# Patient Record
Sex: FEMALE | Race: WHITE | Employment: FULL TIME | URBAN - METROPOLITAN AREA
[De-identification: names, ages, dates, MRNs, and addresses within clinical notes are randomized per-mention and may not be internally consistent; named-entity substitution may affect disease eponyms.]

---

## 2017-07-06 ENCOUNTER — OP HISTORICAL/CONVERTED ENCOUNTER (OUTPATIENT)
Dept: OTHER | Age: 22
End: 2017-07-06

## 2017-07-07 ENCOUNTER — OP HISTORICAL/CONVERTED ENCOUNTER (OUTPATIENT)
Dept: OTHER | Age: 22
End: 2017-07-07

## 2018-02-15 ENCOUNTER — OFFICE VISIT (OUTPATIENT)
Dept: OBGYN CLINIC | Age: 23
End: 2018-02-15

## 2018-02-15 ENCOUNTER — OFFICE VISIT (OUTPATIENT)
Dept: SURGERY | Age: 23
End: 2018-02-15

## 2018-02-15 VITALS
HEIGHT: 64 IN | DIASTOLIC BLOOD PRESSURE: 75 MMHG | WEIGHT: 127 LBS | SYSTOLIC BLOOD PRESSURE: 113 MMHG | BODY MASS INDEX: 21.68 KG/M2 | HEART RATE: 83 BPM

## 2018-02-15 VITALS — BODY MASS INDEX: 22.35 KG/M2 | DIASTOLIC BLOOD PRESSURE: 70 MMHG | WEIGHT: 128.2 LBS | SYSTOLIC BLOOD PRESSURE: 100 MMHG

## 2018-02-15 DIAGNOSIS — N63.10 BREAST MASS, RIGHT: ICD-10-CM

## 2018-02-15 DIAGNOSIS — N64.4 BREAST PAIN, RIGHT: Primary | ICD-10-CM

## 2018-02-15 DIAGNOSIS — N64.4 MASTODYNIA OF RIGHT BREAST: Primary | ICD-10-CM

## 2018-02-15 NOTE — MR AVS SNAPSHOT
[address] [facility] 
 
 
 [facility] 
[phone] Patient: Monica Renner MRN: [id number] :1995 Visit Information Date & Time Provider Department Dept. Phone Encounter #  
 2/15/2018  1:00 PM Catherine Sainz MD [facility] [phone] [id number] Upcoming Health Maintenance Date Due Hepatitis A Peds Age 1-18 (1 of 2 - Standard Series) 1998 DTaP/Tdap/Td series (1 - Tdap) 2004 HPV AGE 9Y-26Y (1 of 3 - Female 3 Dose Series) 2008 Influenza Age 5 to Adult 2017 Allergies as of 2/15/2018  Review Complete On: 2/15/2018 By: Catherine Sainz MD  
  
 Severity Noted Reaction Type Reactions Latex  02/15/2018    Hives Iodine  2016    Hives, Nausea and Vomiting Pcn [Penicillins]  2016    Hives Current Immunizations  Never Reviewed No immunizations on file. Not reviewed this visit Vitals BP Pulse Height(growth percentile) Weight(growth percentile) LMP BMI  
 113/75 83 5' 3.5\" (1.613 m) 127 lb (57.6 kg) 2018 (Exact Date) 22.14 kg/m2 OB Status Smoking Status Having regular periods Never Smoker BMI and BSA Data Body Mass Index Body Surface Area  
 22.14 kg/m 2 1.61 m 2 Preferred Pharmacy Pharmacy Name Phone [id number] [phone] Your Updated Medication List  
  
   
This list is accurate as of: 2/15/18  4:05 PM.  Always use your most recent med list.  
  
  
  
  
 multivitamins Chew Take  by mouth. Patient Instructions Breast Pain: Care Instructions Your Care Instructions Breast tenderness and pain may come and go with your monthly periods (cyclic), or it may not follow any pattern (noncyclic).  Breast pain is rarely caused by a serious health problem. You may need tests to find the cause. Follow-up care is a key part of your treatment and safety. Be sure to make and go to all appointments, and call your doctor if you are having problems. It's also a good idea to know your test results and keep a list of the medicines you take. How can you care for yourself at home? · If your doctor gave you medicine, take it exactly as prescribed. Call your doctor if you think you are having a problem with your medicine. · Take an over-the-counter pain medicine, such as acetaminophen (Tylenol), ibuprofen (Advil, Motrin), or naproxen (Aleve), to relieve pain and swelling. Read and follow all instructions on the label. · Do not take two or more pain medicines at the same time unless the doctor told you to. Many pain medicines have acetaminophen, which is Tylenol. Too much acetaminophen (Tylenol) can be harmful. · Wear a supportive bra, such as a sports bra or a jog bra. · Cut down on the amount of fat in your diet. If you need help planning healthy meals, see a dietitian. · Get at least 30 minutes of exercise on most days of the week. Walking is a good choice. You also may want to do other activities, such as running, swimming, cycling, or playing tennis or team sports. · Keep a healthy sleep pattern. Go to bed at the same time every night, and get up at the same time every day. When should you call for help? Call your doctor now or seek immediate medical care if: 
? · You have new changes in a breast, such as: ¨ A lump or thickening in your breast or armpit. ¨ A change in the breast's size or shape. ¨ Skin changes, such as dimples or puckers. ¨ Nipple discharge. ¨ A change in the color or feel of the skin of your breast or the darker area around the nipple (areola). ¨ A change in the shape of the nipple (it may look like it's being pulled into the breast). ? · You have symptoms of a breast infection, such as: ¨ Increased pain, swelling, redness, or warmth around a breast. 
¨ Red streaks extending from the breast. 
¨ Pus draining from a breast. 
¨ A fever. ? Watch closely for changes in your health, and be sure to contact your doctor if: 
? · Your breast pain does not get better after 1 week. ? · You have a lump or thickening in your breast or armpit. Where can you learn more? Go to http://debora-leslie.info/. Enter L772 in the search box to learn more about \"Breast Pain: Care Instructions. \" Current as of: March 20, 2017 Content Version: 11.4 © 5660-6215 [employer]. Care instructions adapted under license by Gather App (which disclaims liability or warranty for this information). If you have questions about a medical condition or this instruction, always ask your healthcare professional. [address] any warranty or liability for your use of this information. Introducing [address]! Dear Susu Palomino: 
Thank you for requesting a Data Marketplace account. Our records indicate that you already have an active Data Marketplace account. You can access your account anytime at [email or web address] Did you know that you can access your hospital and ER discharge instructions at any time in Data Marketplace? You can also review all of your test results from your hospital stay or ER visit. Additional Information If you have questions, please visit the Frequently Asked Questions section of the Data Marketplace website at [email or web address] Remember, Data Marketplace is NOT to be used for urgent needs. For medical emergencies, dial 911. Now available from your iPhone and Android! Please provide this summary of care documentation to your next provider. Your primary care clinician is listed as Daniella Castrejon. If you have any questions after today's visit, please call [phone].

## 2018-02-15 NOTE — PATIENT INSTRUCTIONS
Breast Pain: Care Instructions  Your Care Instructions    Breast tenderness and pain may come and go with your monthly periods (cyclic), or it may not follow any pattern (noncyclic). Breast pain is rarely caused by a serious health problem. You may need tests to find the cause. Follow-up care is a key part of your treatment and safety. Be sure to make and go to all appointments, and call your doctor if you are having problems. It's also a good idea to know your test results and keep a list of the medicines you take. How can you care for yourself at home? · If your doctor gave you medicine, take it exactly as prescribed. Call your doctor if you think you are having a problem with your medicine. · Take an over-the-counter pain medicine, such as acetaminophen (Tylenol), ibuprofen (Advil, Motrin), or naproxen (Aleve), to relieve pain and swelling. Read and follow all instructions on the label. · Do not take two or more pain medicines at the same time unless the doctor told you to. Many pain medicines have acetaminophen, which is Tylenol. Too much acetaminophen (Tylenol) can be harmful. · Wear a supportive bra, such as a sports bra or a jog bra. · Cut down on the amount of fat in your diet. If you need help planning healthy meals, see a dietitian. · Get at least 30 minutes of exercise on most days of the week. Walking is a good choice. You also may want to do other activities, such as running, swimming, cycling, or playing tennis or team sports. · Keep a healthy sleep pattern. Go to bed at the same time every night, and get up at the same time every day. When should you call for help? Call your doctor now or seek immediate medical care if:  ? · You have new changes in a breast, such as:  ¨ A lump or thickening in your breast or armpit. ¨ A change in the breast's size or shape. ¨ Skin changes, such as dimples or puckers. ¨ Nipple discharge.   ¨ A change in the color or feel of the skin of your breast or the darker area around the nipple (areola). ¨ A change in the shape of the nipple (it may look like it's being pulled into the breast). ? · You have symptoms of a breast infection, such as:  ¨ Increased pain, swelling, redness, or warmth around a breast.  ¨ Red streaks extending from the breast.  ¨ Pus draining from a breast.  ¨ A fever. ? Watch closely for changes in your health, and be sure to contact your doctor if:  ? · Your breast pain does not get better after 1 week. ? · You have a lump or thickening in your breast or armpit. Where can you learn more? Go to http://debora-leslie.info/. Enter J752 in the search box to learn more about \"Breast Pain: Care Instructions. \"  Current as of: March 20, 2017  Content Version: 11.4  © 7118-5722 [employer]. Care instructions adapted under license by monEchelle (which disclaims liability or warranty for this information). If you have questions about a medical condition or this instruction, always ask your healthcare professional. [address] any warranty or liability for your use of this information.

## 2018-02-15 NOTE — MR AVS SNAPSHOT
[address] [facility] [address] [facility] 
[phone] Patient: Dunia Bettencourt MRN: [id number] :1995 Visit Information Date & Time Provider Department Dept. Phone Encounter #  
 2/15/2018 11:00 AM Jazmin Allen MD [facility] [phone] [id number] Upcoming Health Maintenance Date Due  
 HPV AGE 9Y-34Y (1 of 3 - Female 3 Dose Series) 2008 Influenza Age 5 to Adult 2017 Allergies as of 2/15/2018  Review Complete On: 2016 By: Yohannes Booth MD  
  
 Severity Noted Reaction Type Reactions Latex  02/15/2018    Hives Iodine  2016    Hives, Nausea and Vomiting Pcn [Penicillins]  2016    Hives Current Immunizations  Never Reviewed No immunizations on file. Not reviewed this visit Vitals BP Weight(growth percentile) LMP BMI OB Status Smoking Status 100/70 128 lb 3.2 oz (58.2 kg) 2018 (Exact Date) 22.35 kg/m2 Having regular periods Never Smoker BMI and BSA Data Body Mass Index Body Surface Area  
 22.35 kg/m 2 1.61 m 2 Preferred Pharmacy Pharmacy Name Phone [id number], [address] [place] [address] [phone] Your Updated Medication List  
  
   
This list is accurate as of: 2/15/18 11:22 AM.  Always use your most recent med list.  
  
  
  
  
 multivitamins Chew Take  by mouth. Patient Instructions Breast Self-Exam: Care Instructions Your Care Instructions A breast self-exam is when you check your breasts for lumps or changes. This regular exam helps you learn how your breasts normally look and feel. Most breast problems or changes are not because of cancer. Breast self-exam is not a substitute for a mammogram. Having regular breast exams by your doctor and regular mammograms improve your chances of finding any problems with your breasts. Some women set a time each month to do a step-by-step breast self-exam. Other women like a less formal system. They might look at their breasts as they brush their teeth, or feel their breasts once in a while in the shower. If you notice a change in your breast, tell your doctor. Follow-up care is a key part of your treatment and safety. Be sure to make and go to all appointments, and call your doctor if you are having problems. It's also a good idea to know your test results and keep a list of the medicines you take. How do you do a breast self-exam? 
· The best time to examine your breasts is usually one week after your menstrual period begins. Your breasts should not be tender then. If you do not have periods, you might do your exam on a day of the month that is easy to remember. · To examine your breasts: ¨ Remove all your clothes above the waist and lie down. When you are lying down, your breast tissue spreads evenly over your chest wall, which makes it easier to feel all your breast tissue. ¨ Use the pads-not the fingertips-of the 3 middle fingers of your left hand to check your right breast. Move your fingers slowly in small coin-sized circles that overlap. ¨ Use three levels of pressure to feel of all your breast tissue. Use light pressure to feel the tissue close to the skin surface. Use medium pressure to feel a little deeper. Use firm pressure to feel your tissue close to your breastbone and ribs. Use each pressure level to feel your breast tissue before moving on to the next spot. ¨ Check your entire breast, moving up and down as if following a strip from the collarbone to the bra line, and from the armpit to the ribs. Repeat until you have covered the entire breast. 
¨ Repeat this procedure for your left breast, using the pads of the 3 middle fingers of your right hand. · To examine your breasts while in the shower: 
¨ Place one arm over your head and lightly soap your breast on that side. ¨ Using the pads of your fingers, gently move your hand over your breast (in the strip pattern described above), feeling carefully for any lumps or changes. ¨ Repeat for the other breast. 
· Have your doctor inspect anything you notice to see if you need further testing. Where can you learn more? Go to http://debora-leslie.info/. Enter P148 in the search box to learn more about \"Breast Self-Exam: Care Instructions. \" Current as of: May 12, 2017 Content Version: 11.4 © 2507-6728 [employer]. Care instructions adapted under license by Seemage (which disclaims liability or warranty for this information). If you have questions about a medical condition or this instruction, always ask your healthcare professional. [address] any warranty or liability for your use of this information. Introducing [facility]! Dear Diane Obrien: 
Thank you for requesting a LOC&ALL account. Our records indicate that you already have an active LOC&ALL account. You can access your account anytime at [email or web address] Did you know that you can access your hospital and ER discharge instructions at any time in LOC&ALL? You can also review all of your test results from your hospital stay or ER visit. Additional Information If you have questions, please visit the Frequently Asked Questions section of the LOC&ALL website at [email or web address] Remember, LOC&ALL is NOT to be used for urgent needs. For medical emergencies, dial 911. Now available from your iPhone and Android! Please provide this summary of care documentation to your next provider. Your primary care clinician is listed as NONE. If you have any questions after today's visit, please call [phone].

## 2018-02-15 NOTE — PROGRESS NOTES
[address] [facility]  [email or web address]    Ceasar Lawson MD, FACOG       OB/GYN Problem visit    Chief Complaint:   Chief Complaint   Patient presents with    Breast Problem       History of Present Illness: This is a new problem being evaluated by this provider. The patient is a 23 y.o. No obstetric history on file. female who reports having breast pain for 3 weeks. Pain started between menstrual cycles. Denies discomfort related to activity, denies leaking fluid, lumps, bumps. Pain extends from right axilla to bottom middle of breast.   She reports the symptoms are has worsened slightly. Aggravating factors include bending over. Alleviating factors include none. Right sided pain only. No trauma, no increase in chest exercises. Never had before. Little caffeine on a daily based. Works at [employer]. She reports no prior history of breast cancer, biopsy or abnormal mammograms. She is not breast feeding. She does not have other concerns. Last Mammogram Results:  No results found for this or any previous visit. LMP: Patient's last menstrual period was 02/12/2018 (exact date). PFSH:  Past Medical History:   Diagnosis Date    Premature birth     at 35 weeks    Scoliosis      History reviewed. No pertinent surgical history.   Family History   Problem Relation Age of Onset    No Known Problems Mother     No Known Problems Father     Thyroid Disease Sister      hypothyroidism    Lupus Sister     Other Sister      common variable immune deficiency    Neuropathy Sister     Other Maternal Grandfather      CFH    Breast Cancer Paternal Grandmother     Breast Cancer Other      Social History   Substance Use Topics    Smoking status: Never Smoker    Smokeless tobacco: Never Used    Alcohol use No     Allergies   Allergen Reactions    Latex Hives    Iodine Hives and Nausea and Vomiting    Pcn [Penicillins] Hives     Current Outpatient Prescriptions Medication Sig    multivitamins chew Take  by mouth. No current facility-administered medications for this visit. Review of Systems:  History obtained from the patient  Constitutional: negative for fevers, chills and weight loss  ENT ROS: negative for - hearing change, oral lesions or visual changes  Respiratory: negative for cough, wheezing or dyspnea on exertion  Cardiovascular: negative for chest pain, irregular heart beats, exertional chest pressure/discomfort  Gastrointestinal: negative for dysphagia, nausea and vomiting  Genito-Urinary ROS: no dysuria, trouble voiding, or hematuria  Inteument/breast: see HPI  Musculoskeletal:negative for stiff joints, neck pain and muscle weakness  Endocrine ROS: negative for - breast changes, galactorrhea or temperature intolerance  Hematological and Lymphatic ROS: negative for - blood clots, bruising or swollen lymph nodes    Physical Exam:  Visit Vitals    /70    Wt 128 lb 3.2 oz (58.2 kg)    BMI 22.35 kg/m2       GENERAL: alert, well appearing, and in no distress  HEAD: normocephalic, atraumatic. NECK:  supple, no significant adenopathy, no palpable masses  PULM: clear to auscultation, no wheezes, rales or rhonchi, symmetric air entry   COR: normal rate and regular rhythm, S1 and S2 normal   BACK: no cvat  ABDOMEN: soft, nontender, nondistended, no masses or organomegaly   BREAST:   Right breast appear normal, no suspicious masses: suspect cystic changes see image, no skin or nipple changes or axillary nodes. +tender right lateral breast.   Left breast appear normal, no suspicious masses, no skin or nipple changes or axillary nodes  NEURO: alert, oriented, normal speech  SKIN: no breast skin changes        Assessment:  Encounter Diagnoses   Name Primary?  Breast pain, right Yes    Breast mass, right        Plan:  The patient is advised that she should contact the office if she does not note improvement or if symptoms recur.   She should contact our office with any questions or concerns. She could keep her routine annual exam appointment. We reviewed self breast exams with the patient. We recommend follow up with PCP for non-gynecologic complaints and chronic medical problems.       Disc sbe  Disc options for breast pain/syptoms  Disc safer nsaid dosing, vitamin E, decrease caffeine  Disc likely benign but since new sx will proceed with additional evaluation/imaging  Breast referral.   rec pcp fu for pain meds for migraine HA      Orders Placed This Encounter    REFERRAL TO BREAST SURGERY

## 2018-02-15 NOTE — PATIENT INSTRUCTIONS
Breast Self-Exam: Care Instructions  Your Care Instructions    A breast self-exam is when you check your breasts for lumps or changes. This regular exam helps you learn how your breasts normally look and feel. Most breast problems or changes are not because of cancer. Breast self-exam is not a substitute for a mammogram. Having regular breast exams by your doctor and regular mammograms improve your chances of finding any problems with your breasts. Some women set a time each month to do a step-by-step breast self-exam. Other women like a less formal system. They might look at their breasts as they brush their teeth, or feel their breasts once in a while in the shower. If you notice a change in your breast, tell your doctor. Follow-up care is a key part of your treatment and safety. Be sure to make and go to all appointments, and call your doctor if you are having problems. It's also a good idea to know your test results and keep a list of the medicines you take. How do you do a breast self-exam?  · The best time to examine your breasts is usually one week after your menstrual period begins. Your breasts should not be tender then. If you do not have periods, you might do your exam on a day of the month that is easy to remember. · To examine your breasts:  ¨ Remove all your clothes above the waist and lie down. When you are lying down, your breast tissue spreads evenly over your chest wall, which makes it easier to feel all your breast tissue. ¨ Use the pads-not the fingertips-of the 3 middle fingers of your left hand to check your right breast. Move your fingers slowly in small coin-sized circles that overlap. ¨ Use three levels of pressure to feel of all your breast tissue. Use light pressure to feel the tissue close to the skin surface. Use medium pressure to feel a little deeper. Use firm pressure to feel your tissue close to your breastbone and ribs.  Use each pressure level to feel your breast tissue before moving on to the next spot. ¨ Check your entire breast, moving up and down as if following a strip from the collarbone to the bra line, and from the armpit to the ribs. Repeat until you have covered the entire breast.  ¨ Repeat this procedure for your left breast, using the pads of the 3 middle fingers of your right hand. · To examine your breasts while in the shower:  ¨ Place one arm over your head and lightly soap your breast on that side. ¨ Using the pads of your fingers, gently move your hand over your breast (in the strip pattern described above), feeling carefully for any lumps or changes. ¨ Repeat for the other breast.  · Have your doctor inspect anything you notice to see if you need further testing. Where can you learn more? Go to http://debora-leslie.info/. Enter P148 in the search box to learn more about \"Breast Self-Exam: Care Instructions. \"  Current as of: May 12, 2017  Content Version: 11.4  © 9810-6637 [employer]. Care instructions adapted under license by Unsocial (which disclaims liability or warranty for this information). If you have questions about a medical condition or this instruction, always ask your healthcare professional. [place] [address] any warranty or liability for your use of this information.

## 2018-02-15 NOTE — LETTER
NOTIFICATION RETURN TO WORK  
 
2/15/2018 1:48 PM 
 
Ms. Charli Lovell [address] [facility] [address] To Whom It May Concern: 
 
Charli Lovell is currently under the care of [address]. She will return to work after today's appointment with Dr. Lucian Tolbert, Thursday, 2/15/18. If there are questions or concerns please have the patient contact our office.  
 
 
 
Sincerely, 
 
 
Awa Barnett MD

## 2018-02-15 NOTE — PROGRESS NOTES
HISTORY OF PRESENT ILLNESS  Grupo Carter is a 23 y.o. female. HPI  NEW patient consult referred by Dr. Jorge A Alcazar for RIGHT breast pain and lump. RIGHT breast pain has been present for one month. Over the weekend, the pain was severe and she treated it with ibuprofen and tylenol. She thought she could feel a lump but wasn't  sure. Dr. Jorge A Alcazar today felt a couple of lumps. Denies skin changes or nipple discharge/retraction. Obstetric History       T0      L0     SAB0   TAB0   Ectopic0   Molar0   Multiple0   Live Births0    Obstetric Comments   Menarche:  17. LMP: 20. # of Children:  0. Age at Delivery of First Child:  n/a. Hysterectomy/oophorectomy:  NO/NO. Breast Bx:  no.  Hx of Breast Feeding:  n/a. BCP:  no. Hormone therapy:  no.      FH:  Maternal great aunt is a survivor of breast cancer, diagnosed in her late 52's. Paternal grandmother  of breast cancer at 48, diagnosed at 37. No imaging    Review of Systems   Constitutional: Negative. HENT: Negative. Eyes: Negative. Respiratory: Negative. Cardiovascular: Negative. Gastrointestinal: Negative. Genitourinary: Negative. Musculoskeletal: Positive for joint pain and myalgias. Skin: Negative. Neurological: Negative. Endo/Heme/Allergies: Negative. Psychiatric/Behavioral: Negative. Physical Exam   Pulmonary/Chest: Right breast exhibits mass (dense ridge 12:00 2/3.) and tenderness (pt reports pain with palpation lateral and lower breast). Right breast exhibits no nipple discharge and no skin change. Left breast exhibits no mass, no nipple discharge, no skin change and no tenderness. Breasts are symmetrical.       Lymphadenopathy:     She has no cervical adenopathy. She has no axillary adenopathy. Right: No supraclavicular adenopathy present. Left: No supraclavicular adenopathy present. BREAST ULTRASOUND  Indication: Right  breast pain and mass  Technique:   The area was scanned using a high-frequency linear-array near-field transducer  Findings: No abnormal mass, lesion, or shadowing noted. No cysts  Ridge of dense breast tissue corresponds with palpable mass 12:00   Impression: Normal breast tissue   Disposition: No worrisome finding on ultrasound  ASSESSMENT and PLAN    ICD-10-CM ICD-9-CM    1. Mastodynia of right breast N64.4 611.71      RIGHT breast pain. Normal physical exam and ultrasound  Symptoms c/w costochondritis. Will resolve with time. May use heat and ibuprofen if needed.

## 2018-02-16 NOTE — COMMUNICATION BODY
HISTORY OF PRESENT ILLNESS  Demetris Alanis is a 23 y.o. female. HPI  NEW patient consult referred by Dr. Cesilia Armas for RIGHT breast pain and lump. RIGHT breast pain has been present for one month. Over the weekend, the pain was severe and she treated it with ibuprofen and tylenol. She thought she could feel a lump but wasn't  sure. Dr. Cesilia Armas today felt a couple of lumps. Denies skin changes or nipple discharge/retraction. Obstetric History       T0      L0     SAB0   TAB0   Ectopic0   Molar0   Multiple0   Live Births0    Obstetric Comments   Menarche:  17. LMP: 20. # of Children:  0. Age at Delivery of First Child:  n/a. Hysterectomy/oophorectomy:  NO/NO. Breast Bx:  no.  Hx of Breast Feeding:  n/a. BCP:  no. Hormone therapy:  no.      FH:  Maternal great aunt is a survivor of breast cancer, diagnosed in her late 52's. Paternal grandmother  of breast cancer at 48, diagnosed at 37. No imaging    Review of Systems   Constitutional: Negative. HENT: Negative. Eyes: Negative. Respiratory: Negative. Cardiovascular: Negative. Gastrointestinal: Negative. Genitourinary: Negative. Musculoskeletal: Positive for joint pain and myalgias. Skin: Negative. Neurological: Negative. Endo/Heme/Allergies: Negative. Psychiatric/Behavioral: Negative. Physical Exam   Pulmonary/Chest: Right breast exhibits mass (dense ridge 12:00 2/3.) and tenderness (pt reports pain with palpation lateral and lower breast). Right breast exhibits no nipple discharge and no skin change. Left breast exhibits no mass, no nipple discharge, no skin change and no tenderness. Breasts are symmetrical.       Lymphadenopathy:     She has no cervical adenopathy. She has no axillary adenopathy. Right: No supraclavicular adenopathy present. Left: No supraclavicular adenopathy present. BREAST ULTRASOUND  Indication: Right  breast pain and mass  Technique:   The area was scanned using a high-frequency linear-array near-field transducer  Findings: No abnormal mass, lesion, or shadowing noted. No cysts  Ridge of dense breast tissue corresponds with palpable mass 12:00   Impression: Normal breast tissue   Disposition: No worrisome finding on ultrasound  ASSESSMENT and PLAN    ICD-10-CM ICD-9-CM    1. Mastodynia of right breast N64.4 611.71      RIGHT breast pain. Normal physical exam and ultrasound  Symptoms c/w costochondritis. Will resolve with time. May use heat and ibuprofen if needed.

## 2018-10-29 ENCOUNTER — OFFICE VISIT (OUTPATIENT)
Dept: NEUROLOGY | Age: 23
End: 2018-10-29

## 2018-10-29 VITALS
WEIGHT: 140 LBS | OXYGEN SATURATION: 98 % | HEIGHT: 64 IN | BODY MASS INDEX: 23.9 KG/M2 | HEART RATE: 91 BPM | DIASTOLIC BLOOD PRESSURE: 68 MMHG | SYSTOLIC BLOOD PRESSURE: 108 MMHG

## 2018-10-29 DIAGNOSIS — G43.009 MIGRAINE WITHOUT AURA AND WITHOUT STATUS MIGRAINOSUS, NOT INTRACTABLE: Primary | ICD-10-CM

## 2018-10-29 NOTE — PROGRESS NOTES
Neurology Consult      Subjective:      Ricardo Bobby is a 26 y.o. female said 2 years ago she had incessant low back issues with pain and such and the investigation suggested that she had some type of vulnerable tailbone issue? End up saying the back would always hurt and in the midst of this experienced what she described as a left knee dislocation in 2017 and then it would occur again. Finally in June of this summer he had arthroscopic surgery with orthopedics and was told she had a nerve in the knee that was surrounded by tissue and had to be freed? Went to physical therapy and they noticed she could move the toes in her left foot. Went back to orthopedics and they did an MRI of the low back that was unrevealing and an EMG and nerve conduction of both legs that was unrevealing. 3 weeks later would have a similar set of problems with the right leg/foot? Currently says her right leg is better and sometimes the left leg \"falls asleep from the knee down and it gets back to normal if she stands up\". However with her job which entails a lot of standing if she reaches up to 4 hours worth of this activity the symptoms seem to return and then improves if she can get off her feet? Has occasions of shooting pains down the right and left lateral legs over the weekend that went away by mid day yesterday? Says she does not cross her legs are otherwise encompass or crowd the fibular heads. No background diabetes. Has a 42-year-old sister with blood dyscrasias that is followed by [facility] and outside out of [facility] medical clinic's and I am not sure what her official diagnosis is? As I know the patient's history she has no such medical accessories. Current Outpatient Medications   Medication Sig Dispense Refill    multivitamins chew Take  by mouth.         Allergies   Allergen Reactions    Latex Hives    Iodine Hives and Nausea and Vomiting    Pcn [Penicillins] Hives    Sulfa (Sulfonamide Antibiotics) Not Reported This Time     Past Medical History:   Diagnosis Date    Premature birth     at 35 weeks    Scoliosis       History reviewed. No pertinent surgical history. Social History     Socioeconomic History    Marital status: SINGLE     Spouse name: Not on file    Number of children: Not on file    Years of education: Not on file    Highest education level: Not on file   Social Needs    Financial resource strain: Not on file    Food insecurity - worry: Not on file    Food insecurity - inability: Not on file    Transportation needs - medical: Not on file   mAPPn needs - non-medical: Not on file   Occupational History     Comment: works at a [employer]   Tobacco Use    Smoking status: Never Smoker    Smokeless tobacco: Never Used   Substance and Sexual Activity    Alcohol use: No    Drug use: No    Sexual activity: No   Other Topics Concern    Not on file   Social History Narrative    Not on file      Family History   Problem Relation Age of Onset    No Known Problems Mother     No Known Problems Father     Thyroid Disease Sister         hypothyroidism    Lupus Sister     Other Sister         common variable immune deficiency    Neuropathy Sister     Other Maternal Grandfather         CFH    Breast Cancer Paternal Grandmother     Breast Cancer Other       Visit Vitals  /68   Pulse 91   Ht 5' 3.5\" (1.613 m)   Wt 63.5 kg (140 lb)   SpO2 98%   BMI 24.41 kg/m²        Review of Systems:   A comprehensive review of systems was negative except for that written in the HPI. Neuro Exam:     Appearance: The patient is well developed, well nourished, provides a coherent history and is in no acute distress. Mental Status: Oriented to time, place and person. Mood and affect appropriate. Cranial Nerves:   Intact visual fields. Fundi are benign. SAURAV, EOM's full, no nystagmus, no ptosis. Facial sensation is normal. Corneal reflexes are intact. Facial movement is symmetric.  Hearing is normal bilaterally. Palate is midline with normal sternocleidomastoid and trapezius muscles are normal. Tongue is midline. Motor:  5/5 strength in upper and lower proximal and distal muscles but has variable performance on toe dorsi flexion and extension both feet. Normal bulk and tone. No fasciculations. Reflexes:   Deep tendon reflexes 2+/4 and symmetrical.   Sensory:   Normal to touch, pinprick and vibration with an inconsistent report to pinprick over the left dorsal lateral foot and distal leg. Gait:  Normal gait. Including on toes on heels independently extending and Romberg. Tremor:   No tremor noted. Cerebellar:  No cerebellar signs present. Neurovascular:  Normal heart sounds and regular rhythm, peripheral pulses intact, and no carotid bruits. Toes downgoing no clonus no Vazquez's. Straight leg raising -90 degrees. Percussion of the spinous and paraspinous processes negative. Says she is tender over the left fibular head and negative on the right. Tinel's negative over the tarsal tunnels. Assessment:   Transient neurologic symptoms lower extremities. Cannot reconcile the chronological history complaint list and the exam findings at this time. Would like to get the operative report on the left knee surgery done earlier this summer and share patient with my colleague with neuromuscular expertise Dr. Shivani Aguila. Plan:   Revisit pended at this time.   Signed by :  Priyank Funes MD

## 2018-10-29 NOTE — PATIENT INSTRUCTIONS
Patient history reviewed and patient examined. Had a hard time connecting the dots on patient history and chief complaint. Cannot find any solid exam evidence to support a diagnosis on the first visit. Would like to share the case with Dr. Blanca Bustos our neuromuscular expert. Would like to get the operative report from the orthopedic surgeon as to what was done on that occasion.

## 2018-11-30 ENCOUNTER — OFFICE VISIT (OUTPATIENT)
Dept: NEUROLOGY | Age: 23
End: 2018-11-30

## 2018-11-30 VITALS
RESPIRATION RATE: 20 BRPM | SYSTOLIC BLOOD PRESSURE: 116 MMHG | HEART RATE: 97 BPM | HEIGHT: 64 IN | WEIGHT: 140 LBS | BODY MASS INDEX: 23.9 KG/M2 | DIASTOLIC BLOOD PRESSURE: 74 MMHG | OXYGEN SATURATION: 98 %

## 2018-11-30 DIAGNOSIS — R29.898 LEFT LEG WEAKNESS: Primary | ICD-10-CM

## 2018-11-30 RX ORDER — IBUPROFEN 200 MG
400 TABLET ORAL
COMMUNITY
End: 2021-11-18

## 2018-11-30 NOTE — PROGRESS NOTES
Pain in her left leg- started when she was finishing up physical therapy after her surgery in June     She has a hard time moving her toes, she followed back up with her surgeon and they did do an EMG it was normal     It just comes whenever very random, excruciating pain in the left leg, no specific area that she can pin point     The ibuprofen does help some of the time  She has noticed that right above her knee she will get swelling

## 2018-11-30 NOTE — LETTER
11/30/2018 2:02 PM 
 
Patient:  Rommel Perez YOB: 1995 Date of Visit: 11/30/2018 Dear Dinesh Medellin MD 
[address] [address] [address] [facility] [address] VIA Facsimile: [phone] 
 : 
 
 
Thank you for referring Ms. Rommel Perez to me for evaluation/treatment. Below are the relevant portions of my assessment and plan of care. If you have questions, please do not hesitate to call me. I look forward to following Ms. Jennifer Carty along with you. Sincerely, Taryn Moreno MD

## 2018-11-30 NOTE — PATIENT INSTRUCTIONS
A Healthy Lifestyle: Care Instructions  Your Care Instructions    A healthy lifestyle can help you feel good, stay at a healthy weight, and have plenty of energy for both work and play. A healthy lifestyle is something you can share with your whole family. A healthy lifestyle also can lower your risk for serious health problems, such as high blood pressure, heart disease, and diabetes. You can follow a few steps listed below to improve your health and the health of your family. Follow-up care is a key part of your treatment and safety. Be sure to make and go to all appointments, and call your doctor if you are having problems. It's also a good idea to know your test results and keep a list of the medicines you take. How can you care for yourself at home? · Do not eat too much sugar, fat, or fast foods. You can still have dessert and treats now and then. The goal is moderation. · Start small to improve your eating habits. Pay attention to portion sizes, drink less juice and soda pop, and eat more fruits and vegetables. ? Eat a healthy amount of food. A 3-ounce serving of meat, for example, is about the size of a deck of cards. Fill the rest of your plate with vegetables and whole grains. ? Limit the amount of soda and sports drinks you have every day. Drink more water when you are thirsty. ? Eat at least 5 servings of fruits and vegetables every day. It may seem like a lot, but it is not hard to reach this goal. A serving or helping is 1 piece of fruit, 1 cup of vegetables, or 2 cups of leafy, raw vegetables. Have an apple or some carrot sticks as an afternoon snack instead of a candy bar. Try to have fruits and/or vegetables at every meal.  · Make exercise part of your daily routine. You may want to start with simple activities, such as walking, bicycling, or slow swimming. Try to be active 30 to 60 minutes every day. You do not need to do all 30 to 60 minutes all at once.  For example, you can exercise 3 times a day for 10 or 20 minutes. Moderate exercise is safe for most people, but it is always a good idea to talk to your doctor before starting an exercise program.  · Keep moving. Princess Ackerman the lawn, work in the garden, or [employer]. Take the stairs instead of the elevator at work. · If you smoke, quit. People who smoke have an increased risk for heart attack, stroke, cancer, and other lung illnesses. Quitting is hard, but there are ways to boost your chance of quitting tobacco for good. ? Use nicotine gum, patches, or lozenges. ? Ask your doctor about stop-smoking programs and medicines. ? Keep trying. In addition to reducing your risk of diseases in the future, you will notice some benefits soon after you stop using tobacco. If you have shortness of breath or asthma symptoms, they will likely get better within a few weeks after you quit. · Limit how much alcohol you drink. Moderate amounts of alcohol (up to 2 drinks a day for men, 1 drink a day for women) are okay. But drinking too much can lead to liver problems, high blood pressure, and other health problems. Family health  If you have a family, there are many things you can do together to improve your health. · Eat meals together as a family as often as possible. · Eat healthy foods. This includes fruits, vegetables, lean meats and dairy, and whole grains. · Include your family in your fitness plan. Most people think of activities such as jogging or tennis as the way to fitness, but there are many ways you and your family can be more active. Anything that makes you breathe hard and gets your heart pumping is exercise. Here are some tips:  ? Walk to do errands or to take your child to school or the bus.  ? Go for a family bike ride after dinner instead of watching TV. Where can you learn more? Go to http://debora-leslie.info/. Enter D408 in the search box to learn more about \"A Healthy Lifestyle: Care Instructions. \"  Current as of: December 7, 2017  Content Version: 11.8  © 3312-5062 [employer]wise, Inc[employer]. Care instructions adapted under license by [employer] (which disclaims liability or warranty for this information). If you have questions about a medical condition or this instruction, always ask your healthcare professional. [address] any warranty or liability for your use of this information.

## 2018-11-30 NOTE — PROGRESS NOTES
Neurology Progress Note    Patient ID:  Shelbie Dobson  [id number]  44 y.o.  1995      Subjective:   History:  Ms. 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 On 6/15/18, patient had Left knee arthroscopy with resection of medial plica and fat pad c/o Dr Steph Juárez which took care of the L knee pain. Patient started physical therapy who noted weakness of the toes of the L foot with numbness and pain of the L lateral leg  (+) L knee swelling after surgery  (-) knee brace  (+) lower back pain    Sister as autoimmune condition. Recent test done: (In Media)  MRI of the lumbar spine (9/19/18) normal (films not available for review)  EMG/NCS of both LE from outside (Dr Josh Latham) showed normal superficial peroneal and common fibular responses at the EDB. (but did not record at the TA). Also on EMG it showed reduced recruitment of both TA, peron longus, ADM and L Ext hallucis longus and dig longus muscles. No conclusion given    ROS:  Per HPI-  Otherwise the remainder of ROS was negative    Social Hx  Social History     Socioeconomic History    Marital status: SINGLE     Spouse name: Not on file    Number of children: Not on file    Years of education: Not on file    Highest education level: Not on file   Occupational History     Comment: works at a pharmacy   Tobacco Use    Smoking status: Never Smoker    Smokeless tobacco: Never Used   Substance and Sexual Activity    Alcohol use: No    Drug use: No    Sexual activity: No       Meds:  Current Outpatient Medications on File Prior to Visit   Medication Sig Dispense Refill    ibuprofen (MOTRIN) 200 mg tablet Take  by mouth every six (6) hours as needed for Pain.  multivitamins chew Take  by mouth. No current facility-administered medications on file prior to visit.         Imaging:    CT Results (recent):  No results found for this or any previous visit. MRI Results (recent):  No results found for this or any previous visit. IR Results (recent):  No results found for this or any previous visit. VAS/US Results (recent):  No results found for this or any previous visit. Reviewed records in [facility] [employer] and media tab today    Lab Review     No visits with results within 3 Month(s) from this visit. Latest known visit with results is:   No results found for any previous visit. Objective:       Exam:  Visit Vitals  /74   Pulse 97   Resp 20   Ht 5' 3.5\" (1.613 m)   Wt 63.5 kg (140 lb)   SpO2 98%   BMI 24.41 kg/m²     Gen: Awake, alert, follows commands  Appropriate appearance, normal speech. Oriented to all spheres. No visual field defect on confrontation exam.  Full eyes movement, with no nystagmus, no diplopia, no ptosis. Normal gag and swallow. All remaining cranial nerves were normal  Motor function: 5/5 in all extremities  (+) tenderness L fibular area  Sensory: dec PP L lateral leg and L big toe  DTRs ++ UE, 3+ knees, 2+ ankles in all extremities, (-) Babinski  Good FTN and HTS   Gait: L lower extremity with slightly in-toe compared to the R lower extremity    Assessment:       ICD-10-CM ICD-9-CM    1. Left leg weakness R29.898 729.89 KYUNG, DIRECT, W/REFLEX      SED RATE (ESR)      RHEUMATOID FACTOR, QL     Considerations include L common peroneal mononeuropathy at the knee segment (due to L knee swelling s/p knee arthroscopy), rheumatologic condition (younger sister has autoimmune condition) and mechanical in nature s/p L knee arthroscopy    MRI lumbar spine was normal per report    Plan:   1. Discussed doing another EMG/NCS of the L LE to look for interval changes. Patient declined for now  2. Blood test for KYUNG, ESR and RF. Patient to call for results  3. Already getting physical therapy  4.  Consider following up with ortho if above tests are negative    Follow-up Disposition:  Return for review of results.           Ramses Isaac MD  Diplomate, American Board of Psychiatry and Neurology  Diplomate, Neuromuscular Medicine  Diplomate, American Board of Electrodiagnostic Medicine

## 2018-12-01 LAB
ANA SER QL: NEGATIVE
ERYTHROCYTE [SEDIMENTATION RATE] IN BLOOD BY WESTERGREN METHOD: 7 MM/HR (ref 0–32)
RHEUMATOID FACT SERPL-ACNC: <10 IU/ML (ref 0–13.9)

## 2018-12-05 ENCOUNTER — TELEPHONE (OUTPATIENT)
Dept: NEUROLOGY | Age: 23
End: 2018-12-05

## 2018-12-05 NOTE — TELEPHONE ENCOUNTER
Patient was scheduled to see you on 12/28/18, but the office had to cancel that appt. She was wondering if you could give the results of her lab work and if she needs to be seen again.     Please advise

## 2018-12-05 NOTE — TELEPHONE ENCOUNTER
Pt called because she was not sure if a f/u appt was needed to go over her recent labs taken on 11/30/18. She had an appt scheduled but provider will not be in office. She was told that the Dr. Pantera Johnson update her as to whether or not she would need to continue to f/u with him or see a different provider. She would like a call back to discuss.  Best contact [phone]

## 2018-12-06 NOTE — TELEPHONE ENCOUNTER
Instructed patient per Dr. Madison Ramirez note: She stated that she will call back if she plans to schedule an appt. Pls inform her blood test for rheumatologic condition is normal.     P> She can observe symptoms for now unless she wants to repeat the EMG/NCS.

## 2019-09-27 ENCOUNTER — OFFICE VISIT (OUTPATIENT)
Dept: OBGYN CLINIC | Age: 24
End: 2019-09-27

## 2019-09-27 VITALS
BODY MASS INDEX: 22.81 KG/M2 | WEIGHT: 133.6 LBS | DIASTOLIC BLOOD PRESSURE: 62 MMHG | HEIGHT: 64 IN | SYSTOLIC BLOOD PRESSURE: 100 MMHG

## 2019-09-27 DIAGNOSIS — Z01.419 ENCOUNTER FOR GYNECOLOGICAL EXAMINATION (GENERAL) (ROUTINE) WITHOUT ABNORMAL FINDINGS: Primary | ICD-10-CM

## 2019-09-27 DIAGNOSIS — N92.4 EXCESSIVE BLEEDING IN PREMENOPAUSAL PERIOD: ICD-10-CM

## 2019-09-27 DIAGNOSIS — N94.6 DYSMENORRHEA: ICD-10-CM

## 2019-09-27 RX ORDER — ETONOGESTREL AND ETHINYL ESTRADIOL 11.7; 2.7 MG/1; MG/1
1 INSERT, EXTENDED RELEASE VAGINAL
Qty: 3 EACH | Refills: 4 | Status: SHIPPED | OUTPATIENT
Start: 2019-09-27 | End: 2020-07-16 | Stop reason: SDUPTHER

## 2019-09-27 NOTE — PROGRESS NOTES
[facility]  [email or web address]  [phone]    Nicole Barrios MD, FACOG       Annual Gynecologic Exam:  WWE <40  Chief Complaint   Patient presents with    Well Woman   Abdoulaye Porras is a 27 y.o.  WHITE OR  female who presents for an annual well woman exam.  Patient's last menstrual period was 09/10/2019. .    With regard to the Gardisil vaccine, she declines to receive it. She does report additional concerns today. Would like to discuss severe cramping with cycles for the last 3 months  Would also like contraceptive methods, planning wedding  in [facility]. Menstrual status:  Her periods are moderate, regular cycles. She does report dysmenorrhea/painful menses. She does not report irregular bleeding. Sexual history and Contraception:  Social History     Substance and Sexual Activity   Sexual Activity Yes    Partners: Male    Birth control/protection: Condom     She sometimes use condoms with sexual activity  She does not reports new sexual partner(s) in the last year. The patient does not request STD testing. We recommended testing per CDC guidelines and at patient request.     Preventive Medicine History:  Her most recent Pap smear result: has not had a pap smear. She does not have a history of PRESLEY 2, 3 or cervical cancer. Past Medical History:   Diagnosis Date    Broken foot 2019    right foot, no surgery-wore boot/cast for 6 weeks    Premature birth     at 29 weeks    Scoliosis      OB History    Para Term  AB Living   0 0 0 0 0 0   SAB TAB Ectopic Molar Multiple Live Births   0 0 0 0 0 0   Obstetric Comments   Menarche:  17. LMP: 20. # of Children:  0. Age at Delivery of First Child:  n/a. Hysterectomy/oophorectomy:  NO/NO. Breast Bx:  no.  Hx of Breast Feeding:  n/a.   BCP:  no. Hormone therapy:  no.      Past Surgical History:   Procedure Laterality Date    HX KNEE ARTHROSCOPY Right 06/2018     Family History   Problem Relation Age of Onset    No Known Problems Mother     No Known Problems Father     Thyroid Disease Sister         hypothyroidism    Lupus Sister     Other Sister         common variable immune deficiency    Neuropathy Sister     Other Maternal Grandfather         CFH    Breast Cancer Paternal Grandmother     Breast Cancer Other      Social History     Socioeconomic History    Marital status: SINGLE     Spouse name: Not on file    Number of children: Not on file    Years of education: Not on file    Highest education level: Not on file   Occupational History     Comment: works at a [employer] resource strain: Not on file    Food insecurity:     Worry: Not on file     Inability: Not on file    Transportation needs:     Medical: Not on file     Non-medical: Not on file   Tobacco Use    Smoking status: Never Smoker    Smokeless tobacco: Never Used   Substance and Sexual Activity    Alcohol use: No    Drug use: No    Sexual activity: Yes     Partners: Male     Birth control/protection: Condom   Lifestyle    Physical activity:     Days per week: Not on file     Minutes per session: Not on file    Stress: Not on file   Relationships    Social connections:     Talks on phone: Not on file     Gets together: Not on file     Attends Baptism service: Not on file     Active member of club or organization: Not on file     Attends meetings of clubs or organizations: Not on file     Relationship status: Not on file    Intimate partner violence:     Fear of current or ex partner: Not on file     Emotionally abused: Not on file     Physically abused: Not on file     Forced sexual activity: Not on file   Other Topics Concern    Not on file   Social History Narrative    Not on file       Allergies   Allergen Reactions    Latex Hives    Iodine Hives and Nausea and Vomiting    Pcn [Penicillins] Hives    Sulfa (Sulfonamide Antibiotics) Not Reported This Time       Current Outpatient Medications   Medication Sig    fexofenadine HCl (FEXOFENADINE PO) Take  by mouth.  acetaminophen (TYLENOL PO) Take  by mouth.  ethinyl estradiol-etonogestrel (NUVARING) 0.12-0.015 mg/24 hr vaginal ring 1 Each by Intravaginal route every thirty (30) days. Insert 1 vaginal ring by vaginal route once a month. Leave in place for 3 weeks, remove for 1 week for 90 days.  ibuprofen (MOTRIN) 200 mg tablet Take  by mouth every six (6) hours as needed for Pain.  multivitamins chew Take  by mouth. No current facility-administered medications for this visit. There is no problem list on file for this patient.       Review of Systems - History obtained from the patient  Constitutional: negative for weight loss, fever, night sweats  HEENT: negative for hearing loss, earache, congestion, snoring, sorethroat  CV: negative for chest pain, palpitations, edema  Resp: negative for cough, shortness of breath, wheezing  GI: negative for change in bowel habits, abdominal pain, black or bloody stools  : negative for frequency, dysuria, hematuria  GYN: see HPI  MSK: negative for back pain, joint pain, muscle pain  Breast: negative for breast lumps, nipple discharge, galactorrhea  Skin :negative for itching, rash, hives  Neuro: negative for dizziness, headache, confusion, weakness  Psych: negative for anxiety, depression, change in mood  Heme/lymph: negative for bleeding, bruising, pallor      (WWE continued)     Physical Exam  Visit Vitals  /62 (BP 1 Location: Left arm, BP Patient Position: Sitting)   Ht 5' 3.5\" (1.613 m)   Wt 133 lb 9.6 oz (60.6 kg)   LMP 09/10/2019   BMI 23.29 kg/m²       Constitutional  · Appearance: well-nourished, well developed, alert, in no acute distress    HENT  · Head and Face: appears normal    Neck  · Inspection/Palpation: normal appearance, no masses or tenderness  · Lymph Nodes: no lymphadenopathy present  · Thyroid: gland size normal, nontender, no nodules or masses present on palpation    Chest  · Respiratory Effort: breathing unlabored  · Auscultation: normal breath sounds    Cardiovascular  · Heart:  · Auscultation: regular rate and rhythm without murmur    Breasts  · Inspection of Breasts: breasts symmetrical, no skin changes, no discharge present, nipple appearance normal, no skin retraction present  · Palpation of Breasts and Axillae: no masses present on palpation, no breast tenderness  · Axillary Lymph Nodes: no lymphadenopathy present    Gastrointestinal  · Abdominal Examination: abdomen non-tender to palpation, normal bowel sounds, no masses present  · Liver and spleen: no hepatomegaly present, spleen not palpable  · Hernias: no hernias identified    Genitourinary  · External Genitalia: normal appearance for age, no discharge present, no tenderness present, no inflammatory lesions present, no masses present  · Vagina: normal vaginal vault without central or paravaginal defects, no discharge present, no inflammatory lesions present, no masses present  · Bladder: non-tender to palpation  · Urethra: appears normal  · Cervix: normal   · Uterus: normal size, shape and consistency  · Adnexa: no adnexal tenderness present, no adnexal masses present  · Perineum: perineum within normal limits, no evidence of trauma, no rashes or skin lesions present  · Anus: anus within normal limits, no hemorrhoids present  · Inguinal Lymph Nodes: no lymphadenopathy present    Skin  · General Inspection: no rash, no lesions identified    Neurologic/Psychiatric  · Mental Status:  · Orientation: grossly oriented to person, place and time  · Mood and Affect: mood normal, affect appropriate    Assessment:  27 y.o.  for well woman exam  Encounter Diagnoses   Name Primary?     Encounter for gynecological examination (general) (routine) without abnormal findings Yes    Excessive bleeding in premenopausal period     Dysmenorrhea        Plan:  The patient was counseled about diet, exercise, healthy lifestyle  We discussed self breast exam  We discussed safer sex practices, condom use and risk factors for sexually transmitted diseases. We discussed current pap smear and HR HPV testing guidelines. We recommend follow up one year for routine annual gynecologic exam or sooner prn  We recommend routine follow up with her primary care doctor for management of chronic medical problems and non-gynecologic concerns  Handouts were given to the patient  We discussed calcium/vitamin D/weight bearing exercise and osteoporosis prevention  We discussed safer NSAID dosing for heavy cycles. Pt was advised to take this dose with food and not to take for more than 5 days. Disc RBA including bleeding/gastric irritation. CHRISTINA POWELL if NI to discuss other options. Discussed risks, benefits and alternatives of OCP/nuvaring/patch: including but not limited to dvt/pe/mi/cva/ca/gi risks and that smoking, increasing age and other health conditions can increase these risks. We discussed progesterone only and non hormonal options for contraception including but not limited to condoms, IUDs, Nexplanon, and depo provera. Disc continuous use ring for wedding or to skip cycle prn  Notify MD if sx not better x 3 mos on ring. Rec back up with new start, disc how to insert and remove with model. Folllow up:  [x] return for annual well woman exam in one year or sooner if she is having problems  [] follow up and ultrasound  [] 6 months  [] 3 months  [] 6 weeks   [] 1 month    Orders Placed This Encounter    ethinyl estradiol-etonogestrel (NUVARING) 0.12-0.015 mg/24 hr vaginal ring    PAP IG, CT-NG, RFX APTIMA HPV ASCUS ([id number], [id number]))       No results found for any visits on 09/27/19.

## 2019-09-27 NOTE — PATIENT INSTRUCTIONS
Well Visit, Ages 25 to 48: Care Instructions  Your Care Instructions    Physical exams can help you stay healthy. Your doctor has checked your overall health and may have suggested ways to take good care of yourself. He or she also may have recommended tests. At home, you can help prevent illness with healthy eating, regular exercise, and other steps. Follow-up care is a key part of your treatment and safety. Be sure to make and go to all appointments, and call your doctor if you are having problems. It's also a good idea to know your test results and keep a list of the medicines you take. How can you care for yourself at home? · Reach and stay at a healthy weight. This will lower your risk for many problems, such as obesity, diabetes, heart disease, and high blood pressure. · Get at least 30 minutes of physical activity on most days of the week. Walking is a good choice. You also may want to do other activities, such as running, swimming, cycling, or playing tennis or team sports. Discuss any changes in your exercise program with your doctor. · Do not smoke or allow others to smoke around you. If you need help quitting, talk to your doctor about stop-smoking programs and medicines. These can increase your chances of quitting for good. · Talk to your doctor about whether you have any risk factors for sexually transmitted infections (STIs). Having one sex partner (who does not have STIs and does not have sex with anyone else) is a good way to avoid these infections. · Use birth control if you do not want to have children at this time. Talk with your doctor about the choices available and what might be best for you. · Protect your skin from too much sun. When you're outdoors from 10 a.m. to 4 p.m., stay in the shade or cover up with clothing and a hat with a wide brim. Wear sunglasses that block UV rays. Even when it's cloudy, put broad-spectrum sunscreen (SPF 30 or higher) on any exposed skin.   · See a dentist one or two times a year for checkups and to have your teeth cleaned. · Wear a seat belt in the car. Follow your doctor's advice about when to have certain tests. These tests can spot problems early. For everyone  · Cholesterol. Have the fat (cholesterol) in your blood tested after age 21. Your doctor will tell you how often to have this done based on your age, family history, or other things that can increase your risk for heart disease. · Blood pressure. Have your blood pressure checked during a routine doctor visit. Your doctor will tell you how often to check your blood pressure based on your age, your blood pressure results, and other factors. · Vision. Talk with your doctor about how often to have a glaucoma test.  · Diabetes. Ask your doctor whether you should have tests for diabetes. · Colon cancer. Your risk for colorectal cancer gets higher as you get older. Some experts say that adults should start regular screening at age 48 and stop at age 76. Others say to start before age 48 or continue after age 76. Talk with your doctor about your risk and when to start and stop screening. For women  · Breast exam and mammogram. Talk to your doctor about when you should have a clinical breast exam and a mammogram. Medical experts differ on whether and how often women under 50 should have these tests. Your doctor can help you decide what is right for you. · Cervical cancer screening test and pelvic exam. Begin with a Pap test at age 24. The test often is part of a pelvic exam. Starting at age 27, you may choose to have a Pap test, an HPV test, or both tests at the same time (called co-testing). Talk with your doctor about how often to have testing. · Tests for sexually transmitted infections (STIs). Ask whether you should have tests for STIs. You may be at risk if you have sex with more than one person, especially if your partners do not wear condoms.   For men  · Tests for sexually transmitted infections (STIs). Ask whether you should have tests for STIs. You may be at risk if you have sex with more than one person, especially if you do not wear a condom. · Testicular cancer exam. Ask your doctor whether you should check your testicles regularly. · Prostate exam. Talk to your doctor about whether you should have a blood test (called a PSA test) for prostate cancer. Experts differ on whether and when men should have this test. Some experts suggest it if you are older than 39 and are -American or have a father or brother who got prostate cancer when he was younger than 72. When should you call for help? Watch closely for changes in your health, and be sure to contact your doctor if you have any problems or symptoms that concern you. Where can you learn more? Go to http://debora-leslie.info/. Enter P072 in the search box to learn more about \"Well Visit, Ages 25 to 48: Care Instructions. \"  Current as of: December 13, 2018  Content Version: 12.2  © 1549-7582 [employer]. Care instructions adapted under license by [employer] (which disclaims liability or warranty for this information). If you have questions about a medical condition or this instruction, always ask your healthcare professional. [place] [address] any warranty or liability for your use of this information.

## 2019-10-03 LAB
C TRACH RRNA CVX QL NAA+PROBE: NEGATIVE
CYTOLOGIST CVX/VAG CYTO: NORMAL
CYTOLOGY CVX/VAG DOC CYTO: NORMAL
CYTOLOGY CVX/VAG DOC THIN PREP: NORMAL
DX ICD CODE: NORMAL
LABCORP, 190119: NORMAL
Lab: NORMAL
Lab: NORMAL
N GONORRHOEA RRNA CVX QL NAA+PROBE: NEGATIVE
OTHER STN SPEC: NORMAL
STAT OF ADQ CVX/VAG CYTO-IMP: NORMAL

## 2019-10-23 ENCOUNTER — HOSPITAL ENCOUNTER (EMERGENCY)
Age: 24
Discharge: HOME | End: 2019-10-23
Attending: EMERGENCY MEDICINE
Payer: COMMERCIAL

## 2019-10-23 VITALS
SYSTOLIC BLOOD PRESSURE: 114 MMHG | WEIGHT: 130 LBS | BODY MASS INDEX: 23.04 KG/M2 | DIASTOLIC BLOOD PRESSURE: 83 MMHG | OXYGEN SATURATION: 99 % | RESPIRATION RATE: 16 BRPM | TEMPERATURE: 98.6 F | HEART RATE: 109 BPM | HEIGHT: 63 IN

## 2019-10-23 DIAGNOSIS — N39.0 URINARY TRACT INFECTION WITHOUT HEMATURIA, SITE UNSPECIFIED: Primary | ICD-10-CM

## 2019-10-23 LAB
ALBUMIN SERPL-MCNC: 3.8 G/DL (ref 3.5–5)
ALBUMIN/GLOB SERPL: 1 {RATIO} (ref 1.1–2.2)
ALP SERPL-CCNC: 58 U/L (ref 45–117)
ALT SERPL-CCNC: 20 U/L (ref 12–78)
ANION GAP SERPL CALC-SCNC: 8 MMOL/L (ref 5–15)
APPEARANCE UR: ABNORMAL
AST SERPL-CCNC: 17 U/L (ref 15–37)
BACTERIA URNS QL MICRO: ABNORMAL /HPF
BASOPHILS # BLD: 0 K/UL (ref 0–0.1)
BASOPHILS NFR BLD: 1 % (ref 0–1)
BILIRUB SERPL-MCNC: 0.4 MG/DL (ref 0.2–1)
BILIRUB UR QL: NEGATIVE
BUN SERPL-MCNC: 8 MG/DL (ref 6–20)
BUN/CREAT SERPL: 9 (ref 12–20)
CALCIUM SERPL-MCNC: 8.6 MG/DL (ref 8.5–10.1)
CHLORIDE SERPL-SCNC: 109 MMOL/L (ref 97–108)
CO2 SERPL-SCNC: 21 MMOL/L (ref 21–32)
COLOR UR: ABNORMAL
CREAT SERPL-MCNC: 0.91 MG/DL (ref 0.55–1.02)
DIFFERENTIAL METHOD BLD: NORMAL
EOSINOPHIL # BLD: 0.1 K/UL (ref 0–0.4)
EOSINOPHIL NFR BLD: 2 % (ref 0–7)
EPITH CASTS URNS QL MICRO: ABNORMAL /LPF
ERYTHROCYTE [DISTWIDTH] IN BLOOD BY AUTOMATED COUNT: 11.9 % (ref 11.5–14.5)
GLOBULIN SER CALC-MCNC: 4 G/DL (ref 2–4)
GLUCOSE SERPL-MCNC: 96 MG/DL (ref 65–100)
GLUCOSE UR STRIP.AUTO-MCNC: NEGATIVE MG/DL
HCG UR QL: NEGATIVE
HCG UR QL: NEGATIVE
HCT VFR BLD AUTO: 40.1 % (ref 35–47)
HGB BLD-MCNC: 14.1 G/DL (ref 11.5–16)
HGB UR QL STRIP: NEGATIVE
IMM GRANULOCYTES # BLD AUTO: 0 K/UL (ref 0–0.04)
IMM GRANULOCYTES NFR BLD AUTO: 0 % (ref 0–0.5)
KETONES UR QL STRIP.AUTO: 40 MG/DL
LEUKOCYTE ESTERASE UR QL STRIP.AUTO: ABNORMAL
LIPASE SERPL-CCNC: 106 U/L (ref 73–393)
LYMPHOCYTES # BLD: 1.7 K/UL (ref 0.8–3.5)
LYMPHOCYTES NFR BLD: 27 % (ref 12–49)
MCH RBC QN AUTO: 32.3 PG (ref 26–34)
MCHC RBC AUTO-ENTMCNC: 35.2 G/DL (ref 30–36.5)
MCV RBC AUTO: 91.8 FL (ref 80–99)
MONOCYTES # BLD: 0.6 K/UL (ref 0–1)
MONOCYTES NFR BLD: 10 % (ref 5–13)
NEUTS SEG # BLD: 3.7 K/UL (ref 1.8–8)
NEUTS SEG NFR BLD: 60 % (ref 32–75)
NITRITE UR QL STRIP.AUTO: NEGATIVE
NRBC # BLD: 0 K/UL (ref 0–0.01)
NRBC BLD-RTO: 0 PER 100 WBC
PH UR STRIP: 6.5 [PH] (ref 5–8)
PLATELET # BLD AUTO: 273 K/UL (ref 150–400)
PMV BLD AUTO: 9.7 FL (ref 8.9–12.9)
POTASSIUM SERPL-SCNC: 3.6 MMOL/L (ref 3.5–5.1)
PROT SERPL-MCNC: 7.8 G/DL (ref 6.4–8.2)
PROT UR STRIP-MCNC: NEGATIVE MG/DL
RBC # BLD AUTO: 4.37 M/UL (ref 3.8–5.2)
RBC #/AREA URNS HPF: ABNORMAL /HPF (ref 0–5)
SODIUM SERPL-SCNC: 138 MMOL/L (ref 136–145)
SP GR UR REFRACTOMETRY: 1.01 (ref 1–1.03)
UROBILINOGEN UR QL STRIP.AUTO: 0.2 EU/DL (ref 0.2–1)
WBC # BLD AUTO: 6.1 K/UL (ref 3.6–11)
WBC URNS QL MICRO: ABNORMAL /HPF (ref 0–4)

## 2019-10-23 PROCEDURE — 74011250636 HC RX REV CODE- 250/636: Performed by: NURSE PRACTITIONER

## 2019-10-23 PROCEDURE — 81025 URINE PREGNANCY TEST: CPT

## 2019-10-23 PROCEDURE — 83690 ASSAY OF LIPASE: CPT

## 2019-10-23 PROCEDURE — 87086 URINE CULTURE/COLONY COUNT: CPT

## 2019-10-23 PROCEDURE — 36415 COLL VENOUS BLD VENIPUNCTURE: CPT

## 2019-10-23 PROCEDURE — 96374 THER/PROPH/DIAG INJ IV PUSH: CPT

## 2019-10-23 PROCEDURE — 80053 COMPREHEN METABOLIC PANEL: CPT

## 2019-10-23 PROCEDURE — 96375 TX/PRO/DX INJ NEW DRUG ADDON: CPT

## 2019-10-23 PROCEDURE — 81001 URINALYSIS AUTO W/SCOPE: CPT

## 2019-10-23 PROCEDURE — 99284 EMERGENCY DEPT VISIT MOD MDM: CPT

## 2019-10-23 PROCEDURE — 85025 COMPLETE CBC W/AUTO DIFF WBC: CPT

## 2019-10-23 PROCEDURE — 74011250637 HC RX REV CODE- 250/637: Performed by: NURSE PRACTITIONER

## 2019-10-23 RX ORDER — NITROFURANTOIN 25; 75 MG/1; MG/1
100 CAPSULE ORAL
Status: COMPLETED | OUTPATIENT
Start: 2019-10-23 | End: 2019-10-23

## 2019-10-23 RX ORDER — NITROFURANTOIN 25; 75 MG/1; MG/1
100 CAPSULE ORAL 2 TIMES DAILY
Qty: 14 CAP | Refills: 0 | Status: SHIPPED | OUTPATIENT
Start: 2019-10-23 | End: 2019-10-30

## 2019-10-23 RX ORDER — KETOROLAC TROMETHAMINE 30 MG/ML
30 INJECTION, SOLUTION INTRAMUSCULAR; INTRAVENOUS ONCE
Status: COMPLETED | OUTPATIENT
Start: 2019-10-23 | End: 2019-10-23

## 2019-10-23 RX ORDER — ONDANSETRON 2 MG/ML
4 INJECTION INTRAMUSCULAR; INTRAVENOUS
Status: COMPLETED | OUTPATIENT
Start: 2019-10-23 | End: 2019-10-23

## 2019-10-23 RX ADMIN — ONDANSETRON 4 MG: 2 INJECTION INTRAMUSCULAR; INTRAVENOUS at 19:46

## 2019-10-23 RX ADMIN — KETOROLAC TROMETHAMINE 30 MG: 30 INJECTION, SOLUTION INTRAMUSCULAR at 19:48

## 2019-10-23 RX ADMIN — NITROFURANTOIN MONOHYDRATE/MACROCRYSTALLINE 100 MG: 25; 75 CAPSULE ORAL at 21:25

## 2019-10-23 NOTE — ED TRIAGE NOTES
Pt reports abdominal pain, nausea, and vomiting. Diagnosed with a UTI at [facility] and prescribed cipro yesterday.

## 2019-10-23 NOTE — ED NOTES
Bedside and Verbal shift change report given to [address] [place] [address] (oncoming nurse) by Floyd Hughes (offgoing nurse). Report included the following information SBAR.

## 2019-10-23 NOTE — ED PROVIDER NOTES
27 y.o. female with past medical history significant for scoliosis, presents ambulatory to the ED with chief complaint of lower abdominal pain. Patient states that she felt the onset of lower abdominal pain \"three days ago\", accompanied by lower back pain. She went to [facility] yesterday for initial evaluation of her symptoms and was diagnosed with a \"bladder infection\". Notes that her urine was sent for culture, and she was prescribed Cipro and Bentyl. Patient reports that she has taken three doses of the abx, but last night she felt the onset of nausea and vomiting. Continued to have vomiting all throughout the night last night, and her last episode was at ~0700 this morning. Patient reports that her abdominal pain has become more severe, especially directly before voiding. She rates her current level of discomfort as a 6/10 in severity. Patient developed a \"low grade\" fever this afternoon. She reports a history of similar symptoms approximately two months ago where Thom Hyman thought I might have kidney stones\". Denies current chance of pregnancy secondary to Nuvaring/ negative pregnancy test at [facility]. She denies abdominal surgical history. There are no other acute medical concerns at this time. Social hx: Denies Tobacco use; Denies EtOH use; Denies Illicit Drug Abuse    PCP: Jasper Tejeda MD    Past Medical History:  07/2019: Broken foot      Comment:  right foot, no surgery-wore boot/cast for 6 weeks  No date: Premature birth      Comment:  at 29 weeks  9/27/19 neg: Routine Papanicolaou smear  No date: Scoliosis  Past Surgical History:  06/2018: HX KNEE ARTHROSCOPY; Right      Note written by Franklyn Jackson, as dictated by Jennifer Villarreal NP 6:36 PM            Past Medical History:   Diagnosis Date    Broken foot 07/2019    right foot, no surgery-wore boot/cast for 6 weeks    Premature birth     at 29 weeks    Routine Papanicolaou smear 9/27/19 neg    Scoliosis Past Surgical History:   Procedure Laterality Date    HX KNEE ARTHROSCOPY Right 06/2018         Family History:   Problem Relation Age of Onset    No Known Problems Mother     No Known Problems Father     Thyroid Disease Sister         hypothyroidism    Lupus Sister     Other Sister         common variable immune deficiency    Neuropathy Sister     Other Maternal Grandfather         CFH    Breast Cancer Paternal Grandmother     Breast Cancer Other        Social History     Socioeconomic History    Marital status: SINGLE     Spouse name: Not on file    Number of children: Not on file    Years of education: Not on file    Highest education level: Not on file   Occupational History     Comment: works at [employer] resource strain: Not on file    Food insecurity:     Worry: Not on file     Inability: Not on file    Transportation needs:     Medical: Not on file     Non-medical: Not on file   Tobacco Use    Smoking status: Never Smoker    Smokeless tobacco: Never Used   Substance and Sexual Activity    Alcohol use: No    Drug use: No    Sexual activity: Yes     Partners: Male     Birth control/protection: Condom   Lifestyle    Physical activity:     Days per week: Not on file     Minutes per session: Not on file    Stress: Not on file   Relationships    Social connections:     Talks on phone: Not on file     Gets together: Not on file     Attends Judaism service: Not on file     Active member of club or organization: Not on file     Attends meetings of clubs or organizations: Not on file     Relationship status: Not on file    Intimate partner violence:     Fear of current or ex partner: Not on file     Emotionally abused: Not on file     Physically abused: Not on file     Forced sexual activity: Not on file   Other Topics Concern    Not on file   Social History Narrative    Not on file         ALLERGIES: Latex;  Iodine; Pcn [penicillins]; and Sulfa (sulfonamide antibiotics)    Review of Systems   Constitutional: Positive for fever. Negative for appetite change, chills, diaphoresis and fatigue. HENT: Negative for congestion, ear discharge, ear pain, facial swelling, sinus pressure, sinus pain, sore throat and trouble swallowing. Eyes: Negative for photophobia, pain, redness and visual disturbance. Respiratory: Negative for chest tightness, shortness of breath and wheezing. Cardiovascular: Negative for chest pain, palpitations and leg swelling. Gastrointestinal: Positive for abdominal pain, nausea and vomiting. Negative for abdominal distention and diarrhea. Endocrine: Negative. Negative for polyuria. Genitourinary: Negative for difficulty urinating, flank pain, frequency and urgency. Musculoskeletal: Positive for back pain. Negative for arthralgias, neck pain and neck stiffness. Skin: Negative for color change, pallor, rash and wound. Allergic/Immunologic: Negative. Negative for immunocompromised state. Neurological: Negative for dizziness, speech difficulty, weakness and headaches. Hematological: Does not bruise/bleed easily. Psychiatric/Behavioral: Negative for behavioral problems and confusion. The patient is not nervous/anxious. All other systems reviewed and are negative. Vitals:    10/23/19 1837   BP: 123/79   Pulse: (!) 109   Resp: 16   Temp: 98.6 °F (37 °C)   SpO2: 98%   Weight: 59 kg (130 lb)   Height: 5' 3\" (1.6 m)            Physical Exam   Constitutional: She is oriented to person, place, and time. She appears well-developed and well-nourished. No distress. HENT:   Head: Normocephalic and atraumatic. Right Ear: External ear normal.   Left Ear: External ear normal.   Nose: Nose normal.   Mouth/Throat: Oropharynx is clear and moist.   Eyes: Pupils are equal, round, and reactive to light. Conjunctivae and EOM are normal. Right eye exhibits no discharge. Left eye exhibits no discharge.    Neck: Normal range of motion. Neck supple. No JVD present. No tracheal deviation present. Cardiovascular: Normal rate, regular rhythm, normal heart sounds and intact distal pulses. Exam reveals no gallop. No murmur heard. Pulmonary/Chest: Effort normal and breath sounds normal. No respiratory distress. She has no wheezes. She has no rales. She exhibits no tenderness. Abdominal: Soft. Bowel sounds are normal. She exhibits no distension. There is tenderness (low abdominal pain). There is no rebound and no guarding. Genitourinary:   Genitourinary Comments: Stated urinary urgency and frequency,   Positive UTI   Musculoskeletal: Normal range of motion. She exhibits no edema or tenderness. Neurological: She is alert and oriented to person, place, and time. Skin: Skin is warm and dry. No rash noted. No erythema. No pallor. Psychiatric: She has a normal mood and affect. Her behavior is normal. Judgment and thought content normal.   Nursing note and vitals reviewed. MDM  Number of Diagnoses or Management Options  Urinary tract infection without hematuria, site unspecified: established and worsening  Diagnosis management comments: Plan:  Discharge to home and follow up with PCP. Changes antibiotic to Macrobid and urine culture sent. Return to the emergency room with worsening symptoms. Patient in agreement with plan of care. Amount and/or Complexity of Data Reviewed  Clinical lab tests: ordered and reviewed           9:06 PM  Pt has been reexamined. Pt has no new complaints, changes or physical findings. Care plan outlined and precautions discussed. All available results were reviewed with pt. All medications were reviewed with pt. All of pt's questions and concerns were addressed. Pt agrees to F/U as instructed and agrees to return to ED upon further deterioration. Pt is ready to go home.   Dougie Alejandra NP        Procedures

## 2019-10-24 NOTE — ED NOTES
Patient discharged from ED by provider. Discharge instructions reviewed with patient and all questions answered. Patient Ambulatory from ED in [facility].

## 2019-10-25 LAB
BACTERIA SPEC CULT: NORMAL
CC UR VC: NORMAL
SERVICE CMNT-IMP: NORMAL

## 2020-06-25 ENCOUNTER — OFFICE VISIT (OUTPATIENT)
Dept: RHEUMATOLOGY | Age: 25
End: 2020-06-25

## 2020-06-25 VITALS
HEIGHT: 63 IN | DIASTOLIC BLOOD PRESSURE: 82 MMHG | HEART RATE: 87 BPM | SYSTOLIC BLOOD PRESSURE: 109 MMHG | RESPIRATION RATE: 18 BRPM | WEIGHT: 150 LBS | TEMPERATURE: 97.8 F | BODY MASS INDEX: 26.58 KG/M2

## 2020-06-25 DIAGNOSIS — Z79.60 LONG-TERM USE OF IMMUNOSUPPRESSANT MEDICATION: ICD-10-CM

## 2020-06-25 DIAGNOSIS — M54.2 MUSCULOSKELETAL NECK PAIN: ICD-10-CM

## 2020-06-25 DIAGNOSIS — M79.18 RHOMBOID MUSCLE PAIN: ICD-10-CM

## 2020-06-25 DIAGNOSIS — M45.6 ANKYLOSING SPONDYLITIS OF LUMBAR REGION (HCC): Primary | ICD-10-CM

## 2020-06-25 RX ORDER — BACLOFEN 10 MG/1
5 TABLET ORAL AS NEEDED
COMMUNITY
End: 2022-02-23 | Stop reason: ALTCHOICE

## 2020-06-25 RX ORDER — TIZANIDINE HYDROCHLORIDE 2 MG/1
2 CAPSULE, GELATIN COATED ORAL 3 TIMES DAILY
COMMUNITY
End: 2021-08-23 | Stop reason: DRUGHIGH

## 2020-06-25 RX ORDER — MELOXICAM 15 MG/1
15 TABLET ORAL DAILY
COMMUNITY
End: 2021-11-18

## 2020-06-25 RX ORDER — ETANERCEPT 50 MG/ML
50 SOLUTION SUBCUTANEOUS
Qty: 4 EACH | Refills: 11 | Status: SHIPPED | OUTPATIENT
Start: 2020-06-25 | End: 2020-07-07

## 2020-06-25 NOTE — PROGRESS NOTES
REASON FOR VISIT    This is the initial evaluation for Ms. Suzan Pineda a 27 y.o.  female for question of a seronegative spondyloarthritis. [facility] request of Dr. Lissette Alanis. HISTORY OF PRESENT ILLNESS     I have reviewed and summarized old records from [id number] [place] [address],[address], [facility], [facility], and [facility]. She has a history of scoliosis with chronic neck and back pain. In 2015, she had severe low back pain and tail bone pain at rest such as sitting. Her pain is constant aching and all day. It is present in the morning and at night that is severe and improves with activity. She noted worse pain at rest. She has tried taking NSAIDs without relief. She had seen rheumatology and was told she had hypermobility. She had done physical therapy without relief. In 3/16/2015, Scoliosis radiograph showed Convex right scoliosis T11-L3 measures 16 degrees. Only minimal rotational component. No vertebral body anomaly is seen. The left femoral head is 15 mm higher than the right compatible with a potentially significant leg length discrepancy. Risser stage V. In 2/29/2016, Pelvis radiograph showed no fracture or dislocation, lytic or blastic lesion of the pelvis. In 4/28/2016, Triple Phase Bone Scan showed No prior imaging is available for comparison. There is slight scoliosis dextroconvex upper lumbar spine. Planar imaging demonstrates no focal areas of abnormal bony activity. SPECT imaging demonstrates no focal areas of abnormal bony activity. In 6/28/2017, Left Knee radiograph showed The knee demonstrates anatomical alignment. The joint spaces are grossly preserved on this non-weightbearing study. No significant osteophyte formation is present. No acute fracture or suprapatellar joint effusion is identified. In 4/25/2018, MRI Left Knee without contrast showed The menisci and cruciate ligaments remain intact.  The MCL and the lateral collateral ligaments complex are unremarkable. The extensor mechanism is within normal limits. The TT-TG offset measures 17 mm. The distal iliotibial band images normally. There is no knee joint effusion or Baker's cyst. There is no medial plica. No focal cartilage defect is identified. The bone marrow signal is within normal limits. The regional soft tissues image normally. In 9/19/2018, MRI Lumbar Spine without contrast showed Bone marrow: Normal MRI signal characteristics. Conus/cauda equina: The conus ends at L1 and is normal in contour and signal characteristics. No abnormalities identified along the cauda equina. Intervertebral discs: T12-L1: Normal for age. L1-L2: Normal for age. L2-L3: Normal for age. L3-L4: Normal for age. L4-L5: Normal for age. L5-S1: Normal for age. Sacrum is normal in appearance without evidence of fracture. Visualized portions of the sacroiliac joints are unremarkable. Paraspinal soft tissues: Unremarkable. In 10/30/2018, labs showed negative KYUNG direct, rheumatoid factor. In 2/22/2019, she was evaluated by [address] [place] [address] neurology for left leg pain and paresthesia. EMG/NCS showed showed normal and symmetrical response in sural, superficial peroneal SNAP, normal left peroneal and tibial motor conduction studies. Needle examination showed no evidence of active denervation, foot muscles were not activated fully. Labs showed negative c-ANCA, p-ANCA, atypical pANCA. In 4/16/2019, CT Abdomen and Pelvis without contrast showedThe lung bases are normal. The solid abdominal organs are normal within the limitations of a non-IV contrast exam. Neither kidney reveals evidence of definite calculi. The ureters are normal in caliber with no evidence of ureterolithiasis. The bowel is unopacified but normal in caliber. There is a normal appendix in the right lower quadrant. The uterus and ovaries are grossly normal. There is some trace fluid in the cul-de-sac.  Bone windows are unremarkable. In 6/03/2019, labs showed negative KYUNG direct, anti-dsDNA Ab, anti-Sm, anti-RNP. In 8/19/2019, CT Right Ankle without contrast showed Os navicularis measures 8 x 4 x 8 mm. A smooth, uniform soft tissue cleft is noted between this bone the remainder the navicular. Cortical margins are smooth. Bone island incidentally noted in the navicular. No acute fracture, coalition, concerning lytic or blastic lesion, degenerative change or alignment abnormality is evident. In 9/2019, she developed a new acute pain in mid upper right back pain. NSAID and APAP did not help. She did not do physical therapy. Pain persisted to 12/2020. She also developed pain in her right neck and pointing to her trapezius muscle, so ordered an MRI. In 1/24/2020, MRI Cervical Spine without contrast showed Alignment of the cervical spine is normal. Vertebral body height is well maintained. The craniocervical junction is normal. No abnormal signal is identified in the cervical or upper thoracic cord to indicate cord edema, myelomalacia or syrinx. C2/C3: Normal. C3/C4: Normal. C4/C5: Normal. C5/C6: Mild disc space narrowing is present with mild disc desiccation evident. There is no spinal stenosis or neural foraminal narrowing. C6/C7: Normal. C7/T1: Normal.    She was referred to pain management for injections but was not referred to physical therapy. In 2/10/2020, she received right sided C5, C6, C7 injections without benefit. In 3/13/2020, MRI Thoracic Spine without contrast showed Bone marrow: Bony vertebral elements have normal MR signal characteristics. Intervertebral disc spaces: The intervertebral disc spaces are normal for age. No herniated disc material is identified and the neuroforamina are patent. No canal stenosis. Cord: The cord is normal in contour and signal characteristics. Paraspinal soft tissues: Unremarkable.     In 3/25/2020, she received right sided C5, C6 injections medial branck block without benefit. In 5/14/2020, MRI Lumbar Spine without contrast showed There is mild bone marrow edema within the L1 spinous process and to a lesser extent within the T12 spinous process. No interspinous soft tissue edema is identified. There is mild rightward curvature at the thoracolumbar junction. The vertebral body alignment is otherwise appropriate. The vertebral body heights are well-preserved. There is no pars defect. The overall bone marrow signal is within normal limits. There is no significant disc desiccation. The conus is identified at the L1 level. There is no abnormal signal within the distal spinal cord. At T12-L1, there is no significant spinal canal stenosis or neural foraminal narrowing. At L1-L2, there is no significant spinal canal stenosis or neural foraminal narrowing. At L2-L3, there is no significant spinal canal stenosis or neural foraminal narrowing. At L3-L4, there is no significant spinal canal stenosis. There is mild bilateral neural foraminal narrowing secondary to small foraminal disc bulges. At L4-L5, there is no significant spinal canal stenosis. There is mild bilateral neural foraminal narrowing secondary to small foraminal disc bulges. At L5-S1, there is no significant spinal canal stenosis or neural foraminal narrowing. In 6/24/2020, Chest radiograph showed Mild scoliosis of the thoracic spine noted. These reveal no acute fractures, dislocations or cardiopulmonary process noted. Today, she continues to complain of aching pain constant and is worse at night in bed or when sitting for prolonged sitting. She has morning stiffness lasting up to hours that recurs after sitting. She has tried meloxicam, diclofenac without relief. She is doing physical for her neck and right trapezius muscles.      Therapy History includes:  Current NSAIDs include: meloxicam 15 mg daily  Current DMARD include: none  Prior DMARD therapy includes: none  The following DMARDs have been ineffective: none  The following DMARDs were stopped because of side effects: none    REVIEW OF SYSTEMS    A 15 point review of systems was performed and summarized below. The questionnaire was reviewed with the patient and scanned into the patient's medical record.     General: denies recent weight gain, recent weight loss, fatigue, weakness, fever, drenching night sweats  Musculoskeletal: endorses joint pain, muscle pain, morning stiffness (lasting 2 hours), denies joint swelling  Ears: denies ringing in ears, hearing loss, deafness  Eyes: denies pain, light sensitive, redness, blindness, double vision, blurred vision, excess tearing, dryness, foreign body sensation  Mouth: denies sore tongue, oral ulcers, loss of taste, dryness, increased dental caries  Nose: endorses nosebleeds, denies nasal ulcers  Throat: denies food stuck when swallowing, difficulty with swallowing, hoarseness, pain in jaw while chewing  Neck: denies swollen glands, tender glands  Cardiopulmonary: denies pain in chest with deep breaths, pain in chest when lying down, murmurs, sudden changes in heart beat, wheezing, dry cough, productive cough, shortness of breath at rest, shortness of breath on exertion, coughing of blood  Gastrointestinal: denies nausea, heartburn, stomach pain relieved by food, chronic constipation, chronic diarrhea, blood in stools, black stools  Genitourinary: denies vaginal dryness, pain or burning on urination, blood in urine, cloudy urine, vaginal ulcers   Hematologic: denies anemia, bleeding tendency, blood clots, bleeding gums  Skin: endorses easy bruising, denies hair loss, rash, rash worsened after sun exposure, hives/urticaria, skin thickening, skin tightness, nodules/bumps, color changes of hands or feet in the cold (Raynaud's)  Neurologic: endorses intermittent numbness or tingling in hands, numbness or tingling in feet, denies muscle weakness  Psychiatric: denies depression, excessive worries, PTSD, Bipolar  Sleep: endorses poor sleep (6-8 hours), difficulty staying asleep, denies snoring, apnea, daytime somnolence, difficulty falling asleep    PAST MEDICAL HISTORY    She has a past medical history of Broken foot (2019), Premature birth, Routine Papanicolaou smear (19 neg), and Scoliosis. FAMILY HISTORY    Her family history includes Breast Cancer in her paternal grandmother and another family member; Lupus in her sister; Neuropathy in her sister; No Known Problems in her father and mother; Other in her maternal grandfather and sister; Thyroid Disease in her sister. SOCIAL HISTORY    She reports that she has never smoked. She has never used smokeless tobacco. She reports that she does not drink alcohol or use drugs. GYNECOLOGIC HISTORY     0, Para 0, Living 0, Miscarriage 0    She denies severe pre-eclampsia, eclampsia or placental insufficiency    HEALTH MAINTENANCE    Immunizations    There is no immunization history on file for this patient. MEDICATIONS    Current Outpatient Medications   Medication Sig Dispense Refill    tiZANidine (ZANAFLEX) 2 mg capsule Take 2 mg by mouth three (3) times daily.  meloxicam (MOBIC) 15 mg tablet Take 15 mg by mouth daily.  baclofen (LIORESAL) 10 mg tablet Take 5 mg by mouth three (3) times daily.  EnbreL SureClick 50 mg/mL (1 mL) injection 0.98 mL by SubCUTAneous route every seven (7) days. 4 Each 11    fexofenadine HCl (FEXOFENADINE PO) Take  by mouth.  acetaminophen (TYLENOL PO) Take  by mouth.  ethinyl estradiol-etonogestrel (NUVARING) 0.12-0.015 mg/24 hr vaginal ring 1 Each by Intravaginal route every thirty (30) days. Insert 1 vaginal ring by vaginal route once a month. Leave in place for 3 weeks, remove for 1 week for 90 days. 3 Each 4    ibuprofen (MOTRIN) 200 mg tablet Take  by mouth every six (6) hours as needed for Pain.          ALLERGIES    Allergies   Allergen Reactions    Latex Hives    Iodine Hives and Nausea and Vomiting    Pcn [Penicillins] Hives    Sulfa (Sulfonamide Antibiotics) Not Reported This Time       PHYSICAL EXAMINATION    Visit Vitals  /82   Pulse 87   Temp 97.8 °F (36.6 °C)   Resp 18   Ht 5' 3\" (1.6 m)   Wt 150 lb (68 kg)   BMI 26.57 kg/m²     Body mass index is 26.57 kg/m². General: Patient is alert, oriented x 3, not in acute distress    HEENT:   Conjunctiva are not injected and appear moist, there is no alopecia, neck is supple    Cardiovascular:  Heart is regular rate and rhythm, no murmurs. Chest:  Lungs are clear to auscultation bilaterally. Extremities:  Free of clubbing, cyanosis, edema, extremities well perfused. Neurological:  Muscle strength is full in upper and lower extremities. Skin:    Psoriasis:     no  Nail Pitting:     no  Onycholysis:     no  Palmoplantar pustulosis:   no  Splinter Hemorrhage:    no  Leukonychia:     no  Acne fulminans:    no  Acne conglobata:    no  Hidradenitis Suppurativa:   no  Dissecting cellulitis of the scalp:  no  Pilonidal sinus:    no  Erythema nodosum:    no    Musculoskeletal:  A comprehensive musculoskeletal exam was performed for all joints of each upper and lower extremity and assessed for swelling, tenderness and range of motion. Tenderness of right trapezius muscles to rhomboid  Tenderness along lumbosacral spine    STEPHANIE: positive on left  Straight Leg Raise: positive on left  Modfied Scheober >5cm    Right patellar laxity  Left knee patellar stability s/p surgery    Costochondritis:  no   Synovitis:   no  Dactylitis:   no  Enthesitis:   no    No flowsheet data found. DATA REVIEW    Prior medical records were reviewed and are summarized as below:    Laboratory data: summarized in the HPI    Imaging: summarized in the HPI. ASSESSMENT AND PLAN    1) Ankylosing Spondylitis.  (inflammatory back pain, spinous process edema T12-L1)    She has a chronic history of inflammatory back pain described as aching pain and stiffness worse at rest, such as in the morning or at night when laying in bed and improves with activity. She has stiffness lasting at least 2 hours in the morning and has recurrence after prolonged rest. She has not had any relief with NSAIDs (ibuprofne, diclofenac, or meloxicam). Biologic therapy with anti-TNF have been shown to significantly improve disease activity and function in ankylosing spondylitis patients (Daryn JAMES et al. Dennie Hand Rheum Dis. Efficacy of TNF? blockers in patients with ankylosing spondylitis and non-radiographic axial spondyloarthritis: a meta-analysis. 2015;74(6):1241). I discussed with her about advancing therapy to a biologic (anti-TNF). We discussed the potential adverse effects, which include infections, such as upper respiratory infections, latent TB reactivation, viral hepatitis activation, and routes of administration (oral versus infusion versus subcutaneous). The patient was informed about the low risk for lymphoma based on at least 5 years of post-market data and the likely inherent relation between chronic autoimmune diseases, such as Rheumatoid Arthritis, and lymphoma. The patient was informed these medications co-pay are subject to the patient's insurance coverage and we will not know until it has been submitted to the insurance company. An order will be submitted today Enbrel. I gave her lab slips for labs (Quantiferon TB, chronic hepatitis panel)    2) Long Term Use of Immunosuppressants. The patient will initiate on immunomodulatory medications (Enbrel) and requires frequent toxicity monitoring by blood work. Respective labs were ordered (Quantiferon TB, chronic viral hepatitis). 3) Musculoskeletal Neck Pain. This is likely form her poor posture and forward stooping neck posture. The etiology is usually from chronic slouching due to reading, smart phone use, video eyad, and/or computer work. This will cause muscular tension and spasms, which may result with referred headaches.  This is not an inflammatory process nor is it an immune mediated condition. It is mechanical and should be treated with targeted physical therapy with the goal of realigning the neck and shoulder girdle into the normal anatomic alignment. Muscle relaxants and analgesics should be used as supportive treatment. I recommend physical therapy. She has been referred physical therapy. 4) Right Rhomboid Muscle Pain. This is likely muscle spasm due to posture and scoliosis. The patient voiced understanding of the aforementioned assessment and plan. Summary of plan was provided in the After Visit Summary patient instructions. I also provided education about MyChart setup and utility.     TODAY'S ORDERS    Orders Placed This Encounter    QUANTIFERON-TB GOLD PLUS    CYCLIC CITRUL PEPTIDE AB, IGG    CHRONIC HEPATITIS PANEL    HLA-B27    RHEUMATOID FACTOR, QL    PROTEIN ELECTROPHORESIS W/ REFLX POOL    EnbreL SureClick 50 mg/mL (1 mL) injection     Future Appointments   Date Time Provider Carlos Alberto Neyda   9/16/2020  4:00 PM MD Jevon Nicolas MD, [address] [facility]    Adult Rheumatology   Rheumatology Ultrasound Certified  [facility]  [facility], [address] [place] [address]   Phone [phone]  Fax [phone]

## 2020-06-25 NOTE — LETTER
6/25/20 Patient: Trevin Virk YOB: 1995 Date of Visit: 6/25/2020 Michele Reno MD 
[address] [address] [address] [facility] [address] VIA Facsimile: [phone] Dear Michele Reno MD, Thank you for referring Ms. Trevin Virk to [facility] for evaluation. My notes for this consultation are attached. If you have questions, please do not hesitate to call me. I look forward to following your patient along with you.  
 
 
Sincerely, 
 
Loni Parsons MD

## 2020-06-25 NOTE — PATIENT INSTRUCTIONS
Etanercept (By injection) Etanercept (ee-TAN-er-sept) Treats rheumatoid arthritis, psoriatic arthritis, juvenile idiopathic arthritis, ankylosing spondylitis, and plaque psoriasis. Brand Name(s): Enbrel There may be other brand names for this medicine. When This Medicine Should Not Be Used: This medicine is not right for everyone. Do not use it if you had an allergic reaction to etanercept. How to Use This Medicine:  
Injectable · Your doctor will prescribe your exact dose and tell you how often it should be given. This medicine is given as a shot under your skin. · A nurse or other health provider will give you this medicine. · You may be taught how to give your medicine at home. Make sure you understand all instructions before giving yourself an injection. Do not use more medicine or use it more often than your doctor tells you to. · Allow the medicine to come to room temperature for 15 to 30 minutes before you use it. · Vial: ¨ Mix the medicine with the liquid provided in your dose kit. Gently swirl the medicine to mix it. Do not shake. ¨ Write the date you mixed the medicine on the sticker from the dose kit. Attach the sticker to the vial. 
¨ After your dose, put the unused mixture in the refrigerator right away. Do not mix vials together. Throw away any unused medicine after 14 days. · Prefilled syringe or autoinjector: ¨ Do not remove the needle cover from the syringe or autoinjector until you are ready to use it. ¨ If the amount of liquid in the prefilled syringe does not fall between the purple indicator lines, do not use that syringe. · Throw away used needles in a hard, closed container that the needles cannot poke through. Keep this container away from children and pets. · This medicine should come with a Medication Guide. Ask your pharmacist for a copy if you do not have one. · Missed dose: Call your doctor or pharmacist for instructions. · If you store this medicine at home, keep it in the refrigerator. Do not freeze. Drugs and Foods to Avoid: Ask your doctor or pharmacist before using any other medicine, including over-the-counter medicines, vitamins, and herbal products. · Some medicines can affect how etanercept work. Tell your doctor if you are using any of the following: ¨ Abatacept, anakinra, cyclophosphamide ¨ Steroid medicine (including dexamethasone, hydrocortisone, methylprednisolone, prednisone, prednisolone) · This medicine may interfere with vaccines. Ask your doctor before you get a flu shot or any other vaccines. Warnings While Using This Medicine: · Tell your doctor if you are pregnant or breastfeeding, or if you have diabetes, heart failure, liver problems including hepatitis, multiple sclerosis or any nervous system problem, Wegener granulomatosis, or a history of cancer, seizures, or Guillain-Barré syndrome. Tell your doctor if you are allergic to latex. · This medicine may cause you to get infections more easily. Tell your doctor if you have any type of infection before you start treatment. Also tell your doctor if you or a family member has a history of tuberculosis (TB). Take precautions to avoid illness. Wash your hands often. · This medicine may cause the following problems:  
¨ Increased risk of cancer, including lymphoma, leukemia, and skin cancer ¨ Nervous system problems ¨ Autoimmune problems, including lupus-like syndrome and autoimmune hepatitis ¨ Heart failure · Your doctor will check your progress and the effects of this medicine at regular visits. Keep all appointments. · Keep all medicine out of the reach of children. Never share your medicine with anyone. Possible Side Effects While Using This Medicine:  
Call your doctor right away if you notice any of these side effects: · Allergic reaction: Itching or hives, swelling in your face or hands, swelling or tingling in your mouth or throat, chest tightness, trouble breathing · Blistering, peeling, red skin rash · Change in how much or how often you urinate, painful or difficult urination · Change in vision, eye pain · Chest pain, coughing up blood, muscle pain, night sweats, weight loss · Dark urine or pale stools, nausea, vomiting, loss of appetite, stomach pain, yellow skin or eyes · Fever, chills, cough, runny or stuffy nose, sore throat, body aches · Numbness, tingling, or burning pain in your hands, arms, legs, or feet · Seizures · Skin changes or growths, red, scaly patches on the skin · Sores or white patches on your lips, mouth, or throat · Swollen glands in your neck, armpits, or groin · Trouble breathing, cold sweat, blue skin, swelling in your hands, ankles, or feet · Unusual bleeding, bruising, tiredness, or weakness If you notice these less serious side effects, talk with your doctor: · Pain, redness, swelling, itching, bleeding, or bruising where the shot was given If you notice other side effects that you think are caused by this medicine, tell your doctor. Call your doctor for medical advice about side effects. You may report side effects to FDA at 3-734-FDA-7589 © 2017 [address] Information is for End User's use only and may not be sold, redistributed or otherwise used for commercial purposes. The above information is an  only. It is not intended as medical advice for individual conditions or treatments. Talk to your doctor, nurse or pharmacist before following any medical regimen to see if it is safe and effective for you.

## 2020-07-01 ENCOUNTER — TELEPHONE (OUTPATIENT)
Dept: RHEUMATOLOGY | Age: 25
End: 2020-07-01

## 2020-07-01 LAB
ALBUMIN SERPL ELPH-MCNC: 4 G/DL (ref 2.9–4.4)
ALBUMIN/GLOB SERPL: 1.4 {RATIO} (ref 0.7–1.7)
ALPHA1 GLOB SERPL ELPH-MCNC: 0.3 G/DL (ref 0–0.4)
ALPHA2 GLOB SERPL ELPH-MCNC: 0.6 G/DL (ref 0.4–1)
B-GLOBULIN SERPL ELPH-MCNC: 1.1 G/DL (ref 0.7–1.3)
CCP IGA+IGG SERPL IA-ACNC: 4 UNITS (ref 0–19)
COMMENT, 144067: NORMAL
GAMMA GLOB SERPL ELPH-MCNC: 0.9 G/DL (ref 0.4–1.8)
GAMMA INTERFERON BACKGROUND BLD IA-ACNC: 0.04 IU/ML
GLOBULIN SER CALC-MCNC: 2.9 G/DL (ref 2.2–3.9)
HBV CORE AB SERPL QL IA: NEGATIVE
HBV CORE IGM SERPL QL IA: NEGATIVE
HBV E AB SERPL QL IA: NEGATIVE
HBV E AG SERPL QL IA: NEGATIVE
HBV SURFACE AB SER QL: NON REACTIVE
HBV SURFACE AG SERPL QL IA: NEGATIVE
HCV AB S/CO SERPL IA: 0.4 S/CO RATIO (ref 0–0.9)
HLA-B27 QL NAA+PROBE: NEGATIVE
M PROTEIN SERPL ELPH-MCNC: NORMAL G/DL
M TB IFN-G BLD-IMP: NEGATIVE
M TB IFN-G CD4+ BCKGRND COR BLD-ACNC: 0.1 IU/ML
MITOGEN IGNF BLD-ACNC: >10 IU/ML
PLEASE NOTE, 011150: NORMAL
PROT PATTERN SERPL ELPH-IMP: NORMAL
PROT SERPL-MCNC: 6.9 G/DL (ref 6–8.5)
QUANTIFERON INCUBATION, QF1T: NORMAL
QUANTIFERON TB2 AG: 0.04 IU/ML
RHEUMATOID FACT SERPL-ACNC: <10 IU/ML (ref 0–13.9)
SERVICE CMNT-IMP: NORMAL

## 2020-07-02 ENCOUNTER — TELEPHONE (OUTPATIENT)
Dept: RHEUMATOLOGY | Age: 25
End: 2020-07-02

## 2020-07-02 NOTE — TELEPHONE ENCOUNTER
----- Message from Des Cantu sent at 2020  3:24 PM EDT -----  Regarding: ORIANA/MD/TELEPHONE  Contact: [phone]  General Message/Vendor Call      Patient's first and last name:Sharon Pete  : 1995  ID numbers: #[id number] F#[id number]    Caller's first and last name: [address]  Reason for call: Discuss Enbrel  Callback required yes/no and why: Yes  Best contact number(s): [phone] Ref# [id number]  Details to clarify the request: [address] from [facility] would like to discuss Enbrel.

## 2020-07-06 ENCOUNTER — TELEPHONE (OUTPATIENT)
Dept: RHEUMATOLOGY | Age: 25
End: 2020-07-06

## 2020-07-06 NOTE — TELEPHONE ENCOUNTER
Returned call to [facility] to provide a verbal prescription for them to fill the Enbrel since a PA was completed and it was approved.

## 2020-07-07 ENCOUNTER — DOCUMENTATION ONLY (OUTPATIENT)
Dept: PHARMACY | Age: 25
End: 2020-07-07

## 2020-07-07 ENCOUNTER — TELEPHONE (OUTPATIENT)
Dept: RHEUMATOLOGY | Age: 25
End: 2020-07-07

## 2020-07-07 RX ORDER — ADALIMUMAB 40MG/0.4ML
40 KIT SUBCUTANEOUS
Qty: 1 KIT | Refills: 11 | Status: SHIPPED | OUTPATIENT
Start: 2020-07-07 | End: 2020-07-28 | Stop reason: SINTOL

## 2020-07-07 NOTE — PROGRESS NOTES
[facility] [address] [facility] Update    Date: 7/2/2020    Lexis Leak 1995    Medication: Enbrel 52JA/GN Sureclick    Prescription transferred to specialty pharmacy: [facility]    Phone: [phone]    Pharmacist counseled the patient and informed patient their prescription was transferred to the mandated specialty pharmacy and gave patient the specialty pharmacy's phone number. Pharmacist answered any questions that the patient had.     Kristina Koenig, [address] at [place] [address] Kaelyn Matias, [address]  phone: [phone]   fax: [phone]'

## 2020-07-07 NOTE — TELEPHONE ENCOUNTER
Josef At [address] [place] [address] contacted us to let us know that there is latex in the enbrel pen cap and wanted to know if we wanted to dispense since the pt has a latex allergy? I asked them if the enbrel mini cartridge contained latex and they said that it did. I inquired about Humira and they stated that it does not contain latex. A new prescription will be sent for Humira since that is what the pt will be able to take.

## 2020-07-08 ENCOUNTER — DOCUMENTATION ONLY (OUTPATIENT)
Dept: PHARMACY | Age: 25
End: 2020-07-08

## 2020-07-08 NOTE — PROGRESS NOTES
[facility] at [address] [facility] Update    Date: 07/08/20    Michael Pichardo 1995    Medication: Humira CF 40mg/0.4mL pen    Prescription transferred to specialty pharmacy: [facility]    Phone: [phone]    Pharmacist counseled the patient and informed patient their prescription was transferred to the mandated specialty pharmacy and gave patient the specialty pharmacy's phone number. Pharmacist answered any questions that the patient had.     [facility], [address] at [facility],  Kaelyn Matias, [address]  phone: [phone]   fax: [phone]

## 2020-07-28 ENCOUNTER — VIRTUAL VISIT (OUTPATIENT)
Dept: RHEUMATOLOGY | Age: 25
End: 2020-07-28

## 2020-07-28 DIAGNOSIS — Z79.60 LONG-TERM USE OF IMMUNOSUPPRESSANT MEDICATION: ICD-10-CM

## 2020-07-28 DIAGNOSIS — M54.2 MUSCULOSKELETAL NECK PAIN: ICD-10-CM

## 2020-07-28 DIAGNOSIS — M45.6 ANKYLOSING SPONDYLITIS OF LUMBAR REGION (HCC): Primary | ICD-10-CM

## 2020-07-28 DIAGNOSIS — M79.18 RHOMBOID MUSCLE PAIN: ICD-10-CM

## 2020-07-28 RX ORDER — ETANERCEPT 50 MG/ML
50 SOLUTION SUBCUTANEOUS
Qty: 4 EACH | Refills: 11 | Status: SHIPPED | OUTPATIENT
Start: 2020-07-28 | End: 2020-08-26 | Stop reason: ALTCHOICE

## 2020-07-28 NOTE — PROGRESS NOTES
REASON FOR VISIT    This is a follow-up visit for Ms. Marcel Hathaway for     ICD-10-CM   1. Ankylosing spondylitis of lumbar region [facility])  M45.6     The patient has consented for synchronous (real-time) Telemedicine (audio technology) on 7/28/2020 for their care to be delivered over telemedicine in place of their regularly scheduled office visit pursuant to the emergency declaration under the [address] [place] [address] [facility] authority and the [employer] Act, this Virtual  Visit was conducted, with patient's consent, to reduce the patient's risk of exposure to COVID-19 and provide continuity of care for an established patient. Services were provided through an audio synchronous discussion virtually to substitute for in-person clinic visit.     Spondyloarthritis phenotype includes:  Anti-CCP positive: no  Rheumatoid factor positive: no  HLA-B27 positive: no  Inflammatory Back Pain: yes  Erosive disease: no  Sacroiliitis: no  Ankylosis: no  Psoriasis: no  Enthesitis: no  Dactylitis: no  Nail Pitting: no  Onycholysis: no  Splinter Hemorrhage: no  Extra-articular manifestations include: none  SAPHO: no    Immunosuppression Screening (6/26/2020):  Quantiferon TB: negative  PPD:  Not performed  Hepatitis B: negative  Hepatitis C: negative    Therapy History includes:  Prior NSAIDs include:   Current NSAIDs include: meloxicam 15 mg daily, ibuprofen 200 mg  Current DMARD therapy include: Humira 40 mg every 14 days (7/11/2020 to 7/28/2020)  Prior DMARD therapy include: None  Discontinued DMARDs because of inefficacy: None  Discontinued DMARDs because of side effects: Humira (nausea, vomiting, headache, injection site reaction)    Active problems include:    Patient Active Problem List   Diagnosis Code    Ankylosing spondylitis of lumbar region ([facility] [address] M45.6    Long-term use of immunosuppressant medication Z79.899     [address] [facility]    Ms. Rigoberto Najjar returns for a follow-up visit. On her last visit, I started her on Enbrel but due to latex, she was changed to Humira, which she started on 7/11/2020. She noticed nausea after her first dose and she was not sure if that was related to heat exposure, complicated by vomiting. She took Zofran which helped. She then messaged me after her second dose with recurrence of her nausea without vomiting but she also had a headache and an injection site reaction. She wants to try Enbrel despite latex allergy. She endorses nausea, ankle swelling after work in th evening. She denies fever, weight loss, blurred vision, vision loss, oral ulcers, dry cough, dyspnea, vomiting, dysphagia, abdominal pain, black or bloody stool, fall since last visit, rash, easy bruising and increased thirst.    The patient has not had any interval hospital admissions, infections, or surgeries. REVIEW OF SYSTEMS    A comprehensive review of systems was performed and pertinent results are documented in the HPI, review of systems is otherwise non-contributory. PAST MEDICAL HISTORY    She has a past medical history of Broken foot (07/2019), Premature birth, Routine Papanicolaou smear (9/27/19 neg), and Scoliosis. FAMILY HISTORY    Her family history includes Breast Cancer in her paternal grandmother and another family member; Lupus in her sister; Neuropathy in her sister; No Known Problems in her father and mother; Other in her maternal grandfather and sister; Thyroid Disease in her sister. SOCIAL HISTORY    She reports that she has never smoked. She has never used smokeless tobacco. She reports that she does not drink alcohol or use drugs. IMMUNIZATIONS    There is no immunization history on file for this patient. MEDICATIONS    Current Outpatient Medications   Medication Sig Dispense Refill    EnbreL SureClick 50 mg/mL (1 mL) injection 0.98 mL by SubCUTAneous route every seven (7) days.  4 Each 11    ethinyl estradiol-etonogestrel (NuvaRing) 0.12-0.015 mg/24 hr vaginal ring 1 Each by Intravaginal route every thirty (30) days. Insert 1 vaginal ring by vaginal route once a month. Leave in place for 3 weeks, remove for 1 week for 90 days. 3 Each 0    tiZANidine (ZANAFLEX) 2 mg capsule Take 2 mg by mouth three (3) times daily.  meloxicam (MOBIC) 15 mg tablet Take 15 mg by mouth as needed.  baclofen (LIORESAL) 10 mg tablet Take 5 mg by mouth as needed.  fexofenadine HCl (FEXOFENADINE PO) Take  by mouth.  acetaminophen (TYLENOL PO) Take  by mouth.  ibuprofen (MOTRIN) 200 mg tablet Take  by mouth every six (6) hours as needed for Pain. ALLERGIES    Allergies   Allergen Reactions    Latex Hives    Iodine Hives and Nausea and Vomiting    Pcn [Penicillins] Hives    Sulfa (Sulfonamide Antibiotics) Not Reported This Time       PHYSICAL EXAMINATION    There were no vitals taken for this visit. There is no height or weight on file to calculate BMI. General: Patient is alert, oriented x 3, not in acute distress    Chest:  Breathing comfortably at room air    Skin:    Exam deferred  Previous exam    Psoriasis:     no  Nail Pitting:     no  Onycholysis:     no  Palmoplantar pustulosis:   no  Splinter Hemorrhage:    no  Leukonychia:     no  Acne fulminans:    no  Acne conglobata:    no  Hidradenitis Suppurativa:   no  Dissecting cellulitis of the scalp:  no  Pilonidal sinus:    no  Erythema nodosum:    no    Musculoskeletal:  A comprehensive musculoskeletal exam was NOT performed for all joints of each upper and lower extremity and assessed for swelling, tenderness and range of motion.       Previous Exam    Tenderness of right trapezius muscles to rhomboid  Tenderness along lumbosacral spine     STEPHANIE: positive on left  Straight Leg Raise: positive on left  Modfied Scheober >5cm    Costochondritis:  no   Synovitis:   no  Dactylitis:   no  Enthesitis:   no    DATA REVIEW    Laboratory     Recent laboratory results were reviewed, summarized, and discussed with the patient. Imaging    Musculoskeletal Ultrasound    None    Radiographs    Chest 6/24/2020: Mild scoliosis of the thoracic spine noted. These reveal no acute fractures, dislocations or cardiopulmonary process noted. Left Knee 6/28/2017: The knee demonstrates anatomical alignment. The joint spaces are grossly preserved on this non-weightbearing study. No significant osteophyte formation is present. No acute fracture or suprapatellar joint effusion is identified. Pelvis 2/29/2016: no fracture or dislocation, lytic or blastic lesion of the pelvis. Scoliosis 3/16/2015: Convex right scoliosis T11-L3 measures 16 degrees. Only minimal rotational component. No vertebral body anomaly is seen. The left femoral head is 15 mm higher than the right compatible with a potentially significant leg length discrepancy. Risser stage V.    CT Imaging    CT Right Ankle without contrast 8/19/2019: Os navicularis measures 8 x 4 x 8 mm. A smooth, uniform soft tissue cleft is noted between this bone the remainder the navicular. Cortical margins are smooth. Bone island incidentally noted in the navicular. No acute fracture, coalition, concerning lytic or blastic lesion, degenerative change or alignment abnormality is evident. CT Abdomen and Pelvis without contrast 4/16/2019: The lung bases are normal. The solid abdominal organs are normal within the limitations of a non-IV contrast exam. Neither kidney reveals evidence of definite calculi. The ureters are normal in caliber with no evidence of ureterolithiasis. The bowel is unopacified but normal in caliber. There is a normal appendix in the right lower quadrant. The uterus and ovaries are grossly normal. There is some trace fluid in the cul-de-sac. Bone windows are unremarkable. MR Imaging    MRI Lumbar Spine without contrast 5/14/2020:  There is mild bone marrow edema within the L1 spinous process and to a lesser extent within the T12 spinous process. No interspinous soft tissue edema is identified. There is mild rightward curvature at the thoracolumbar junction. The vertebral body alignment is otherwise appropriate. The vertebral body heights are well-preserved. There is no pars defect. The overall bone marrow signal is within normal limits. There is no significant disc desiccation. The conus is identified at the L1 level. There is no abnormal signal within the distal spinal cord. At T12-L1, there is no significant spinal canal stenosis or neural foraminal narrowing. At L1-L2, there is no significant spinal canal stenosis or neural foraminal narrowing. At L2-L3, there is no significant spinal canal stenosis or neural foraminal narrowing. At L3-L4, there is no significant spinal canal stenosis. There is mild bilateral neural foraminal narrowing secondary to small foraminal disc bulges. At L4-L5, there is no significant spinal canal stenosis. There is mild bilateral neural foraminal narrowing secondary to small foraminal disc bulges. At L5-S1, there is no significant spinal canal stenosis or neural foraminal narrowing.     MRI Thoracic Spine without contrast 3/13/2020: Bone marrow: Bony vertebral elements have normal MR signal characteristics. Intervertebral disc spaces: The intervertebral disc spaces are normal for age. No herniated disc material is identified and the neuroforamina are patent. No canal stenosis. Cord: The cord is normal in contour and signal characteristics. Paraspinal soft tissues: Unremarkable. MRI Cervical Spine without contrast 1/24/2020: Alignment of the cervical spine is normal. Vertebral body height is well maintained. The craniocervical junction is normal. No abnormal signal is identified in the cervical or upper thoracic cord to indicate cord edema, myelomalacia or syrinx.  C2/C3: Normal. C3/C4: Normal. C4/C5: Normal. C5/C6: Mild disc space narrowing is present with mild disc desiccation evident. There is no spinal stenosis or neural foraminal narrowing. C6/C7: Normal. C7/T1: Normal.    MRI Lumbar Spine without contrast 9/19/2018: Bone marrow: Normal MRI signal characteristics. Conus/cauda equina: The conus ends at L1 and is normal in contour and signal characteristics. No abnormalities identified along the cauda equina. Intervertebral discs: T12-L1: Normal for age. L1-L2: Normal for age. L2-L3: Normal for age. L3-L4: Normal for age. L4-L5: Normal for age. L5-S1: Normal for age. Sacrum is normal in appearance without evidence of fracture. Visualized portions of the sacroiliac joints are unremarkable. Paraspinal soft tissues: Unremarkable. MRI Left Knee without contrast 4/25/2018: The menisci and cruciate ligaments remain intact. The MCL and the lateral collateral ligaments complex are unremarkable. The extensor mechanism is within normal limits. The TT-TG offset measures 17 mm. The distal iliotibial band images normally. There is no knee joint effusion or Baker's cyst. There is no medial plica. No focal cartilage defect is identified. The bone marrow signal is within normal limits. The regional soft tissues image normally. DXA     None    Nuclear Medicine    Triple Phase Bone Scan 4/28/2016: No prior imaging is available for comparison. There is slight scoliosis dextroconvex upper lumbar spine. Planar imaging demonstrates no focal areas of abnormal bony activity. SPECT imaging demonstrates no focal areas of abnormal bony activity    EMG/NCS    EMG/NCS 2/22/2019: normal and symmetrical response in sural, superficial peroneal SNAP, normal left peroneal and tibial motor conduction studies. Needle examination showed no evidence of active denervation, foot muscles were not activated fully. ASSESSMENT AND PLAN    This is a follow-up visit for Ms. Delmy Stanley. 1) Ankylosing Spondylitis.  (inflammatory back pain, spinous process edema T12-L1)     She has a chronic history of inflammatory back pain described as aching pain and stiffness worse at rest, such as in the morning or at night when laying in bed and improves with activity. She has stiffness lasting at least 2 hours in the morning and has recurrence after prolonged rest. She has not had any relief with NSAIDs (ibuprofen, diclofenac, or meloxicam). She received 2 doses of Humira and had reaction (nausea, vomiting, headache, injection site reaction), so I will discontinue. I had originally prescribed Enbrel which was approved but due to concern for Latex allergy, she was started on Enbrel. She informs me that she wants to try Enbrel over Taltz, so I submitted that to [facility]. I discontinued Humira due to intolerance.    2) Long Term Use of Immunosuppressants. The patient remains on immunomodulatory medications (Humira --> Enbrel) and requires frequent toxicity monitoring by blood work. 3) Musculoskeletal Neck Pain. This is likely form her poor posture and forward stooping neck posture. The etiology is usually from chronic slouching due to reading, smart phone use, video eyad, and/or computer work. This will cause muscular tension and spasms, which may result with referred headaches. This is not an inflammatory process nor is it an immune mediated condition. It is mechanical and should be treated with targeted physical therapy with the goal of realigning the neck and shoulder girdle into the normal anatomic alignment. Muscle relaxants and analgesics should be used as supportive treatment. I recommend physical therapy. She has been referred physical therapy.    4) Right Rhomboid Muscle Pain. This is likely muscle spasm due to posture and scoliosis. 5) Ankle Swelling. This is likely venous stasis. The patient voiced understanding of the aforementioned assessment and plan.      The patient has consented for synchronous (real-time) Telemedicine (audio technology) on 7/28/2020 for their care to be delivered over telemedicine in place of their regularly scheduled office visit pursuant to the emergency declaration under the [facility], [facility] waiver authority and the [employer] Act, this Virtual  Visit was conducted, with patient's consent, to reduce the patient's risk of exposure to COVID-19 and provide continuity of care for an established patient. Today, I personally spent 11 minutes in direct patient care with the patient, of which greater than 50% of the time was spent in patient education, counseling, and coordination of care as described above. All questions asked and answered. Services were provided through an audio synchronous discussion virtually to substitute for in-person clinic visit. TODAY'S ORDERS    Orders Placed This Encounter    EnbreL SureClick 50 mg/mL (1 mL) injection     No future appointments.   Megan Caballero MD, [address] [place] [address]    Adult Rheumatology   Rheumatology Ultrasound Certified  [address]  [facility], [address] [place] [address]   Phone [phone]  Fax [phone]

## 2020-08-26 ENCOUNTER — VIRTUAL VISIT (OUTPATIENT)
Dept: RHEUMATOLOGY | Age: 25
End: 2020-08-26
Payer: COMMERCIAL

## 2020-08-26 DIAGNOSIS — M45.6 ANKYLOSING SPONDYLITIS OF LUMBAR REGION (HCC): Primary | ICD-10-CM

## 2020-08-26 DIAGNOSIS — Z79.60 LONG-TERM USE OF IMMUNOSUPPRESSANT MEDICATION: ICD-10-CM

## 2020-08-26 PROCEDURE — 99442 PR PHYS/QHP TELEPHONE EVALUATION 11-20 MIN: CPT | Performed by: INTERNAL MEDICINE

## 2020-08-26 RX ORDER — ETANERCEPT 50 MG/ML
50 SOLUTION SUBCUTANEOUS
Qty: 4 CARTRIDGE | Refills: 11 | Status: SHIPPED | OUTPATIENT
Start: 2020-08-26 | End: 2020-09-04 | Stop reason: SDUPTHER

## 2020-08-26 NOTE — PROGRESS NOTES
REASON FOR VISIT    This is a follow-up visit for Ms. Leandra Lopez for     ICD-10-CM   1. Ankylosing spondylitis of lumbar region [facility])  M45.6     The patient has consented for synchronous (real-time) Telemedicine (audio technology) on 8/26/2020 for their care to be delivered over telemedicine in place of their regularly scheduled office visit pursuant to the emergency declaration under the [address] [place] [address] and the [employer] Act, this Virtual  Visit was conducted, with patient's consent, to reduce the patient's risk of exposure to COVID-19 and provide continuity of care for an established patient. Services were provided through an audio synchronous discussion virtually to substitute for in-person clinic visit.     Spondyloarthritis phenotype includes:  Anti-CCP positive: no  Rheumatoid factor positive: no  HLA-B27 positive: no  Inflammatory Back Pain: yes  Erosive disease: no  Sacroiliitis: no  Ankylosis: no  Psoriasis: no  Enthesitis: no  Dactylitis: no  Nail Pitting: no  Onycholysis: no  Splinter Hemorrhage: no  Extra-articular manifestations include: none  SAPHO: no    Immunosuppression Screening (6/26/2020):  Quantiferon TB: negative  PPD:  Not performed  Hepatitis B: negative  Hepatitis C: negative    Therapy History includes:  Current NSAIDs include: meloxicam 15 mg daily, ibuprofen 200 mg  Current DMARD therapy include: Enbrel 50 mg every Saturday (8/01/2020 to present)  Prior DMARD therapy include: Humira 40 mg every 14 days (7/11/2020 to 7/28/2020)  Discontinued DMARDs because of inefficacy: None  Discontinued DMARDs because of side effects: Humira (nausea, vomiting, headache, injection site reaction)    Active problems include:    Patient Active Problem List   Diagnosis Code    [facility] lumbar [facility] M45.6    Long-term use of immunosuppressant medication Z79.899     HISTORY OF PRESENT ILLNESS    Ms. Chanell Boykin returns for a follow-up visit. On her last visit, due to adverse effects on Humira, I changed her to Enbrel despite her latex allergy per her request. She messaged me over "[employer] on 8/17/2020 stating \"I have been using enbrel for several weeks now and I really feel like I can tell a difference in my pain. Unfortunately after my injection this last weekend, it was a little red afterwards but now its the size of a fist. Red, inflamed, raised & warm to touch. I really dont want to switch bc I feel like its helping but wanted to reach out since it hasnt happened before and I havent had any other side effects\". She did not have improvement with topical ice or hydrocortisone, so I discussed changing treatment. Today, she feels much better in her back. This is the best she has felt the she has in a long time. She has morning stiffness in her back lasting 20 to 30 minutes (prevoiously 3 to 4 hours). She no longer has lower pain. She endorses injection site rash. Pictures were sent to me. There is large erythema without pruritis or pain, but her most lesion is bruising. She denies fever, weight loss, blurred vision, vision loss, oral ulcers, ankle swelling, dry cough, dyspnea, nausea, vomiting, dysphagia, abdominal pain, black or bloody stool, fall since last visit, easy bruising and increased thirst.    The patient has not had any interval hospital admissions, infections, or surgeries. REVIEW OF SYSTEMS    A comprehensive review of systems was performed and pertinent results are documented in the HPI, review of systems is otherwise non-contributory. PAST MEDICAL HISTORY    She has a past medical history of Broken foot (07/2019), Premature birth, Routine Papanicolaou smear (9/27/19 neg), and Scoliosis. FAMILY HISTORY    Her family history includes Breast Cancer in her paternal grandmother and another family member; Lupus in her sister; Neuropathy in her sister;  No Known Problems in her father and mother; Other in her maternal grandfather and sister; Thyroid Disease in her sister. SOCIAL HISTORY    She reports that she has never smoked. She has never used smokeless tobacco. She reports that she does not drink alcohol or use drugs. IMMUNIZATIONS    There is no immunization history on file for this patient. MEDICATIONS    Current Outpatient Medications   Medication Sig Dispense Refill    etanercept (EnbreL Mini) 50 mg/mL (1 mL) crtg 50 mg by SubCUTAneous route every seven (7) days. Indications: rheumatoid arthritis 4 Cartridge 11    ethinyl estradiol-etonogestrel (NuvaRing) 0.12-0.015 mg/24 hr vaginal ring 1 Each by Intravaginal route every thirty (30) days. Insert 1 vaginal ring by vaginal route once a month. Leave in place for 3 weeks, remove for 1 week for 90 days. 3 Each 0    tiZANidine (ZANAFLEX) 2 mg capsule Take 2 mg by mouth three (3) times daily.  meloxicam (MOBIC) 15 mg tablet Take 15 mg by mouth as needed.  baclofen (LIORESAL) 10 mg tablet Take 5 mg by mouth as needed.  fexofenadine HCl (FEXOFENADINE PO) Take  by mouth daily.  acetaminophen (TYLENOL PO) Take  by mouth.  ibuprofen (MOTRIN) 200 mg tablet Take  by mouth every six (6) hours as needed for Pain. ALLERGIES    Allergies   Allergen Reactions    Latex Hives    Iodine Hives and Nausea and Vomiting    Pcn [Penicillins] Hives    Sulfa (Sulfonamide Antibiotics) Not Reported This Time       PHYSICAL EXAMINATION    There were no vitals taken for this visit. There is no height or weight on file to calculate BMI.     General: Patient is alert, oriented x 3, not in acute distress    Chest:  Breathing comfortably at room air    Skin:    Exam deferred  Previous exam    Psoriasis:     no  Nail Pitting:     no  Onycholysis:     no  Palmoplantar pustulosis:   no  Splinter Hemorrhage:    no  Leukonychia:     no  Acne fulminans:    no  Acne conglobata:    no  Hidradenitis Suppurativa:   no  Dissecting cellulitis of the scalp:  no  Pilonidal sinus:    no  Erythema nodosum:    no    Musculoskeletal:  A comprehensive musculoskeletal exam was NOT performed for all joints of each upper and lower extremity and assessed for swelling, tenderness and range of motion. Previous Exam    Tenderness of right trapezius muscles to rhomboid  Tenderness along lumbosacral spine     STEPHANIE: positive on left  Straight Leg Raise: positive on left  Modfied Scheober >5cm    Costochondritis:  no   Synovitis:   no  Dactylitis:   no  Enthesitis:   no    DATA REVIEW    Laboratory     Recent laboratory results were reviewed, summarized, and discussed with the patient. Imaging    Musculoskeletal Ultrasound    None    Radiographs    Chest 6/24/2020: Mild scoliosis of the thoracic spine noted. These reveal no acute fractures, dislocations or cardiopulmonary process noted. Left Knee 6/28/2017: The knee demonstrates anatomical alignment. The joint spaces are grossly preserved on this non-weightbearing study. No significant osteophyte formation is present. No acute fracture or suprapatellar joint effusion is identified. Pelvis 2/29/2016: no fracture or dislocation, lytic or blastic lesion of the pelvis. Scoliosis 3/16/2015: Convex right scoliosis T11-L3 measures 16 degrees. Only minimal rotational component. No vertebral body anomaly is seen. The left femoral head is 15 mm higher than the right compatible with a potentially significant leg length discrepancy. Risser stage V.    CT Imaging    CT Right Ankle without contrast 8/19/2019: Os navicularis measures 8 x 4 x 8 mm. A smooth, uniform soft tissue cleft is noted between this bone the remainder the navicular. Cortical margins are smooth. Bone island incidentally noted in the navicular. No acute fracture, coalition, concerning lytic or blastic lesion, degenerative change or alignment abnormality is evident.     CT Abdomen and Pelvis without contrast 4/16/2019: The lung bases are normal. The solid abdominal organs are normal within the limitations of a non-IV contrast exam. Neither kidney reveals evidence of definite calculi. The ureters are normal in caliber with no evidence of ureterolithiasis. The bowel is unopacified but normal in caliber. There is a normal appendix in the right lower quadrant. The uterus and ovaries are grossly normal. There is some trace fluid in the cul-de-sac. Bone windows are unremarkable. MR Imaging    MRI Lumbar Spine without contrast 5/14/2020: There is mild bone marrow edema within the L1 spinous process and to a lesser extent within the T12 spinous process. No interspinous soft tissue edema is identified. There is mild rightward curvature at the thoracolumbar junction. The vertebral body alignment is otherwise appropriate. The vertebral body heights are well-preserved. There is no pars defect. The overall bone marrow signal is within normal limits. There is no significant disc desiccation. The conus is identified at the L1 level. There is no abnormal signal within the distal spinal cord. At T12-L1, there is no significant spinal canal stenosis or neural foraminal narrowing. At L1-L2, there is no significant spinal canal stenosis or neural foraminal narrowing. At L2-L3, there is no significant spinal canal stenosis or neural foraminal narrowing. At L3-L4, there is no significant spinal canal stenosis. There is mild bilateral neural foraminal narrowing secondary to small foraminal disc bulges. At L4-L5, there is no significant spinal canal stenosis. There is mild bilateral neural foraminal narrowing secondary to small foraminal disc bulges. At L5-S1, there is no significant spinal canal stenosis or neural foraminal narrowing.     MRI Thoracic Spine without contrast 3/13/2020: Bone marrow: Bony vertebral elements have normal MR signal characteristics. Intervertebral disc spaces: The intervertebral disc spaces are normal for age. No herniated disc material is identified and the neuroforamina are patent. No canal stenosis. Cord: The cord is normal in contour and signal characteristics. Paraspinal soft tissues: Unremarkable. MRI Cervical Spine without contrast 1/24/2020: Alignment of the cervical spine is normal. Vertebral body height is well maintained. The craniocervical junction is normal. No abnormal signal is identified in the cervical or upper thoracic cord to indicate cord edema, myelomalacia or syrinx. C2/C3: Normal. C3/C4: Normal. C4/C5: Normal. C5/C6: Mild disc space narrowing is present with mild disc desiccation evident. There is no spinal stenosis or neural foraminal narrowing. C6/C7: Normal. C7/T1: Normal.    MRI Lumbar Spine without contrast 9/19/2018: Bone marrow: Normal MRI signal characteristics. Conus/cauda equina: The conus ends at L1 and is normal in contour and signal characteristics. No abnormalities identified along the cauda equina. Intervertebral discs: T12-L1: Normal for age. L1-L2: Normal for age. L2-L3: Normal for age. L3-L4: Normal for age. L4-L5: Normal for age. L5-S1: Normal for age. Sacrum is normal in appearance without evidence of fracture. Visualized portions of the sacroiliac joints are unremarkable. Paraspinal soft tissues: Unremarkable. MRI Left Knee without contrast 4/25/2018: The menisci and cruciate ligaments remain intact. The MCL and the lateral collateral ligaments complex are unremarkable. The extensor mechanism is within normal limits. The TT-TG offset measures 17 mm. The distal iliotibial band images normally. There is no knee joint effusion or Baker's cyst. There is no medial plica. No focal cartilage defect is identified. The bone marrow signal is within normal limits. The regional soft tissues image normally. DXA     None    Nuclear Medicine    Triple Phase Bone Scan 4/28/2016: No prior imaging is available for comparison. There is slight scoliosis dextroconvex upper lumbar spine. Planar imaging demonstrates no focal areas of abnormal bony activity. SPECT imaging demonstrates no focal areas of abnormal bony activity    EMG/NCS    EMG/NCS 2/22/2019: normal and symmetrical response in sural, superficial peroneal SNAP, normal left peroneal and tibial motor conduction studies. Needle examination showed no evidence of active denervation, foot muscles were not activated fully. ASSESSMENT AND PLAN    This is a follow-up visit for Ms. Leandra Lopez. 1) Ankylosing Spondylitis. (inflammatory back pain, spinous process edema T12-L1)     She has a chronic history of inflammatory back pain described as aching pain and stiffness worse at rest, such as in the morning or at night when laying in bed and improves with activity. She has stiffness lasting at least 2 hours in the morning and has recurrence after prolonged rest. She has not had any relief with NSAIDs (ibuprofen, diclofenac, or meloxicam). She has felt much better on Enbrel but is having injection site reactions, which could be due to medication or latex allergy. She developed the ISR after several of her doses, and the lesions are large but not painful, although her most recent ISR has bruised. She wants to continue Enbrel since she has felt much better with improvement of her inflammatory back pain and stiffness. I discussed changing her to Enbrel Mini or injection syringes but cautioned her that she may have similar reactions. She preferred the Mini, so I will change the SureClick to Mini. New order sent. 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 The patient remains on immunomodulatory medications (Enbrel) and requires frequent toxicity monitoring by blood work. 3) Musculoskeletal Neck Pain. This is likely form her poor posture and forward stooping neck posture. The etiology is usually from chronic slouching due to reading, smart phone use, video eyad, and/or computer work.  This will cause muscular tension and spasms, which may result with referred headaches. This is not an inflammatory process nor is it an immune mediated condition. It is mechanical and should be treated with targeted physical therapy with the goal of realigning the neck and shoulder girdle into the normal anatomic alignment. Muscle relaxants and analgesics should be used as supportive treatment. I recommend physical therapy. She has been referred physical therapy.    4) Right Rhomboid Muscle Pain. This is likely muscle spasm due to posture and scoliosis. 5) Ankle Swelling. This is likely venous stasis. The patient voiced understanding of the aforementioned assessment and plan. The patient has consented for synchronous (real-time) Telemedicine (audio technology) on 8/26/2020 for their care to be delivered over telemedicine in place of their regularly scheduled office visit pursuant to the emergency declaration under the [address] [place] [address], [facility] waiver authority and the [employer] Act, this Virtual  Visit was conducted, with patient's consent, to reduce the patient's risk of exposure to COVID-19 and provide continuity of care for an established patient. Today, I personally spent 11 minutes in direct patient care with the patient, of which greater than 50% of the time was spent in patient education, counseling, and coordination of care as described above. All questions asked and answered. Services were provided through an audio synchronous discussion virtually to substitute for in-person clinic visit.     TODAY'S ORDERS    Orders Placed This Encounter    etanercept (EnbreL Mini) 50 mg/mL (1 mL) crtg     Future Appointments   Date Time Provider Carlos Alberto Neyda   10/29/2020  1:00 PM Quintin Allen MD [facility] BS [facility]B     Kristy Kelsey MD, [address] [facility]    Adult Rheumatology   Rheumatology Ultrasound Certified  [facility]  [facility]  [address] [place] [address] Way, [place], [address]   Phone [phone]  Fax [phone]

## 2020-09-04 ENCOUNTER — DOCUMENTATION ONLY (OUTPATIENT)
Dept: RHEUMATOLOGY | Age: 25
End: 2020-09-04

## 2020-09-04 DIAGNOSIS — M45.6 ANKYLOSING SPONDYLITIS OF LUMBAR REGION (HCC): ICD-10-CM

## 2020-09-04 RX ORDER — ETANERCEPT 50 MG/ML
50 SOLUTION SUBCUTANEOUS
Qty: 4 CARTRIDGE | Refills: 11 | Status: SHIPPED | OUTPATIENT
Start: 2020-09-04 | End: 2021-06-28 | Stop reason: SDUPTHER

## 2020-10-05 ENCOUNTER — TELEPHONE (OUTPATIENT)
Dept: RHEUMATOLOGY | Age: 25
End: 2020-10-05

## 2020-10-29 ENCOUNTER — VIRTUAL VISIT (OUTPATIENT)
Dept: RHEUMATOLOGY | Age: 25
End: 2020-10-29
Payer: COMMERCIAL

## 2020-10-29 DIAGNOSIS — M45.6 ANKYLOSING SPONDYLITIS OF LUMBAR REGION (HCC): Primary | ICD-10-CM

## 2020-10-29 DIAGNOSIS — Z79.60 LONG-TERM USE OF IMMUNOSUPPRESSANT MEDICATION: ICD-10-CM

## 2020-10-29 DIAGNOSIS — T88.7XXA HYPERSENSITIVITY REACTION AT INJECTION SITE: ICD-10-CM

## 2020-10-29 DIAGNOSIS — T80.89XA HYPERSENSITIVITY REACTION AT INJECTION SITE: ICD-10-CM

## 2020-10-29 DIAGNOSIS — M41.20 SCOLIOSIS (AND KYPHOSCOLIOSIS), IDIOPATHIC: ICD-10-CM

## 2020-10-29 PROCEDURE — 99442 PR PHYS/QHP TELEPHONE EVALUATION 11-20 MIN: CPT | Performed by: INTERNAL MEDICINE

## 2020-10-29 RX ORDER — DIPHENHYDRAMINE HCL 25 MG
25 CAPSULE ORAL
Qty: 90 CAP | Refills: 1 | Status: SHIPPED | OUTPATIENT
Start: 2020-10-29 | End: 2020-11-08

## 2020-10-29 NOTE — PROGRESS NOTES
REASON FOR VISIT    This is a follow-up visit for Ms. James Miles for     ICD-10-CM   1. Ankylosing spondylitis of lumbar region [facility])  M45.6     The patient has consented for synchronous (real-time) Telemedicine (audio technology) on 10/29/2020 for their care to be delivered over telemedicine in place of their regularly scheduled office visit pursuant to the emergency declaration under the [address] [place] [address] [facility] authority and the [employer] Act, this Virtual  Visit was conducted, with patient's consent, to reduce the patient's risk of exposure to COVID-19 and provide continuity of care for an established patient. Services were provided through an audio synchronous discussion virtually to substitute for in-person clinic visit. Spondyloarthritis phenotype includes:  Anti-CCP positive: no  Rheumatoid factor positive: no  HLA-B27 positive: no  Inflammatory Back Pain: yes  Erosive disease: no  Sacroiliitis: no  Ankylosis: no  Psoriasis: no  Enthesitis: no  Dactylitis: no  Nail Pitting: no  Onycholysis: no  Splinter Hemorrhage: no  Extra-articular manifestations include: none  SAPHO: no    Immunosuppression Screening (6/26/2020):  Quantiferon TB: negative  PPD:  Not performed  Hepatitis B: negative  Hepatitis C: negative    Therapy History includes:  Current NSAIDs include: meloxicam 15 mg daily, ibuprofen 200 mg  Current DMARD therapy include: Enbrel Mini 50 mg every Saturday (9/05/2020 to present)  Prior DMARD therapy include: Humira 40 mg every 14 days (7/11/2020 to 7/28/2020), Enbrel 50 mg every Saturday (8/01/2020 to 8/26/2020)  Discontinued DMARDs because of inefficacy: None  Discontinued DMARDs because of side effects: Humira (nausea, vomiting, headache, injection site reaction), Enbrel SureClick (injection site reaction)    HISTORY OF PRESENT ILLNESS    Ms. James Miles returns for a follow-up visit.     On her last visit, due to injection site reactions on Enbrel SureClick, I changed her to [employer] per her request, despite her latex allergy per her request. She has been injecting with much less ISR and has been using oral Benadyrl and topical which relieves. Today, she feels well. She no longer has lower back pain or stiffness. She continues to complain of muscular upper back pain between her scapula. She is following with physical therapy who feels that her ROM is less and is concerned for curvature. She exercises daily. She denies fever, weight loss, blurred vision, vision loss, oral ulcers, ankle swelling, dry cough, dyspnea, nausea, vomiting, dysphagia, abdominal pain, black or bloody stool, fall since last visit, rash, easy bruising and increased thirst.     The patient has not had any interval hospital admissions, infections, or surgeries. REVIEW OF SYSTEMS    A comprehensive review of systems was performed and pertinent results are documented in the HPI, review of systems is otherwise non-contributory. PAST MEDICAL HISTORY    She has a past medical history of Broken foot (07/2019), Premature birth, Routine Papanicolaou smear (9/27/19 neg), and Scoliosis. FAMILY HISTORY    Her family history includes Breast Cancer in her paternal grandmother and another family member; Lupus in her sister; Neuropathy in her sister; No Known Problems in her father and mother; Other in her maternal grandfather and sister; Thyroid Disease in her sister. SOCIAL HISTORY    She reports that she has never smoked. She has never used smokeless tobacco. She reports that she does not drink alcohol or use drugs. IMMUNIZATIONS    There is no immunization history on file for this patient. MEDICATIONS    Current Outpatient Medications   Medication Sig Dispense Refill    diphenhydrAMINE (BenadryL) 25 mg capsule Take 1 Cap by mouth daily as needed for Skin Irritation for up to 10 days.  90 Cap 1    diphenhydrAMINE (BENADRYL) 2 % topical cream Apply  to affected area as needed for Skin Irritation. 30 g 3    etanercept (EnbreL Mini) 50 mg/mL (1 mL) crtg 50 mg by SubCUTAneous route every seven (7) days. Indications: rheumatoid arthritis (Patient taking differently: 50 mg by SubCUTAneous route Every Saturday. Indications: rheumatoid arthritis) 4 Cartridge 11    ethinyl estradiol-etonogestrel (NuvaRing) 0.12-0.015 mg/24 hr vaginal ring 1 Each by Intravaginal route every thirty (30) days. Insert 1 vaginal ring by vaginal route once a month. Leave in place for 3 weeks, remove for 1 week for 90 days. 3 Each 0    tiZANidine (ZANAFLEX) 2 mg capsule Take 2 mg by mouth three (3) times daily.  meloxicam (MOBIC) 15 mg tablet Take 15 mg by mouth as needed.  baclofen (LIORESAL) 10 mg tablet Take 5 mg by mouth as needed.  fexofenadine HCl (FEXOFENADINE PO) Take  by mouth daily.  acetaminophen (TYLENOL PO) Take  by mouth.  ibuprofen (MOTRIN) 200 mg tablet Take  by mouth every six (6) hours as needed for Pain. ALLERGIES    Allergies   Allergen Reactions    Latex Hives    Iodine Hives and Nausea and Vomiting    Pcn [Penicillins] Hives    Sulfa (Sulfonamide Antibiotics) Not Reported This Time       PHYSICAL EXAMINATION    There were no vitals taken for this visit. There is no height or weight on file to calculate BMI. General: Patient is alert, oriented x 3, not in acute distress    Chest:  Breathing comfortably at room air    Musculoskeletal:  A comprehensive musculoskeletal exam was NOT performed for all joints of each upper and lower extremity and assessed for swelling, tenderness and range of motion.       Previous Exam    Tenderness of right trapezius muscles to rhomboid  Tenderness along lumbosacral spine     STEPHANIE: positive on left  Straight Leg Raise: positive on left  Modfied Scheober >5cm    Costochondritis:  no   Synovitis:   no  Dactylitis:   no  Enthesitis:   no    DATA REVIEW    Laboratory     Recent laboratory results were reviewed, summarized, and discussed with the patient. Imaging    Musculoskeletal Ultrasound    None    Radiographs    Chest 6/24/2020: Mild scoliosis of the thoracic spine noted. These reveal no acute fractures, dislocations or cardiopulmonary process noted. Left Knee 6/28/2017: The knee demonstrates anatomical alignment. The joint spaces are grossly preserved on this non-weightbearing study. No significant osteophyte formation is present. No acute fracture or suprapatellar joint effusion is identified. Pelvis 2/29/2016: no fracture or dislocation, lytic or blastic lesion of the pelvis. Scoliosis 3/16/2015: Convex right scoliosis T11-L3 measures 16 degrees. Only minimal rotational component. No vertebral body anomaly is seen. The left femoral head is 15 mm higher than the right compatible with a potentially significant leg length discrepancy. Risser stage V.    CT Imaging    CT Right Ankle without contrast 8/19/2019: Os navicularis measures 8 x 4 x 8 mm. A smooth, uniform soft tissue cleft is noted between this bone the remainder the navicular. Cortical margins are smooth. Bone island incidentally noted in the navicular. No acute fracture, coalition, concerning lytic or blastic lesion, degenerative change or alignment abnormality is evident. CT Abdomen and Pelvis without contrast 4/16/2019: The lung bases are normal. The solid abdominal organs are normal within the limitations of a non-IV contrast exam. Neither kidney reveals evidence of definite calculi. The ureters are normal in caliber with no evidence of ureterolithiasis. The bowel is unopacified but normal in caliber. There is a normal appendix in the right lower quadrant. The uterus and ovaries are grossly normal. There is some trace fluid in the cul-de-sac. Bone windows are unremarkable. MR Imaging    MRI Lumbar Spine without contrast 5/14/2020:  There is mild bone marrow edema within the L1 spinous process and to a lesser extent within the T12 spinous process. No interspinous soft tissue edema is identified. There is mild rightward curvature at the thoracolumbar junction. The vertebral body alignment is otherwise appropriate. The vertebral body heights are well-preserved. There is no pars defect. The overall bone marrow signal is within normal limits. There is no significant disc desiccation. The conus is identified at the L1 level. There is no abnormal signal within the distal spinal cord. At T12-L1, there is no significant spinal canal stenosis or neural foraminal narrowing. At L1-L2, there is no significant spinal canal stenosis or neural foraminal narrowing. At L2-L3, there is no significant spinal canal stenosis or neural foraminal narrowing. At L3-L4, there is no significant spinal canal stenosis. There is mild bilateral neural foraminal narrowing secondary to small foraminal disc bulges. At L4-L5, there is no significant spinal canal stenosis. There is mild bilateral neural foraminal narrowing secondary to small foraminal disc bulges. At L5-S1, there is no significant spinal canal stenosis or neural foraminal narrowing.     MRI Thoracic Spine without contrast 3/13/2020: Bone marrow: Bony vertebral elements have normal MR signal characteristics. Intervertebral disc spaces: The intervertebral disc spaces are normal for age. No herniated disc material is identified and the neuroforamina are patent. No canal stenosis. Cord: The cord is normal in contour and signal characteristics. Paraspinal soft tissues: Unremarkable. MRI Cervical Spine without contrast 1/24/2020: Alignment of the cervical spine is normal. Vertebral body height is well maintained. The craniocervical junction is normal. No abnormal signal is identified in the cervical or upper thoracic cord to indicate cord edema, myelomalacia or syrinx. C2/C3: Normal. C3/C4: Normal. C4/C5: Normal. C5/C6: Mild disc space narrowing is present with mild disc desiccation evident. There is no spinal stenosis or neural foraminal narrowing. C6/C7: Normal. C7/T1: Normal.    MRI Lumbar Spine without contrast 9/19/2018: Bone marrow: Normal MRI signal characteristics. Conus/cauda equina: The conus ends at L1 and is normal in contour and signal characteristics. No abnormalities identified along the cauda equina. Intervertebral discs: T12-L1: Normal for age. L1-L2: Normal for age. L2-L3: Normal for age. L3-L4: Normal for age. L4-L5: Normal for age. L5-S1: Normal for age. Sacrum is normal in appearance without evidence of fracture. Visualized portions of the sacroiliac joints are unremarkable. Paraspinal soft tissues: Unremarkable. MRI Left Knee without contrast 4/25/2018: The menisci and cruciate ligaments remain intact. The MCL and the lateral collateral ligaments complex are unremarkable. The extensor mechanism is within normal limits. The TT-TG offset measures 17 mm. The distal iliotibial band images normally. There is no knee joint effusion or Baker's cyst. There is no medial plica. No focal cartilage defect is identified. The bone marrow signal is within normal limits. The regional soft tissues image normally. DXA     None    Nuclear Medicine    Triple Phase Bone Scan 4/28/2016: No prior imaging is available for comparison. There is slight scoliosis dextroconvex upper lumbar spine. Planar imaging demonstrates no focal areas of abnormal bony activity. SPECT imaging demonstrates no focal areas of abnormal bony activity    EMG/NCS    EMG/NCS 2/22/2019: normal and symmetrical response in sural, superficial peroneal SNAP, normal left peroneal and tibial motor conduction studies. Needle examination showed no evidence of active denervation, foot muscles were not activated fully. ASSESSMENT AND PLAN    This is a follow-up visit for Ms. Reynold Gao. 1) Ankylosing Spondylitis.  (inflammatory back pain, spinous process edema T12-L1)     She has a chronic history of inflammatory back pain described as aching pain and stiffness worse at rest, such as in the morning or at night when laying in bed and improves with activity. She has stiffness lasting at least 2 hours in the morning and has recurrence after prolonged rest. She has not had any relief with NSAIDs (ibuprofen, diclofenac, or meloxicam). She no longer has inflammatory back pain or stiffness. She still has active injection site reactions on Enbrel 50 mg Mini every Saturday but not as severe as the [address]. She is medicating with Benadryl 25 mg PO and topical Benadryl which helps, so asks for prescription.    2) Long Term Use of Immunosuppressants. The patient remains on immunomodulatory medications (Enbrel) and requires frequent toxicity monitoring by blood work. 3) Musculoskeletal Neck Pain. She has been working with physical therapy.    4) Right Rhomboid Muscle Pain, Scoliosis. This is likely muscle spasm due to posture and scoliosis. I ordered repeat spine per her request. If this shows progression, then she is to follow up with Dr. Jessica Dangelo. 5) Ankle Swelling. This is likely venous stasis. The patient voiced understanding of the aforementioned assessment and plan. The patient has consented for synchronous (real-time) Telemedicine (audio technology) on 10/29/2020 for their care to be delivered over telemedicine in place of their regularly scheduled office visit pursuant to the emergency declaration under the [facility] waiver authority and the [employer] Act, this Virtual  Visit was conducted, with patient's consent, to reduce the patient's risk of exposure to COVID-19 and provide continuity of care for an established patient. Today, I personally spent 12 minutes in direct patient care with the patient, of which greater than 50% of the time was spent in patient education, counseling, and coordination of care as described above.  All questions asked and answered. Services were provided through an audio synchronous discussion virtually to substitute for in-person clinic visit.     TODAY'S ORDERS    Orders Placed This Encounter    XR SPINE ENTIRE T-L , SKULL TO SACRUM 2 OR 3 VWS SCOLIOSIS    diphenhydrAMINE (BenadryL) 25 mg capsule    diphenhydrAMINE (BENADRYL) 2 % topical cream     Future Appointments   Date Time Provider Carlos Alberto Neyda   11/6/2020  1:30 PM Madison Ballesteros MD [address] Soto MD, [address] [facility]    Adult Rheumatology   Rheumatology Ultrasound Certified  [facility]  A Part of [facility], [address] [place] [address]   Phone [phone]  Fax [phone]

## 2020-11-06 ENCOUNTER — OFFICE VISIT (OUTPATIENT)
Dept: OBGYN CLINIC | Age: 25
End: 2020-11-06
Payer: COMMERCIAL

## 2020-11-06 ENCOUNTER — HOSPITAL ENCOUNTER (OUTPATIENT)
Dept: GENERAL RADIOLOGY | Age: 25
Discharge: HOME | End: 2020-11-06
Payer: COMMERCIAL

## 2020-11-06 DIAGNOSIS — Z01.419 WELL FEMALE EXAM WITH ROUTINE GYNECOLOGICAL EXAM: ICD-10-CM

## 2020-11-06 DIAGNOSIS — M41.20 SCOLIOSIS (AND KYPHOSCOLIOSIS), IDIOPATHIC: ICD-10-CM

## 2020-11-06 DIAGNOSIS — Z11.3 SCREENING FOR STD (SEXUALLY TRANSMITTED DISEASE): Primary | ICD-10-CM

## 2020-11-06 DIAGNOSIS — M45.6 ANKYLOSING SPONDYLITIS OF LUMBAR REGION (HCC): ICD-10-CM

## 2020-11-06 PROCEDURE — 99395 PREV VISIT EST AGE 18-39: CPT | Performed by: OBSTETRICS & GYNECOLOGY

## 2020-11-06 PROCEDURE — 72081 X-RAY EXAM ENTIRE SPI 1 VW: CPT

## 2020-11-06 RX ORDER — ETONOGESTREL AND ETHINYL ESTRADIOL 11.7; 2.7 MG/1; MG/1
1 INSERT, EXTENDED RELEASE VAGINAL
Qty: 3 EACH | Refills: 4 | Status: SHIPPED | OUTPATIENT
Start: 2020-11-06 | End: 2021-03-01

## 2020-11-06 NOTE — PROGRESS NOTES
[address] [facility]  [email or web address]  [phone]    Mirna Jay MD, [facility]       Annual Gynecologic Exam:  [facility] <40  Chief Complaint   Patient presents with    Well Woman         Paris Bergman is a 23 y.o.  WHITE OR  female who presents for an annual well woman exam.  No LMP recorded. .    With regard to the Gardisil vaccine, she has received all 3 injections. She does report additional concerns today, including increased painful cramping with the last 2-3 menstrual cycles. Menstrual status:  Her periods are moderate. She does report dysmenorrhea/painful menses. She does not report irregular bleeding. Sexual history and Contraception:  Social History     Substance and Sexual Activity   Sexual Activity Yes    Partners: Male    Birth control/protection: Condom     She never use condoms with sexual activity  She does not reports new sexual partner(s) in the last year. The patient does request STD testing. We recommended testing per CDC guidelines and at patient request.     Preventive Medicine History:  Her most recent Pap smear result: normal was obtained in 2019  Her most recent HR HPV screen was NOT obtained d/t age protocol  She does not have a history of PRESLEY 2, 3 or cervical cancer. Past Medical History:   Diagnosis Date    Broken foot 2019    right foot, no surgery-wore boot/cast for 6 weeks    Premature birth     at 29 weeks    Routine Papanicolaou smear 19 neg    Scoliosis      OB History    Para Term  AB Living   0 0 0 0 0 0   SAB TAB Ectopic Molar Multiple Live Births   0 0 0 0 0 0   Obstetric Comments   Menarche:  17. LMP: 20. # of Children:  0. Age at Delivery of First Child:  n/a. Hysterectomy/oophorectomy:  NO/NO. Breast Bx:  no.  Hx of Breast Feeding:  n/a.   BCP:  no. Hormone therapy:  no.      Past Surgical History:   Procedure Laterality Date    HX KNEE ARTHROSCOPY Right 2018 Family History   Problem Relation Age of Onset    No Known Problems Mother     No Known Problems Father     Lupus Sister     Other Sister         common variable immune deficiency    Neuropathy Sister     Other Maternal Grandfather         CFH    Breast Cancer Paternal Grandmother     Breast Cancer Other     Thyroid Disease Sister      Social History     Socioeconomic History    Marital status:      Spouse name: Not on file    Number of children: Not on file    Years of education: Not on file    Highest education level: Not on file   Occupational History     Comment: works at a [employer] resource strain: Not on file    Food insecurity     Worry: Not on file     Inability: Not on file   [employer] needs     Medical: Not on file     Non-medical: Not on file   Tobacco Use    Smoking status: Never Smoker    Smokeless tobacco: Never Used   Substance and Sexual Activity    Alcohol use: No    Drug use: No    Sexual activity: Yes     Partners: Male     Birth control/protection: Condom   Lifestyle    Physical activity     Days per week: Not on file     Minutes per session: Not on file    Stress: Not on file   Relationships    Social connections     Talks on phone: Not on file     Gets together: Not on file     Attends Anabaptism service: Not on file     Active member of club or organization: Not on file     Attends meetings of clubs or organizations: Not on file     Relationship status: Not on file    Intimate partner violence     Fear of current or ex partner: Not on file     Emotionally abused: Not on file     Physically abused: Not on file     Forced sexual activity: Not on file   Other Topics Concern    Not on file   Social History Narrative    Not on file       Allergies   Allergen Reactions    Latex Hives    Iodine Hives and Nausea and Vomiting    Pcn [Penicillins] Hives    Sulfa (Sulfonamide Antibiotics) Not Reported This Time       Current Outpatient Medications   Medication Sig    ethinyl estradiol-etonogestrel (NuvaRing) 0.12-0.015 mg/24 hr vaginal ring 1 Each by Intravaginal route every thirty (30) days. Insert 1 vaginal ring by vaginal route once a month. Leave in place for 3 weeks, remove for 1 week for 90 days.  diphenhydrAMINE (BenadryL) 25 mg capsule Take 1 Cap by mouth daily as needed for Skin Irritation for up to 10 days.  diphenhydrAMINE (BENADRYL) 2 % topical cream Apply  to affected area as needed for Skin Irritation.  etanercept (EnbreL Mini) 50 mg/mL (1 mL) crtg 50 mg by SubCUTAneous route every seven (7) days. Indications: rheumatoid arthritis (Patient taking differently: 50 mg by SubCUTAneous route Every Saturday. Indications: rheumatoid arthritis)    tiZANidine (ZANAFLEX) 2 mg capsule Take 2 mg by mouth three (3) times daily.  meloxicam (MOBIC) 15 mg tablet Take 15 mg by mouth as needed.  baclofen (LIORESAL) 10 mg tablet Take 5 mg by mouth as needed.  fexofenadine HCl (FEXOFENADINE PO) Take  by mouth daily.  ibuprofen (MOTRIN) 200 mg tablet Take  by mouth every six (6) hours as needed for Pain.  acetaminophen (TYLENOL PO) Take  by mouth. No current facility-administered medications for this visit. Patient Active Problem List   Diagnosis Code    [facility] M45.6    Long-term use of immunosuppressant medication Z79.899         Review of Systems - History obtained from the patient and patient filled out questionnaire   Constitutional/general, HEENT, CV, Resp, GI, MSK, Neuro, Psych, Heme/lymph, Skin, Breast ROS: no significant complaints except as noted on HPI    Physical Exam  There were no vitals taken for this visit.     Constitutional  · Appearance: well-nourished, well developed, alert, in no acute distress    HENT  · Head and Face: appears normal    Neck  · Inspection/Palpation: normal appearance, no masses or tenderness  · Lymph Nodes: no lymphadenopathy present  · Thyroid: gland size normal, nontender, no nodules or masses present on palpation    Chest  · Respiratory Effort: breathing unlabored  · Auscultation: normal breath sounds    Cardiovascular  · Heart:  · Auscultation: regular rate and rhythm without murmur    Breasts  · Inspection of Breasts: breasts symmetrical, no skin changes, no discharge present, nipple appearance normal, no skin retraction present  · Palpation of Breasts and Axillae: no masses present on palpation, no breast tenderness  · Axillary Lymph Nodes: no lymphadenopathy present    Gastrointestinal  · Abdominal Examination: abdomen non-tender to palpation, normal bowel sounds, no masses present  · Liver and spleen: no hepatomegaly present, spleen not palpable  · Hernias: no hernias identified    Genitourinary  · External Genitalia: normal appearance for age, no discharge present, no tenderness present, no inflammatory lesions present, no masses present  · Vagina: normal vaginal vault without central or paravaginal defects, no discharge present, no inflammatory lesions present, no masses present, ring in place  · Bladder: non-tender to palpation  · Urethra: appears normal  · Cervix: normal   · Uterus: normal size, shape and consistency  · Adnexa: no adnexal tenderness present, no adnexal masses present  · Perineum: perineum within normal limits, no evidence of trauma, no rashes or skin lesions present  · Anus: anus within normal limits, no hemorrhoids present  · Inguinal Lymph Nodes: no lymphadenopathy present    Skin  · General Inspection: no rash, no lesions identified    Neurologic/Psychiatric  · Mental Status:  · Orientation: grossly oriented to person, place and time  · Mood and Affect: mood normal, affect appropriate    Assessment:  23 y.o.  for well woman exam  Encounter Diagnoses   Name Primary?     Screening for STD (sexually transmitted disease) Yes    Well female exam with routine gynecological exam        Plan:  The patient was counseled about diet, exercise, healthy lifestyle  We discussed current pap smear and HR HPV testing guidelines. We recommend follow up one year for routine annual gynecologic exam or sooner prn  Handouts were given to the patient  We recommend follow up with a primary care physician for chronic medical problems and evaluation of non-gynecologic concerns and to please contact our office with any GYN questions or concerns. .Discussed risks, benefits and alternatives of OCP/nuvaring/patch: including but not limited to dvt/pe/mi/cva/ca/gi risks and that smoking, increasing age and other health conditions can increase these risks. Folllow up:  [x] return for annual well woman exam in one year or sooner if she is having problems  [] follow up and ultrasound  [] 6 months  [] 3 months  [] 6 weeks   [] 1 month    Orders Placed This Encounter    CHLAMYDIA/GC PCR    ethinyl estradiol-etonogestrel (NuvaRing) 0.12-0.015 mg/24 hr vaginal ring       No results found for any visits on 11/06/20.     f\

## 2020-11-09 NOTE — PROGRESS NOTES
The results were reviewed. Increase of scoiliosis from 16 to 22 degrees. Please discuss with Dr. Krys Morgan.

## 2020-11-11 LAB
C TRACH RRNA SPEC QL NAA+PROBE: NEGATIVE
N GONORRHOEA RRNA SPEC QL NAA+PROBE: NEGATIVE

## 2021-03-01 ENCOUNTER — TELEPHONE (OUTPATIENT)
Dept: OBGYN CLINIC | Age: 26
End: 2021-03-01

## 2021-03-01 DIAGNOSIS — Z01.419 WELL FEMALE EXAM WITH ROUTINE GYNECOLOGICAL EXAM: ICD-10-CM

## 2021-03-01 RX ORDER — ETONOGESTREL AND ETHINYL ESTRADIOL .12; .015 MG/D; MG/D
1 INSERT, EXTENDED RELEASE VAGINAL
Qty: 3 EACH | Refills: 2 | Status: SHIPPED | OUTPATIENT
Start: 2021-03-01 | End: 2021-10-14

## 2021-03-01 NOTE — TELEPHONE ENCOUNTER
rx has been sent to [facility] stating patient requests BRAND ONLY.   3 devices with 2 refills to get her though until her 11/11/21 appt

## 2021-03-01 NOTE — TELEPHONE ENCOUNTER
Patient of TP    Calling to ask for us to send her Nuvaring as BRAND ONLY to [facility] for her (she is now required to use mail order- confirmed fax number)

## 2021-04-28 ENCOUNTER — VIRTUAL VISIT (OUTPATIENT)
Dept: RHEUMATOLOGY | Age: 26
End: 2021-04-28
Payer: COMMERCIAL

## 2021-04-28 DIAGNOSIS — Z79.60 LONG-TERM USE OF IMMUNOSUPPRESSANT MEDICATION: ICD-10-CM

## 2021-04-28 DIAGNOSIS — M45.6 ANKYLOSING SPONDYLITIS OF LUMBAR REGION (HCC): Primary | ICD-10-CM

## 2021-04-28 PROCEDURE — 99443 PR PHYS/QHP TELEPHONE EVALUATION 21-30 MIN: CPT | Performed by: INTERNAL MEDICINE

## 2021-04-28 NOTE — PROGRESS NOTES
REASON FOR VISIT    This is a follow-up visit for Ms. Latasha Rojo for     ICD-10-CM   1. Ankylosing spondylitis of lumbar region [facility])  M45.6     The patient has consented for synchronous (real-time) Telemedicine (audio technology) on 4/28/2021 for their care to be delivered over telemedicine in place of their regularly scheduled office visit pursuant to the emergency declaration under the [address] [place] [address] [facility] authority and the [employer] Act, this Virtual  Visit was conducted, with patient's consent, to reduce the patient's risk of exposure to COVID-19 and provide continuity of care for an established patient. Services were provided through an audio synchronous discussion virtually to substitute for in-person clinic visit. Spondyloarthritis phenotype includes:  Anti-CCP positive: no  Rheumatoid factor positive: no  HLA-B27 positive: no  Inflammatory Back Pain: yes  Erosive disease: no  Sacroiliitis: no  Ankylosis: no  Psoriasis: no  Enthesitis: no  Dactylitis: no  Nail Pitting: no  Onycholysis: no  Splinter Hemorrhage: no  Extra-articular manifestations include: none  SAPHO: no    Immunosuppression Screening (6/26/2020):  Quantiferon TB: negative  PPD:  Not performed  Hepatitis B: negative  Hepatitis C: negative    Therapy History includes:  Current NSAIDs include: ibuprofen 200 mg  Current DMARD therapy include: Enbrel Mini 50 mg every Saturday (9/05/2020 to present)  Prior DMARD therapy include: Humira 40 mg every 14 days (7/11/2020 to 7/28/2020), Enbrel 50 mg every Saturday (8/01/2020 to 8/26/2020)  Discontinued DMARDs because of inefficacy: None  Discontinued DMARDs because of side effects: Humira (nausea, vomiting, headache, injection site reaction), Enbrel SureClick (injection site reaction)    HISTORY OF PRESENT ILLNESS    Ms. Latasha Rojo returns for a follow-up visit.     On her last visit, I continued Enbril Mini 50 mg weekly per her request, despite her latex allergy per her request. She did not have inflammatory back pain or stiffness. I ordered an entire spine radiograph due to scoliosis which showed a is 22 degree dextroconvex scoliosis T12-L3. No vertebral body anomalies are seen on AP projection. Iliac crests are relatively equal in height. I asked her to follow up with Dr. Maryam Mckeon. Today, she feels well. She has been having a lot muscle spasm pain in her back and was told by her physical therapist that she has sciatica and scoliosis and is working with her. She also was told her that her hip is tilted and that she had a leg length descrepancy as a cosnequence. She feels better with PT, but her insurance is limiting her visits for 10 and needs a referral. She is asking for muscle relaxant refill who were prescribed by Dr. Jessica Hickey. She does not feel better on ibuprofen. She had a little benefit on diclofenac. She saw Dr. Yovana Batista, who recommend pain management (office note reviewed). She has not followed with Dr. Maryam Mckeon. She has been having lower to middle of back pain and stiffness lasting up to 15 minutes that improves with activity but then has recurrence after work when she rests. She reports that for the past 3 months, she has been having sinus drainage and blowing out blood. She has been blowing her nose a lot and has been using antihistamines. She follows with an allergist who she will see tomorrow. She has not received her COVID vaccine but is concerned about it. The patient has not had any interval hospital admissions, infections, or surgeries. REVIEW OF SYSTEMS    A comprehensive review of systems was performed and pertinent results are documented in the HPI, review of systems is otherwise non-contributory.     PAST MEDICAL HISTORY    She has a past medical history of Broken foot (07/2019), Premature birth, Routine Papanicolaou smear (9/27/19 neg), and Scoliosis. FAMILY HISTORY    Her family history includes Breast Cancer in her paternal grandmother and another family member; Lupus in her sister; Neuropathy in her sister; No Known Problems in her father and mother; Other in her maternal grandfather and sister; Thyroid Disease in her sister. SOCIAL HISTORY    She reports that she has never smoked. She has never used smokeless tobacco. She reports that she does not drink alcohol or use drugs. MEDICATIONS    Current Outpatient Medications   Medication Sig    NuvaRing 0.12-0.015 mg/24 hr vaginal ring 1 Each by Intravaginal route every thirty (30) days. Insert 1 vaginal ring by vaginal route once a month. Leave in place for 3 weeks, remove for 1 week for 90 days. Patient requests BRAND ONLY    diphenhydrAMINE (BENADRYL) 2 % topical cream Apply  to affected area as needed for Skin Irritation.  etanercept (EnbreL Mini) 50 mg/mL (1 mL) crtg 50 mg by SubCUTAneous route every seven (7) days. Indications: rheumatoid arthritis (Patient taking differently: 50 mg by SubCUTAneous route Every Saturday. Indications: rheumatoid arthritis)    tiZANidine (ZANAFLEX) 2 mg capsule Take 2 mg by mouth three (3) times daily.  meloxicam (MOBIC) 15 mg tablet Take 15 mg by mouth as needed.  baclofen (LIORESAL) 10 mg tablet Take 5 mg by mouth as needed.  fexofenadine HCl (FEXOFENADINE PO) Take  by mouth daily.  acetaminophen (TYLENOL PO) Take  by mouth.  ibuprofen (MOTRIN) 200 mg tablet Take  by mouth every six (6) hours as needed for Pain. No current facility-administered medications for this visit. ALLERGIES    Allergies   Allergen Reactions    Latex Hives    Iodine Hives and Nausea and Vomiting    Pcn [Penicillins] Hives    Sulfa (Sulfonamide Antibiotics) Not Reported This Time       PHYSICAL EXAMINATION    There were no vitals taken for this visit. There is no height or weight on file to calculate BMI.     General: Patient is alert, oriented x 3, not in acute distress    Chest:  Breathing comfortably at room air    Musculoskeletal:  A comprehensive musculoskeletal exam was NOT performed for all joints of each upper and lower extremity and assessed for swelling, tenderness and range of motion. Previous Exam    Tenderness of right trapezius muscles to rhomboid  Tenderness along lumbosacral spine     STEPHANIE: positive on left  Straight Leg Raise: positive on left  Modfied Scheober >5cm    Costochondritis:  no   Synovitis:   no  Dactylitis:   no  Enthesitis:   no    DATA REVIEW    Laboratory     Recent laboratory results were reviewed, summarized, and discussed with the patient. Imaging    Musculoskeletal Ultrasound    None    Radiographs    Entire Spine 11/06/2020: There is 22 degree dextroconvex scoliosis T12-L3. No vertebral body anomalies are seen on AP projection. Iliac crests are relatively equal in height. Chest 6/24/2020: Mild scoliosis of the thoracic spine noted. These reveal no acute fractures, dislocations or cardiopulmonary process noted. Left Knee 6/28/2017: The knee demonstrates anatomical alignment. The joint spaces are grossly preserved on this non-weightbearing study. No significant osteophyte formation is present. No acute fracture or suprapatellar joint effusion is identified. Pelvis 2/29/2016: no fracture or dislocation, lytic or blastic lesion of the pelvis. Scoliosis 3/16/2015: Convex right scoliosis T11-L3 measures 16 degrees. Only minimal rotational component. No vertebral body anomaly is seen. The left femoral head is 15 mm higher than the right compatible with a potentially significant leg length discrepancy. Risser stage V.    CT Imaging    CT Right Ankle without contrast 8/19/2019: Os navicularis measures 8 x 4 x 8 mm. A smooth, uniform soft tissue cleft is noted between this bone the remainder the navicular. Cortical margins are smooth. Bone island incidentally noted in the navicular.  No acute fracture, coalition, concerning lytic or blastic lesion, degenerative change or alignment abnormality is evident. CT Abdomen and Pelvis without contrast 4/16/2019: The lung bases are normal. The solid abdominal organs are normal within the limitations of a non-IV contrast exam. Neither kidney reveals evidence of definite calculi. The ureters are normal in caliber with no evidence of ureterolithiasis. The bowel is unopacified but normal in caliber. There is a normal appendix in the right lower quadrant. The uterus and ovaries are grossly normal. There is some trace fluid in the cul-de-sac. Bone windows are unremarkable. MR Imaging    MRI Lumbar Spine without contrast 5/14/2020: There is mild bone marrow edema within the L1 spinous process and to a lesser extent within the T12 spinous process. No interspinous soft tissue edema is identified. There is mild rightward curvature at the thoracolumbar junction. The vertebral body alignment is otherwise appropriate. The vertebral body heights are well-preserved. There is no pars defect. The overall bone marrow signal is within normal limits. There is no significant disc desiccation. The conus is identified at the L1 level. There is no abnormal signal within the distal spinal cord. At T12-L1, there is no significant spinal canal stenosis or neural foraminal narrowing. At L1-L2, there is no significant spinal canal stenosis or neural foraminal narrowing. At L2-L3, there is no significant spinal canal stenosis or neural foraminal narrowing. At L3-L4, there is no significant spinal canal stenosis. There is mild bilateral neural foraminal narrowing secondary to small foraminal disc bulges. At L4-L5, there is no significant spinal canal stenosis. There is mild bilateral neural foraminal narrowing secondary to small foraminal disc bulges.  At L5-S1, there is no significant spinal canal stenosis or neural foraminal narrowing.     MRI Thoracic Spine without contrast 3/13/2020: Bone marrow: Bony vertebral elements have normal MR signal characteristics. Intervertebral disc spaces: The intervertebral disc spaces are normal for age. No herniated disc material is identified and the neuroforamina are patent. No canal stenosis. Cord: The cord is normal in contour and signal characteristics. Paraspinal soft tissues: Unremarkable. MRI Cervical Spine without contrast 1/24/2020: Alignment of the cervical spine is normal. Vertebral body height is well maintained. The craniocervical junction is normal. No abnormal signal is identified in the cervical or upper thoracic cord to indicate cord edema, myelomalacia or syrinx. C2/C3: Normal. C3/C4: Normal. C4/C5: Normal. C5/C6: Mild disc space narrowing is present with mild disc desiccation evident. There is no spinal stenosis or neural foraminal narrowing. C6/C7: Normal. C7/T1: Normal.    MRI Lumbar Spine without contrast 9/19/2018: Bone marrow: Normal MRI signal characteristics. Conus/cauda equina: The conus ends at L1 and is normal in contour and signal characteristics. No abnormalities identified along the cauda equina. Intervertebral discs: T12-L1: Normal for age. L1-L2: Normal for age. L2-L3: Normal for age. L3-L4: Normal for age. L4-L5: Normal for age. L5-S1: Normal for age. Sacrum is normal in appearance without evidence of fracture. Visualized portions of the sacroiliac joints are unremarkable. Paraspinal soft tissues: Unremarkable. MRI Left Knee without contrast 4/25/2018: The menisci and cruciate ligaments remain intact. The MCL and the lateral collateral ligaments complex are unremarkable. The extensor mechanism is within normal limits. The TT-TG offset measures 17 mm. The distal iliotibial band images normally. There is no knee joint effusion or Baker's cyst. There is no medial plica. No focal cartilage defect is identified. The bone marrow signal is within normal limits. The regional soft tissues image normally.      DXA     None    Nuclear Medicine    Triple Phase Bone Scan 4/28/2016: No prior imaging is available for comparison. There is slight scoliosis dextroconvex upper lumbar spine. Planar imaging demonstrates no focal areas of abnormal bony activity. SPECT imaging demonstrates no focal areas of abnormal bony activity    EMG/NCS    EMG/NCS 2/22/2019: normal and symmetrical response in sural, superficial peroneal SNAP, normal left peroneal and tibial motor conduction studies. Needle examination showed no evidence of active denervation, foot muscles were not activated fully. ASSESSMENT AND PLAN    This is a follow-up visit for Ms. Adria Sullivan. 1) Ankylosing Spondylitis. (inflammatory back pain, spinous process edema T12-L1)     She has a chronic history of inflammatory back pain described as aching pain and stiffness worse at rest, such as in the morning or at night when laying in bed and improves with activity. She had stiffness lasting at least 2 hours in the morning and had recurrence after prolonged rest. She had no relief with NSAIDs (ibuprofen, diclofenac, or meloxicam). There was here is mild bone marrow edema within the L1 spinous process and to a lesser extent within the T12 spinous process noted on her MRI in 5/14/2020, which raised the suspicion for ankylosing spondylitis. She had scoliosis which may also cause these complaints. She has been on Enbrel weekly since 8/2020 and she had felt better with resolution of her back pain and stiffness. However, today, she reports that continues to have mid to lower back pain and stiffness lasting up to 15 minutes in the morning. NSAIDs do not help. She also has scoliosis which is likely aggravating muscle back pain especially if she is having tilted pelvis. I explained that those are not inflammatory symptoms. She has no relief from NSAIDs and has been doing physical therapy which helps. She endorses that long car rides results with lower back pain and stiffness.  She denies breakthrough from Enbrel. I ordered a repeat MRI lumbosacral MRI to evaluate for active refractory ankylosing spondylitis. If her MRI does not show active AS, then this confirms her symptoms are MSK related to her scoliosis.    2) Long Term Use of Immunosuppressants. The patient remains on high risk immunomodulatory medications (Enbrel) and requires frequent toxicity monitoring by blood work to evaluate for toxicities. Respective labs were ordered (see below orders for details). 3) Musculoskeletal Neck Pain. She has been working with physical therapy.    4) Right Rhomboid Muscle Pain, Scoliosis. This likely aggravates muscle spasms due to posture and scoliosis. This is not AS. 5) Ankle Swelling. This is likely venous stasis. The patient voiced understanding of the aforementioned assessment and plan. The patient has consented for synchronous (real-time) Telemedicine (audio technology) on 4/28/2021 for their care to be delivered over telemedicine in place of their regularly scheduled office visit pursuant to the emergency declaration under the [facility] waiver authority and the [employer] Act, this Virtual  Visit was conducted, with patient's consent, to reduce the patient's risk of exposure to COVID-19 and provide continuity of care for an established patient. A total of 42 minutes was spent on this visit, reviewing interval notes, interval testing results, ordering tests, refilling medications, documenting the findings in the note, patient education, counseling, and coordination of care as described above. All questions asked and answered. Services were provided through an audio synchronous discussion virtually to substitute for in-person clinic visit.     TODAY'S ORDERS    Orders Placed This Encounter    QUANTIFERON-TB GOLD PLUS    MRI LUMB SPINE WO CONT    MRI PELV WO CONT    CHRONIC HEPATITIS PANEL Future Appointments   Date Time Provider Carlos Alberto Burrowsi   8/17/2021  3:20 PM Monica Mcfarland MD [facility]   11/11/2021  2:40 PM Berta Genao MD [address] Soto MD, [address] [facility]    Adult Rheumatology   Rheumatology Ultrasound Certified  [facility] Rain  A Part of [facility], [address] [place] [address] Road   Phone [phone]  Fax [phone]

## 2021-05-06 ENCOUNTER — HOSPITAL ENCOUNTER (OUTPATIENT)
Dept: MRI IMAGING | Age: 26
Discharge: HOME | End: 2021-05-06
Attending: INTERNAL MEDICINE
Payer: COMMERCIAL

## 2021-05-06 DIAGNOSIS — M45.6 ANKYLOSING SPONDYLITIS OF LUMBAR REGION (HCC): ICD-10-CM

## 2021-05-06 PROCEDURE — 72195 MRI PELVIS W/O DYE: CPT

## 2021-05-06 PROCEDURE — 72148 MRI LUMBAR SPINE W/O DYE: CPT

## 2021-05-10 LAB
COMMENT, 144067: NORMAL
GAMMA INTERFERON BACKGROUND BLD IA-ACNC: 0 IU/ML
HBV CORE AB SERPL QL IA: NEGATIVE
HBV CORE IGM SERPL QL IA: NEGATIVE
HBV E AB SERPL QL IA: NEGATIVE
HBV E AG SERPL QL IA: NEGATIVE
HBV SURFACE AB SER QL: NON REACTIVE
HBV SURFACE AG SERPL QL IA: NEGATIVE
HCV AB S/CO SERPL IA: 0.2 S/CO RATIO (ref 0–0.9)
M TB IFN-G BLD-IMP: NEGATIVE
M TB IFN-G CD4+ BCKGRND COR BLD-ACNC: 0 IU/ML
MITOGEN IGNF BLD-ACNC: >10 IU/ML
QUANTIFERON INCUBATION, QF1T: NORMAL
QUANTIFERON TB2 AG: 0.04 IU/ML
SERVICE CMNT-IMP: NORMAL

## 2021-05-28 ENCOUNTER — TELEPHONE (OUTPATIENT)
Dept: RHEUMATOLOGY | Age: 26
End: 2021-05-28

## 2021-05-28 NOTE — TELEPHONE ENCOUNTER
----- Message from Moses Patel sent at 5/28/2021  9:24 AM EDT -----  Regarding: Dr. Sydnie Soares first and last name and relationship (if not the patient): Self    Best contact number(s): [phone]    What are the symptoms: Hive like rash, with some itching, vomiting and nausea    Transfer successful - yes/no (include outcome): No, no answer    Transfer declined - yes/no (include reason): N/A    Was caller advised to seek appropriate level of care - yes/no: Yes, twice    Details to clarify the request: Pt started to notice a rash on 5/23/21, and it has since spread almost all over her body. She has had vomiting and nausea as well. Pt started new medication on 5/24/21. Pt has food and drug allergies such as: iodine, shellfish, sulpha, penicillin & latex. Pt stated that nothing else in environment has changed.

## 2021-05-28 NOTE — TELEPHONE ENCOUNTER
Spoke with pt who called stating that she was experiencing hive like rashes with some itching and nausea vomiting. She stated that she took her Enbrel injection last Saturday. She had some seafood on Sunday night and after that she developed nausea and vomiting and the rashes started. She has been taking benadryl and using hydrocortisone cream but nothing is helping. I advised her not to take her Enbrel injection this weekend and to wait until all her symptoms have subsided to take it again. [facility] to be evaluated for the rash/hives today to see if she can get something else that will help.

## 2021-06-28 DIAGNOSIS — M45.6 ANKYLOSING SPONDYLITIS OF LUMBAR REGION (HCC): ICD-10-CM

## 2021-06-29 RX ORDER — ETANERCEPT 50 MG/ML
50 SOLUTION SUBCUTANEOUS
Qty: 4 CARTRIDGE | Refills: 11 | Status: SHIPPED | OUTPATIENT
Start: 2021-07-03 | End: 2021-08-20 | Stop reason: SDUPTHER

## 2021-07-07 ENCOUNTER — DOCUMENTATION ONLY (OUTPATIENT)
Dept: RHEUMATOLOGY | Age: 26
End: 2021-07-07

## 2021-08-20 ENCOUNTER — OFFICE VISIT (OUTPATIENT)
Dept: RHEUMATOLOGY | Age: 26
End: 2021-08-20
Payer: COMMERCIAL

## 2021-08-20 VITALS
WEIGHT: 172 LBS | RESPIRATION RATE: 18 BRPM | SYSTOLIC BLOOD PRESSURE: 114 MMHG | DIASTOLIC BLOOD PRESSURE: 79 MMHG | BODY MASS INDEX: 30.47 KG/M2 | HEART RATE: 102 BPM | TEMPERATURE: 98.2 F

## 2021-08-20 DIAGNOSIS — R35.1 NOCTURIA: ICD-10-CM

## 2021-08-20 DIAGNOSIS — M45.6 ANKYLOSING SPONDYLITIS OF LUMBAR REGION (HCC): Primary | ICD-10-CM

## 2021-08-20 PROCEDURE — 99215 OFFICE O/P EST HI 40 MIN: CPT | Performed by: INTERNAL MEDICINE

## 2021-08-20 RX ORDER — LEVOCETIRIZINE DIHYDROCHLORIDE 5 MG/1
5 TABLET, FILM COATED ORAL DAILY
COMMUNITY

## 2021-08-20 RX ORDER — ETANERCEPT 50 MG/ML
50 SOLUTION SUBCUTANEOUS
Qty: 4 CARTRIDGE | Refills: 11 | Status: SHIPPED | OUTPATIENT
Start: 2021-08-21 | End: 2021-11-18

## 2021-08-20 RX ORDER — PIROXICAM 20 MG/1
20 CAPSULE ORAL
COMMUNITY
End: 2021-11-18

## 2021-08-20 RX ORDER — MONTELUKAST SODIUM 10 MG/1
10 TABLET ORAL DAILY
COMMUNITY

## 2021-08-20 NOTE — PROGRESS NOTES
REASON FOR VISIT    This is a follow-up visit for Ms. Lenin Rosenberg for     ICD-10-CM   1. Ankylosing spondylitis of lumbar region ([facility]  M45.6     Spondyloarthritis phenotype includes:  Anti-CCP positive: no  Rheumatoid factor positive: no  HLA-B27 positive: no  Inflammatory Back Pain: yes  Erosive disease: no  Sacroiliitis: no  Ankylosis: no  Psoriasis: no  Enthesitis: no  Dactylitis: no  Nail Pitting: no  Onycholysis: no  Splinter Hemorrhage: no  Extra-articular manifestations include: none  SAPHO: no    Immunosuppression Screening (5/07/2021): Quantiferon TB: negative  PPD:  Not performed  Hepatitis B: negative  Hepatitis C: negative    Therapy History includes:  Current NSAIDs include: ibuprofen 200 mg  Current DMARD therapy include: Enbrel Mini 50 mg every Saturday (9/05/2020 to present)  Prior DMARD therapy include: Humira 40 mg every 14 days (7/11/2020 to 7/28/2020), Enbrel 50 mg every Saturday (8/01/2020 to 8/26/2020)  Discontinued DMARDs because of inefficacy: None  Discontinued DMARDs because of side effects: Humira (nausea, vomiting, headache, injection site reaction), Enbrel SureClick (injection site reaction)    HISTORY OF PRESENT ILLNESS    Ms. Leinn Rosenberg returns for a follow-up visit. On her last visit, I explained that her symptoms were not inflammatory. I ordered a repeat MRI lumbosacral MRI to evaluate for active refractory ankylosing spondylitis, which did not show active AS, confirming her symptoms were MSK related to her scoliosis. I instructed her to stop Enbrel on 5/07/2021. She messaged me on 5/14/2021 reporting that her lower back pain and stiffness had increased. She was referred to pain management by Dr. Sarath Marshall. I asked her to resume Enbrel and let me know her response. Today, she feels pretty good. She was given peroxicam for TMJ and developed a drug-rash so was given prednisone which resolved it. She still has right TMJ.     She no longer has lower back pain or stiffness like before, and may last up to 10 minutes. She changed jobs and went from standing for several hours to sitting and if she notices a little pain in her tail bone. she notes neck pain with numbness and tingling down her right arms. She has been working with PT who has been doing dry needling and manual exercises. Two weeks ago, she felt something was off around her neck, where she felt nauseated and then vomiting. TENS and traction did not help. She still has pain between her shoulders. She used to get trigger point injections before. She has had unintended weight gain. She has been watching her intake. Her PCP checked her thyroid which was normal.     She also has nocturia around 5 times a night and does not know why. She does notice swelling in her legs at work. She endorses interval fall without fracture. She denies fever, weight loss, blurred vision, vision loss, oral ulcers, ankle swelling, dry cough, dyspnea, nausea, vomiting, dysphagia, abdominal pain, black or bloody stool, rash, easy bruising and increased thirst.    She has not received her COVID vaccine but is concerned about it. The patient has not had any interval hospital admissions, infections, or surgeries. REVIEW OF SYSTEMS    A comprehensive review of systems was performed and pertinent results are documented in the HPI, review of systems is otherwise non-contributory. PAST MEDICAL HISTORY    She has a past medical history of Broken foot (07/2019), Premature birth, Routine Papanicolaou smear (9/27/19 neg), and Scoliosis. FAMILY HISTORY    Her family history includes Breast Cancer in her paternal grandmother and another family member; Lupus in her sister; Neuropathy in her sister; No Known Problems in her father and mother; Other in her maternal grandfather and sister; Thyroid Disease in her sister. SOCIAL HISTORY    She reports that she has never smoked.  She has never used smokeless tobacco. She reports that she does not drink alcohol and does not use drugs. MEDICATIONS    Current Outpatient Medications   Medication Sig    levocetirizine (Xyzal) 5 mg tablet Take  by mouth daily.  montelukast (Singulair) 10 mg tablet Take 10 mg by mouth daily.  piroxicam (FELDENE) 20 mg capsule Take 20 mg by mouth daily.  [START ON 8/21/2021] etanercept (EnbreL Mini) 50 mg/mL (1 mL) crtg 50 mg by SubCUTAneous route Every Saturday. Indications: rheumatoid arthritis    NuvaRing 0.12-0.015 mg/24 hr vaginal ring 1 Each by Intravaginal route every thirty (30) days. Insert 1 vaginal ring by vaginal route once a month. Leave in place for 3 weeks, remove for 1 week for 90 days. Patient requests BRAND ONLY    diphenhydrAMINE (BENADRYL) 2 % topical cream Apply  to affected area as needed for Skin Irritation.  tiZANidine (ZANAFLEX) 2 mg capsule Take 2 mg by mouth three (3) times daily.  meloxicam (MOBIC) 15 mg tablet Take 15 mg by mouth as needed.  baclofen (LIORESAL) 10 mg tablet Take 5 mg by mouth as needed.  acetaminophen (TYLENOL PO) Take  by mouth.  ibuprofen (MOTRIN) 200 mg tablet Take  by mouth every six (6) hours as needed for Pain.  fexofenadine HCl (FEXOFENADINE PO) Take  by mouth daily. (Patient not taking: Reported on 8/20/2021)     No current facility-administered medications for this visit. ALLERGIES    Allergies   Allergen Reactions    Latex Hives    Iodine Hives and Nausea and Vomiting    Pcn [Penicillins] Hives    Sulfa (Sulfonamide Antibiotics) Not Reported This Time       PHYSICAL EXAMINATION    Visit Vitals  /79   Pulse (!) 102   Temp 98.2 °F (36.8 °C)   Resp 18   Wt 172 lb (78 kg)   BMI 30.47 kg/m²     Body mass index is 30.47 kg/m².     General: Patient is alert, oriented x 3, not in acute distress    Chest:  Breathing comfortably at room air    Musculoskeletal:  A comprehensive musculoskeletal exam was performed for all joints of each upper and lower extremity and assessed for swelling, tenderness and range of motion. Tenderness of right trapezius muscles to rhomboid  Tenderness along lumbosacral spine     STEPHANIE: positive on left  Straight Leg Raise: positive on left  Modfied Scheober >5cm    Costochondritis:  no   Synovitis:   no  Dactylitis:   no  Enthesitis:   no    DATA REVIEW    Laboratory     Recent laboratory results were reviewed, summarized, and discussed with the patient. Imaging    Musculoskeletal Ultrasound    None    Radiographs    Entire Spine 11/06/2020: There is 22 degree dextroconvex scoliosis T12-L3. No vertebral body anomalies are seen on AP projection. Iliac crests are relatively equal in height. Chest 6/24/2020: Mild scoliosis of the thoracic spine noted. These reveal no acute fractures, dislocations or cardiopulmonary process noted. Left Knee 6/28/2017: The knee demonstrates anatomical alignment. The joint spaces are grossly preserved on this non-weightbearing study. No significant osteophyte formation is present. No acute fracture or suprapatellar joint effusion is identified. Pelvis 2/29/2016: no fracture or dislocation, lytic or blastic lesion of the pelvis. Scoliosis 3/16/2015: Convex right scoliosis T11-L3 measures 16 degrees. Only minimal rotational component. No vertebral body anomaly is seen. The left femoral head is 15 mm higher than the right compatible with a potentially significant leg length discrepancy. Risser stage V.    CT Imaging    CT Right Ankle without contrast 8/19/2019: Os navicularis measures 8 x 4 x 8 mm. A smooth, uniform soft tissue cleft is noted between this bone the remainder the navicular. Cortical margins are smooth. Bone island incidentally noted in the navicular. No acute fracture, coalition, concerning lytic or blastic lesion, degenerative change or alignment abnormality is evident. CT Abdomen and Pelvis without contrast 4/16/2019:  The lung bases are normal. The solid abdominal organs are normal within the limitations of a non-IV contrast exam. Neither kidney reveals evidence of definite calculi. The ureters are normal in caliber with no evidence of ureterolithiasis. The bowel is unopacified but normal in caliber. There is a normal appendix in the right lower quadrant. The uterus and ovaries are grossly normal. There is some trace fluid in the cul-de-sac. Bone windows are unremarkable. MR Imaging    MRI Lumbar Spine without contrast 5/06/2021: No overt syndesmophyte formation to suggest ankylosing spondylitis. Bone marrow signal intensity is normal. Vertebral body heights are preserved. The visualized cord is normal in caliber and signal intensity. The conus medullaris terminates at the L1 level. The paraspinal soft tissues are normal. T12-L1: No herniation or stenosis on sagittal images. L1-L2: No herniation or stenosis. L2-L3: No herniation or stenosis. L3-L4: No herniation or stenosis. L4-L5: No herniation or stenosis. L5-S1: No herniation or stenosis.     MRI Pelvis without contrast 5/06/2021: Bone marrow: within normal limits. No fracture, dislocation, or marrow replacing process. No evidence of stress reaction or periostitis. Joint fluid: Bilateral hip joint fluid is physiologic. Tendons: Intact. Muscles: Within normal limits. Neurovascular bundles: Within normal limits. Articular cartilage: intact. No osteochondral lesion. No sacroiliitis. Soft tissue mass: None. No acute process in the pelvis. Vaginal ring is in good position around the cervix. Urinary bladder and uterus are within normal limits. No adnexal mass. No lymphadenopathy. MRI Lumbar Spine without contrast 5/14/2020: There is mild bone marrow edema within the L1 spinous process and to a lesser extent within the T12 spinous process. No interspinous soft tissue edema is identified. There is mild rightward curvature at the thoracolumbar junction. The vertebral body alignment is otherwise appropriate. The vertebral body heights are well-preserved. There is no pars defect.  The overall bone marrow signal is within normal limits. There is no significant disc desiccation. The conus is identified at the L1 level. There is no abnormal signal within the distal spinal cord. At T12-L1, there is no significant spinal canal stenosis or neural foraminal narrowing. At L1-L2, there is no significant spinal canal stenosis or neural foraminal narrowing. At L2-L3, there is no significant spinal canal stenosis or neural foraminal narrowing. At L3-L4, there is no significant spinal canal stenosis. There is mild bilateral neural foraminal narrowing secondary to small foraminal disc bulges. At L4-L5, there is no significant spinal canal stenosis. There is mild bilateral neural foraminal narrowing secondary to small foraminal disc bulges. At L5-S1, there is no significant spinal canal stenosis or neural foraminal narrowing.     MRI Thoracic Spine without contrast 3/13/2020: Bone marrow: Bony vertebral elements have normal MR signal characteristics. Intervertebral disc spaces: The intervertebral disc spaces are normal for age. No herniated disc material is identified and the neuroforamina are patent. No canal stenosis. Cord: The cord is normal in contour and signal characteristics. Paraspinal soft tissues: Unremarkable. MRI Cervical Spine without contrast 1/24/2020: Alignment of the cervical spine is normal. Vertebral body height is well maintained. The craniocervical junction is normal. No abnormal signal is identified in the cervical or upper thoracic cord to indicate cord edema, myelomalacia or syrinx. C2/C3: Normal. C3/C4: Normal. C4/C5: Normal. C5/C6: Mild disc space narrowing is present with mild disc desiccation evident. There is no spinal stenosis or neural foraminal narrowing. C6/C7: Normal. C7/T1: Normal.    MRI Lumbar Spine without contrast 9/19/2018: Bone marrow: Normal MRI signal characteristics. Conus/cauda equina: The conus ends at L1 and is normal in contour and signal characteristics.  No abnormalities identified along the cauda equina. Intervertebral discs: T12-L1: Normal for age. L1-L2: Normal for age. L2-L3: Normal for age. L3-L4: Normal for age. L4-L5: Normal for age. L5-S1: Normal for age. Sacrum is normal in appearance without evidence of fracture. Visualized portions of the sacroiliac joints are unremarkable. Paraspinal soft tissues: Unremarkable. MRI Left Knee without contrast 4/25/2018: The menisci and cruciate ligaments remain intact. The MCL and the lateral collateral ligaments complex are unremarkable. The extensor mechanism is within normal limits. The TT-TG offset measures 17 mm. The distal iliotibial band images normally. There is no knee joint effusion or Baker's cyst. There is no medial plica. No focal cartilage defect is identified. The bone marrow signal is within normal limits. The regional soft tissues image normally. DXA     None    Nuclear Medicine    Triple Phase Bone Scan 4/28/2016: No prior imaging is available for comparison. There is slight scoliosis dextroconvex upper lumbar spine. Planar imaging demonstrates no focal areas of abnormal bony activity. SPECT imaging demonstrates no focal areas of abnormal bony activity    EMG/NCS    EMG/NCS 2/22/2019: normal and symmetrical response in sural, superficial peroneal SNAP, normal left peroneal and tibial motor conduction studies. Needle examination showed no evidence of active denervation, foot muscles were not activated fully. ASSESSMENT AND PLAN    This is a follow-up visit for Ms. Leo Kern. 1) Ankylosing Spondylitis. (inflammatory back pain, spinous process edema T12-L1)     She has a chronic history of inflammatory back pain described as aching pain and stiffness worse at rest, such as in the morning or at night when laying in bed and improves with activity.  She had stiffness lasting at least 2 hours in the morning and had recurrence after prolonged rest. She had no relief with NSAIDs (ibuprofen, diclofenac, or meloxicam). There was here is mild bone marrow edema within the L1 spinous process and to a lesser extent within the T12 spinous process noted on her MRI in 5/14/2020, which raised the suspicion for ankylosing spondylitis. She had scoliosis which may also cause these complaints. She has been on Enbrel weekly since 8/2020 and she had felt better with resolution of her back pain and stiffness. However, she then reported on follow up that she continued to have mid to lower back pain and stiffness lasting up to 15 minutes in the morning. She endorsed that long car rides results with lower back pain and stiffness. NSAIDs did not help. She also had scoliosis which was likely aggravating muscle back pain especially that she was having a tilted pelvis. I explained that those symptoms were not inflammatory. I ordered a repeat MRI lumbosacral MRI to evaluate for active refractory ankylosing spondylitis, which did not show active AS, confirming her symptoms were MSK related to her scoliosis. I instructed her to stop Enbrel on 5/07/2021. She messaged me on 5/14/2021 reporting that her lower back pain and stiffness had increased. She was referred to pain management by Dr. Kathrin Leyden. I asked her to resume Enbrel and let me know her response. Today, she reports that her inflammatory back pain/stiffness has resolved. She has minor mechanical pain symptoms after prolonged rest that quickly abates. 2) Long Term Use of Immunosuppressants. The patient remains on high risk immunomodulatory medications (Enbrel) and requires frequent toxicity monitoring by blood work to evaluate for toxicities. Respective labs were ordered (see below orders for details). 3) Musculoskeletal Neck Pain. She has been working with physical therapy.    4) Right Rhomboid Muscle Pain, Scoliosis. This likely aggravates muscle spasms due to posture and scoliosis. This is not AS. She is doing PT and having dry needling. I discuss posture management.      5) Weight Gain. Her thyroid was normal. I asked her to count her calorie intake. I will check her HbA1c.     6) Nocturia. This is likely due to venous stasis at work now that she is mostly sitting. I asked her to wear compression hose at work. The patient voiced understanding of the aforementioned assessment and plan. A total of 42 minutes was spent on this visit, reviewing interval notes, interval testing results, ordering tests, refilling medications, documenting the findings in the note, patient education, counseling, and coordination of care as described above. All questions asked and answered.     TODAY'S ORDERS    Orders Placed This Encounter    HEMOGLOBIN A1C WITH EAG    etanercept (EnbreL Mini) 50 mg/mL (1 mL) crtg     Future Appointments   Date Time Provider Carlos Alberto Neyda   8/23/2021  9:00 AM Peng GALDAMEZ,  CAVSF BS AMB   11/10/2021  4:10 PM Julissa Ruby MD [facility]SR[facility] BS AMB   [id number]  4:76 PM Tracy Badillo MD [facility] BS AMB   2/2/2022  3:20 PM Roxann Lopez MD AOCR BS AMB     Flakita Marx MD, [facility]    Adult Rheumatology   Rheumatology Ultrasound Certified  [facility]  [facility], [address] [place] [address]   Phone [phone]  Fax [phone]

## 2021-08-23 ENCOUNTER — OFFICE VISIT (OUTPATIENT)
Dept: CARDIOLOGY CLINIC | Age: 26
End: 2021-08-23
Payer: COMMERCIAL

## 2021-08-23 VITALS
BODY MASS INDEX: 30.3 KG/M2 | WEIGHT: 171 LBS | HEART RATE: 100 BPM | SYSTOLIC BLOOD PRESSURE: 120 MMHG | HEIGHT: 63 IN | DIASTOLIC BLOOD PRESSURE: 82 MMHG

## 2021-08-23 DIAGNOSIS — M45.6 ANKYLOSING SPONDYLITIS OF LUMBAR REGION (HCC): ICD-10-CM

## 2021-08-23 DIAGNOSIS — R00.0 TACHYCARDIA: Primary | ICD-10-CM

## 2021-08-23 DIAGNOSIS — Z79.60 LONG-TERM USE OF IMMUNOSUPPRESSANT MEDICATION: ICD-10-CM

## 2021-08-23 DIAGNOSIS — G90.1 DYSAUTONOMIA (HCC): ICD-10-CM

## 2021-08-23 PROCEDURE — 99204 OFFICE O/P NEW MOD 45 MIN: CPT | Performed by: STUDENT IN AN ORGANIZED HEALTH CARE EDUCATION/TRAINING PROGRAM

## 2021-08-23 PROCEDURE — 93000 ELECTROCARDIOGRAM COMPLETE: CPT | Performed by: STUDENT IN AN ORGANIZED HEALTH CARE EDUCATION/TRAINING PROGRAM

## 2021-08-23 RX ORDER — TIZANIDINE HYDROCHLORIDE 4 MG/1
4 CAPSULE, GELATIN COATED ORAL
COMMUNITY

## 2021-08-23 NOTE — PROGRESS NOTES
[facility] [address], [address] [facility], [address] [place] [address]    Office [phone],NU [phone]           Mallorie Rodriguez is a 23 y.o. female presents the office for evaluation of tachycardia. Consult requested by Maria Victoria Esqueda DO  . Assessment/Recommendations:      ICD-10-CM ICD-9-CM    1. Tachycardia  R00.0 785.0 AMB POC EKG ROUTINE W/ 12 LEADS, INTER & REP   2. Ankylosing spondylitis of lumbar region ([facility] [address]  M45.6 720.0    3. Long-term use of immunosuppressant medication  Z79.899 V58.69    4. Dysautonomia ([facility]  G90.1 337.9        Sinus tachycardia with symptoms of mild orthostasis and dysautonomia. Potential mild form of POTS  History of syncope. Does have mild positional intolerance. -Recommend echocardiogram  -Aggressive salt and fluid intake during the day, especially in the heat  -Recommend compression stockings while at work. History of ankylosing spondylitis currently on immunosuppressive therapy    Family history of Yudith-Danlos in her sister. She has a history of hypermobility but currently does not have the diagnosis of EDS. Primary Care Physician- Maria Victoria Esqueda DO    Follow-up 6 months      []    High complexity decision making was performed  []    Patient is at high-risk of decompensation with multiple organ involvement      Subjective:  23 y.o. Mel Glance to the office for evaluation of tachycardia. Referred by Maria Victoria Esqueda DO. She has recently been wearing an apple watch and frequently noted to have tachycardia. She mentioned to her primary care physician recent evaluation referred for evaluation. TSH during that visit was 1.17. Patient has a history of ankylosing spondylitis. Her sisters also have a history of Yudith-Danlos syndrome along with dysautonomia. She reports frequent episodes of lightheadedness and dizziness, no history of syncope. Occasional palpitations.   No exertional dyspnea. No exertional chest pain symptoms. No strong family history of coronary artery disease. EKG in the office today shows sinus tachycardia with a heart rate of 100 bpm, otherwise normal electrocardiogram.      Past Medical History:   Diagnosis Date    Broken foot 07/2019    right foot, no surgery-wore boot/cast for 6 weeks    Premature birth     at 29 weeks    Routine Papanicolaou smear 9/27/19 neg    Scoliosis         Past Surgical History:   Procedure Laterality Date    HX KNEE ARTHROSCOPY Right 06/2018         Current Outpatient Medications:     tiZANidine (ZANAFLEX) 4 mg capsule, Take 4 mg by mouth nightly., Disp: , Rfl:     levocetirizine (Xyzal) 5 mg tablet, Take  by mouth daily. , Disp: , Rfl:     montelukast (Singulair) 10 mg tablet, Take 10 mg by mouth daily. , Disp: , Rfl:     piroxicam (FELDENE) 20 mg capsule, Take 20 mg by mouth daily. , Disp: , Rfl:     etanercept (EnbreL Mini) 50 mg/mL (1 mL) crtg, 50 mg by SubCUTAneous route Every Saturday. Indications: rheumatoid arthritis, Disp: 4 Cartridge, Rfl: 11    NuvaRing 0.12-0.015 mg/24 hr vaginal ring, 1 Each by Intravaginal route every thirty (30) days. Insert 1 vaginal ring by vaginal route once a month. Leave in place for 3 weeks, remove for 1 week for 90 days. Patient requests BRAND ONLY, Disp: 3 Each, Rfl: 2    diphenhydrAMINE (BENADRYL) 2 % topical cream, Apply  to affected area as needed for Skin Irritation. , Disp: 30 g, Rfl: 3    meloxicam (MOBIC) 15 mg tablet, Take 15 mg by mouth as needed. , Disp: , Rfl:     baclofen (LIORESAL) 10 mg tablet, Take 5 mg by mouth as needed. , Disp: , Rfl:     acetaminophen (TYLENOL PO), Take  by mouth., Disp: , Rfl:     ibuprofen (MOTRIN) 200 mg tablet, Take  by mouth every six (6) hours as needed for Pain., Disp: , Rfl:     Allergies   Allergen Reactions    Latex Hives    Shellfish Derived Anaphylaxis    Iodine Hives and Nausea and Vomiting    Pcn [Penicillins] Hives    Sulfa (Sulfonamide Antibiotics) Not Reported This Time        Family History   Problem Relation Age of Onset    No Known Problems Mother     No Known Problems Father     Lupus Sister     Other Sister         common variable immune deficiency    Neuropathy Sister     Other Maternal Grandfather         CFH    Breast Cancer Paternal Grandmother     Breast Cancer Other     Thyroid Disease Sister        Social History     Tobacco Use    Smoking status: Never Smoker    Smokeless tobacco: Never Used   Substance Use Topics    Alcohol use: No    Drug use: No       Review of Symptoms:  Pertinent Positive: Lightheadedness, dizziness, nocturnal polyuria  Pertinent Negative: Chest pain, shortness of breath, orthopnea, PND  All Other systems reviewed and are negative for a Comprehensive ROS (10+)    Physical Exam    Blood pressure 120/82, pulse 100, height 5' 3\" (1.6 m), weight 171 lb (77.6 kg). Constitutional:  well-developed and well-nourished. No distress. HENT: Normocephalic. Eyes: No scleral icterus. Neck:  Neck supple. No JVD present. Pulmonary/Chest: Effort normal and breath sounds normal. No respiratory distress, wheezes or rales. Cardiovascular: Normal rate, regular rhythm, S1 S2 . Exam reveals no gallop and no friction rub. No murmur heard. No edema. Extremities:  Normal muscle tone  Abdominal:   No abnormal distension. Neurological:  Moving all extremities, cranial nerves appear grossly intact. Skin: Skin is not cold. Not diaphoretic. No erythema. Psychiatric:  Grossly normal mood and affect. Intact insight. Objective Data:     Investigations personally reviewed and interpreted    EC2021normal sinus rhythm normal electrocardiogram          Investigations reviewed     Labs from primary care   TSH 1.17  White count 10.5, hemoglobin 14.8, platelets 856  Sodium 138, potassium 4.5, BUN 14, creatinine 0.78, ALT 23, AST 17        Jp Fair,           ATTENTION:   This medical record was transcribed using an electronic medical records/speech recognition system. Although proofread, it may and can contain electronic, spelling and other errors. Corrections may be executed at a later time. Please feel free to contact us for any clarifications as needed.

## 2021-08-23 NOTE — LETTER
8/23/2021    Patient: Baron De Santiago   YOB: 1995   Date of Visit: 8/23/2021     Ren Bauman, [address] [address] [address] [facility] [address]  Via Fax: [phone]    Dear Sridevi Olivo DO,      Thank you for referring Ms. Baron De Santiago to [address] for evaluation. My notes for this consultation are attached. If you have questions, please do not hesitate to call me. I look forward to following your patient along with you.       Sincerely,    Miguelina Gray, DO

## 2021-08-24 ENCOUNTER — TRANSCRIBE ORDER (OUTPATIENT)
Dept: SCHEDULING | Age: 26
End: 2021-08-24

## 2021-08-24 DIAGNOSIS — M76.70 PERONEAL TENDONITIS: ICD-10-CM

## 2021-08-24 DIAGNOSIS — S93.431A SPRAIN OF TIBIOFIBULAR LIGAMENT OF RIGHT ANKLE, INITIAL ENCOUNTER: ICD-10-CM

## 2021-08-24 DIAGNOSIS — M95.8 OSTEOCHONDRAL DEFECT OF ANKLE: ICD-10-CM

## 2021-08-24 DIAGNOSIS — S93.411A SPRAIN OF CALCANEOFIBULAR LIGAMENT OF RIGHT ANKLE, INITIAL ENCOUNTER: Primary | ICD-10-CM

## 2021-08-24 LAB
EST. AVERAGE GLUCOSE BLD GHB EST-MCNC: 108 MG/DL
HBA1C MFR BLD: 5.4 % (ref 4.8–5.6)

## 2021-08-31 ENCOUNTER — HOSPITAL ENCOUNTER (OUTPATIENT)
Dept: MRI IMAGING | Age: 26
Discharge: HOME | End: 2021-08-31
Attending: FAMILY MEDICINE
Payer: COMMERCIAL

## 2021-08-31 DIAGNOSIS — M76.70 PERONEAL TENDONITIS: ICD-10-CM

## 2021-08-31 DIAGNOSIS — S93.431A SPRAIN OF TIBIOFIBULAR LIGAMENT OF RIGHT ANKLE, INITIAL ENCOUNTER: ICD-10-CM

## 2021-08-31 DIAGNOSIS — M95.8 OSTEOCHONDRAL DEFECT OF ANKLE: ICD-10-CM

## 2021-08-31 DIAGNOSIS — S93.411A SPRAIN OF CALCANEOFIBULAR LIGAMENT OF RIGHT ANKLE, INITIAL ENCOUNTER: ICD-10-CM

## 2021-08-31 PROCEDURE — 73721 MRI JNT OF LWR EXTRE W/O DYE: CPT

## 2021-09-29 ENCOUNTER — ANCILLARY PROCEDURE (OUTPATIENT)
Dept: CARDIOLOGY CLINIC | Age: 26
End: 2021-09-29
Payer: COMMERCIAL

## 2021-09-29 VITALS
HEIGHT: 63 IN | DIASTOLIC BLOOD PRESSURE: 68 MMHG | SYSTOLIC BLOOD PRESSURE: 104 MMHG | WEIGHT: 170 LBS | BODY MASS INDEX: 30.12 KG/M2

## 2021-09-29 DIAGNOSIS — R00.0 TACHYCARDIA: ICD-10-CM

## 2021-09-29 PROCEDURE — 93306 TTE W/DOPPLER COMPLETE: CPT | Performed by: STUDENT IN AN ORGANIZED HEALTH CARE EDUCATION/TRAINING PROGRAM

## 2021-09-30 ENCOUNTER — TRANSCRIBE ORDER (OUTPATIENT)
Dept: SCHEDULING | Age: 26
End: 2021-09-30

## 2021-09-30 DIAGNOSIS — M54.12 BRACHIAL NEURITIS: Primary | ICD-10-CM

## 2021-09-30 DIAGNOSIS — M50.30 DDD (DEGENERATIVE DISC DISEASE), CERVICAL: Primary | ICD-10-CM

## 2021-10-01 LAB
ECHO AO ASC DIAM: 2.51 CM
ECHO AO ROOT DIAM: 2.62 CM
ECHO AV AREA PEAK VELOCITY: 2.43 CM2
ECHO AV AREA VTI: 2.66 CM2
ECHO AV AREA/BSA PEAK VELOCITY: 1.4 CM2/M2
ECHO AV AREA/BSA VTI: 1.5 CM2/M2
ECHO AV MEAN GRADIENT: 3.75 MMHG
ECHO AV PEAK GRADIENT: 6.99 MMHG
ECHO AV PEAK VELOCITY: 132.16 CM/S
ECHO AV VTI: 25.42 CM
ECHO EST RA PRESSURE: 3 MMHG
ECHO IVC PROX: 1.35 CM
ECHO LA AREA 4C: 17.23 CM2
ECHO LA MAJOR AXIS: 3.4 CM
ECHO LA MINOR AXIS: 1.89 CM
ECHO LA VOL 2C: 47.55 ML (ref 22–52)
ECHO LA VOL 4C: 44.04 ML (ref 22–52)
ECHO LA VOL BP: 50.5 ML (ref 22–52)
ECHO LA VOL/BSA BIPLANE: 28.06 ML/M2 (ref 16–28)
ECHO LA VOLUME INDEX A2C: 26.42 ML/M2 (ref 16–28)
ECHO LA VOLUME INDEX A4C: 24.47 ML/M2 (ref 16–28)
ECHO LV E' LATERAL VELOCITY: 10.92 CM/S
ECHO LV E' SEPTAL VELOCITY: 9.36 CM/S
ECHO LV INTERNAL DIMENSION DIASTOLIC: 3.93 CM (ref 3.9–5.3)
ECHO LV INTERNAL DIMENSION SYSTOLIC: 2.34 CM
ECHO LV IVSD: 0.68 CM (ref 0.6–0.9)
ECHO LV MASS 2D: 78.9 G (ref 67–162)
ECHO LV MASS INDEX 2D: 43.9 G/M2 (ref 43–95)
ECHO LV POSTERIOR WALL DIASTOLIC: 0.76 CM (ref 0.6–0.9)
ECHO LVOT DIAM: 1.88 CM
ECHO LVOT PEAK GRADIENT: 5.4 MMHG
ECHO LVOT PEAK VELOCITY: 116.17 CM/S
ECHO LVOT SV: 67.5 ML
ECHO LVOT VTI: 24.37 CM
ECHO MV A VELOCITY: 56.92 CM/S
ECHO MV E DECELERATION TIME (DT): 160.72 MS
ECHO MV E VELOCITY: 93.59 CM/S
ECHO MV E/A RATIO: 1.64
ECHO MV E/E' LATERAL: 8.57
ECHO MV E/E' RATIO (AVERAGED): 9.28
ECHO MV E/E' SEPTAL: 10
ECHO MV MAX VELOCITY: 98.05 CM/S
ECHO MV MEAN GRADIENT: 1.5 MMHG
ECHO MV PEAK GRADIENT: 3.85 MMHG
ECHO MV PRESSURE HALF TIME (PHT): 46.61 MS
ECHO MV VTI: 16.48 CM
ECHO RA AREA 4C: 11.87 CM2
ECHO RIGHT VENTRICULAR SYSTOLIC PRESSURE (RVSP): 22.09 MMHG
ECHO RV INTERNAL DIMENSION: 3.4 CM
ECHO RV TAPSE: 2.48 CM (ref 1.5–2)
ECHO TV REGURGITANT MAX VELOCITY: 218.48 CM/S
ECHO TV REGURGITANT PEAK GRADIENT: 19.09 MMHG

## 2021-10-04 ENCOUNTER — HOSPITAL ENCOUNTER (OUTPATIENT)
Dept: MRI IMAGING | Age: 26
Discharge: HOME | End: 2021-10-04
Attending: ORTHOPAEDIC SURGERY
Payer: COMMERCIAL

## 2021-10-04 DIAGNOSIS — M50.30 DDD (DEGENERATIVE DISC DISEASE), CERVICAL: ICD-10-CM

## 2021-10-04 PROCEDURE — 72141 MRI NECK SPINE W/O DYE: CPT

## 2021-10-05 ENCOUNTER — TELEPHONE (OUTPATIENT)
Dept: CARDIOLOGY CLINIC | Age: 26
End: 2021-10-05

## 2021-10-05 NOTE — LETTER
10/5/2021 4:27 PM    Ms. Dre Fisher  [facility] [address] [address]        To Whom It May Concern:    Dre Fisher is okay to proceed with surgery from a cardiac standpoint. If you have any further questions or concerns please feel free to call our office at [phone].         Sincerely,          Carol Quinonez, DO

## 2021-10-14 DIAGNOSIS — Z01.419 WELL FEMALE EXAM WITH ROUTINE GYNECOLOGICAL EXAM: ICD-10-CM

## 2021-10-14 RX ORDER — ETONOGESTREL AND ETHINYL ESTRADIOL .12; .015 MG/D; MG/D
INSERT, EXTENDED RELEASE VAGINAL
Qty: 3 RING | Refills: 0 | Status: SHIPPED | OUTPATIENT
Start: 2021-10-14 | End: 2021-11-10

## 2021-10-14 NOTE — TELEPHONE ENCOUNTER
27year old patient last seen in the office on  11/6/2020      Patient has upcoming appointment on   11/10/2021              Prescription sent as per MD order to patient preferred pharmacy to get patient to her scheduled appointment

## 2021-11-10 ENCOUNTER — OFFICE VISIT (OUTPATIENT)
Dept: OBGYN CLINIC | Age: 26
End: 2021-11-10
Payer: COMMERCIAL

## 2021-11-10 VITALS
WEIGHT: 178.2 LBS | SYSTOLIC BLOOD PRESSURE: 112 MMHG | HEART RATE: 103 BPM | DIASTOLIC BLOOD PRESSURE: 72 MMHG | BODY MASS INDEX: 31.57 KG/M2

## 2021-11-10 DIAGNOSIS — Z01.419 WELL FEMALE EXAM WITH ROUTINE GYNECOLOGICAL EXAM: Primary | ICD-10-CM

## 2021-11-10 DIAGNOSIS — Z12.4 CERVICAL CANCER SCREENING: ICD-10-CM

## 2021-11-10 DIAGNOSIS — J45.909 ASTHMA, UNSPECIFIED ASTHMA SEVERITY, UNSPECIFIED WHETHER COMPLICATED, UNSPECIFIED WHETHER PERSISTENT: ICD-10-CM

## 2021-11-10 DIAGNOSIS — M45.6 ANKYLOSING SPONDYLITIS OF LUMBAR REGION (HCC): ICD-10-CM

## 2021-11-10 DIAGNOSIS — Z11.3 SCREENING FOR STD (SEXUALLY TRANSMITTED DISEASE): ICD-10-CM

## 2021-11-10 DIAGNOSIS — M41.00 INFANTILE IDIOPATHIC SCOLIOSIS, UNSPECIFIED SPINAL REGION: ICD-10-CM

## 2021-11-10 DIAGNOSIS — Z30.09 FAMILY PLANNING COUNSELING: ICD-10-CM

## 2021-11-10 PROBLEM — M45.9 ANKYLOSING SPONDYLITIS (HCC): Status: ACTIVE | Noted: 2020-06-01

## 2021-11-10 PROCEDURE — 99395 PREV VISIT EST AGE 18-39: CPT | Performed by: OBSTETRICS & GYNECOLOGY

## 2021-11-10 RX ORDER — ONDANSETRON 8 MG/1
TABLET, ORALLY DISINTEGRATING ORAL
COMMUNITY
End: 2021-11-10

## 2021-11-10 RX ORDER — MINERAL OIL
ENEMA (ML) RECTAL
COMMUNITY
End: 2021-11-10

## 2021-11-10 RX ORDER — FLUTICASONE PROPIONATE 110 UG/1
AEROSOL, METERED RESPIRATORY (INHALATION)
COMMUNITY
End: 2021-11-10

## 2021-11-10 RX ORDER — DICLOFENAC SODIUM 75 MG/1
75 TABLET, DELAYED RELEASE ORAL
COMMUNITY
Start: 2021-07-12 | End: 2021-11-18

## 2021-11-10 RX ORDER — SUMATRIPTAN 50 MG/1
TABLET, FILM COATED ORAL
COMMUNITY
Start: 2021-07-30

## 2021-11-10 RX ORDER — ALBUTEROL SULFATE 90 UG/1
AEROSOL, METERED RESPIRATORY (INHALATION)
COMMUNITY
Start: 2021-09-09 | End: 2021-11-10

## 2021-11-10 NOTE — PATIENT INSTRUCTIONS
Well Visit, Ages 25 to 48: Care Instructions  Overview     Well visits can help you stay healthy. Your doctor has checked your overall health and may have suggested ways to take good care of yourself. Your doctor also may have recommended tests. At home, you can help prevent illness with healthy eating, regular exercise, and other steps. Follow-up care is a key part of your treatment and safety. Be sure to make and go to all appointments, and call your doctor if you are having problems. It's also a good idea to know your test results and keep a list of the medicines you take. How can you care for yourself at home? · Get screening tests that you and your doctor decide on. Screening helps find diseases before any symptoms appear. · Eat healthy foods. Choose fruits, vegetables, whole grains, protein, and low-fat dairy foods. Limit fat, especially saturated fat. Reduce salt in your diet. · Limit alcohol. If you are a man, have no more than 2 drinks a day or 14 drinks a week. If you are a woman, have no more than 1 drink a day or 7 drinks a week. · Get at least 30 minutes of physical activity on most days of the week. Walking is a good choice. You also may want to do other activities, such as running, swimming, cycling, or playing tennis or team sports. Discuss any changes in your exercise program with your doctor. · Reach and stay at a healthy weight. This will lower your risk for many problems, such as obesity, diabetes, heart disease, and high blood pressure. · Do not smoke or allow others to smoke around you. If you need help quitting, talk to your doctor about stop-smoking programs and medicines. These can increase your chances of quitting for good. · Care for your mental health. It is easy to get weighed down by worry and stress. Learn strategies to manage stress, like deep breathing and mindfulness, and stay connected with your family and community.  If you find you often feel sad or hopeless, talk with your doctor. Treatment can help. · Talk to your doctor about whether you have any risk factors for sexually transmitted infections (STIs). You can help prevent STIs if you wait to have sex with a new partner (or partners) until you've each been tested for STIs. It also helps if you use condoms (male or female condoms) and if you limit your sex partners to one person who only has sex with you. Vaccines are available for some STIs, such as HPV. · Use birth control if it's important to you to prevent pregnancy. Talk with your doctor about the choices available and what might be best for you. · If you think you may have a problem with alcohol or drug use, talk to your doctor. This includes prescription medicines (such as amphetamines and opioids) and illegal drugs (such as cocaine and methamphetamine). Your doctor can help you figure out what type of treatment is best for you. · Protect your skin from too much sun. When you're outdoors from 10 a.m. to 4 p.m., stay in the shade or cover up with clothing and a hat with a wide brim. Wear sunglasses that block UV rays. Even when it's cloudy, put broad-spectrum sunscreen (SPF 30 or higher) on any exposed skin. · See a dentist one or two times a year for checkups and to have your teeth cleaned. · Wear a seat belt in the car. When should you call for help? Watch closely for changes in your health, and be sure to contact your doctor if you have any problems or symptoms that concern you. Where can you learn more? Go to http://www.[employer].com/  Enter P072 in the search box to learn more about \"Well Visit, Ages 25 to 48: Care Instructions. \"  Current as of: February 11, 2021               Content Version: 13.0  © 7750-5717 Healthwise, Incorporated. Care instructions adapted under license by [employer] (which disclaims liability or warranty for this information).  If you have questions about a medical condition or this instruction, always ask your healthcare professional. [place] [address] any warranty or liability for your use of this information.

## 2021-11-10 NOTE — PROGRESS NOTES
[facility]  [email or web address]  [phone]    Kerline Parks MD, [address] [facility]       Annual Gynecologic Exam:  [facility]40  Chief Complaint   Patient presents with    Well Woman         Inés Barrientos is a 27 y.o.  WHITE/NON- female who presents for an annual well woman exam.  Patient's last menstrual period was 10/29/2021 (exact date). .    She reports the following additional concerns: Pt. Reports that she is getting surgery on . She stopped taking the nuvaring because after surgery her and her  are going to discuss having children. She also wants to know which medications she needs to stop in order to get pregnant. She reports having an autoimmune disease ankylosing spondylitis. She is also requesting to have the medications removed that she is not taking to have an updated medication list before surgery. Ankle surgery planned. Menstrual status:  She does report dysmenorrhea/painful menses since she stopped nuvaring. She does report heavy menses. She does not report irregular bleeding. Sexual history and Contraception:  Social History     Substance and Sexual Activity   Sexual Activity Yes    Partners: Male    Birth control/protection: Condom       She does not reports new sexual partner(s) in the last year. Preventive Medicine History:  Her most recent Pap smear result: normal was obtained in 2019  Her most recent HR HPV screen was not screened. She does not have a history of PRESLEY 2, 3 or cervical cancer.      Past Medical History:   Diagnosis Date    Ankylosing spondylitis ([address] 2020    Asthma     Broken foot 2019    right foot, no surgery-wore boot/cast for 6 weeks    Premature birth     at 29 weeks    Routine Papanicolaou smear 19 neg    Scoliosis     Tachycardia 2021     OB History    Para Term  AB Living   0 0 0 0 0 0   SAB IAB Ectopic Molar Multiple Live Births   0 0 0 0 0 0   Obstetric Comments Menarche:  17. LMP: 2/13/18. # of Children:  0. Age at Delivery of First Child:  n/a. Hysterectomy/oophorectomy:  NO/NO. Breast Bx:  no.  Hx of Breast Feeding:  n/a. BCP:  no. Hormone therapy:  no.      Past Surgical History:   Procedure Laterality Date    HX KNEE ARTHROSCOPY Right 06/2018     Family History   Problem Relation Age of Onset    No Known Problems Mother     No Known Problems Father     Lupus Sister     Other Sister         common variable immune deficiency    Neuropathy Sister     Other Maternal Grandfather         CFH    Breast Cancer Paternal Grandmother     Breast Cancer Other     Thyroid Disease Sister      Social History     Socioeconomic History    Marital status:      Spouse name: Not on file    Number of children: Not on file    Years of education: Not on file    Highest education level: Not on file   Occupational History     Comment: works at a [employer]   Tobacco Use    Smoking status: Never Smoker    Smokeless tobacco: Never Used   Substance and Sexual Activity    Alcohol use: No    Drug use: No    Sexual activity: Yes     Partners: Male     Birth control/protection: Condom   Other Topics Concern    Not on file   Social History Narrative    Not on file     Social Determinants of Health     Financial Resource Strain:     Difficulty of Paying Living Expenses: Not on file   Food Insecurity:     Worried About Running Out of Food in the Last Year: Not on file    Ela of Food in the Last Year: Not on file   Transportation Needs:     Lack of Transportation (Medical): Not on file    Lack of Transportation (Non-Medical):  Not on file   Physical Activity:     Days of Exercise per Week: Not on file    Minutes of Exercise per Session: Not on file   Stress:     Feeling of Stress : Not on file   Social Connections:     Frequency of Communication with Friends and Family: Not on file    Frequency of Social Gatherings with Friends and Family: Not on file   [facility] Attends Muslim Services: Not on file    Active Member of Clubs or Organizations: Not on file    Attends Club or Organization Meetings: Not on file    Marital Status: Not on file   Intimate Partner Violence:     Fear of Current or Ex-Partner: Not on file    Emotionally Abused: Not on file    Physically Abused: Not on file    Sexually Abused: Not on file   Housing Stability:     Unable to Pay for Housing in the Last Year: Not on file    Number of [place] in the Last Year: Not on file    Unstable Housing in the Last Year: Not on file       Allergies   Allergen Reactions    Latex Hives    Shellfish Derived Anaphylaxis    Iodine Hives and Nausea and Vomiting    Pcn [Penicillins] Hives    Sulfa (Sulfonamide Antibiotics) Not Reported This Time       Current Outpatient Medications   Medication Sig    diclofenac EC (VOLTAREN) 75 mg EC tablet Take 75 mg by mouth two (2) times a day.  SUMAtriptan (IMITREX) 50 mg tablet sumatriptan 50 mg tablet   1 TABLET AT LEAST 2 HOURS BETWEEN DOSES AS NEEDED ORALLY TWICE A DAY 30 DAYS    tiZANidine (ZANAFLEX) 4 mg capsule Take 4 mg by mouth nightly.  levocetirizine (Xyzal) 5 mg tablet Take  by mouth daily.  montelukast (Singulair) 10 mg tablet Take 10 mg by mouth daily.  piroxicam (FELDENE) 20 mg capsule Take 20 mg by mouth daily.  etanercept (EnbreL Mini) 50 mg/mL (1 mL) crtg 50 mg by SubCUTAneous route Every Saturday. Indications: rheumatoid arthritis    diphenhydrAMINE (BENADRYL) 2 % topical cream Apply  to affected area as needed for Skin Irritation.  meloxicam (MOBIC) 15 mg tablet Take 15 mg by mouth as needed.  baclofen (LIORESAL) 10 mg tablet Take 5 mg by mouth as needed.  acetaminophen (TYLENOL PO) Take  by mouth.  ibuprofen (MOTRIN) 200 mg tablet Take  by mouth every six (6) hours as needed for Pain. No current facility-administered medications for this visit.        Patient Active Problem List   Diagnosis Code    Ankylosing spondylitis of lumbar region ([facility] M45.6    Long-term use of immunosuppressant medication Z79.899    Ankylosing spondylitis ([facility] M45.9    Scoliosis M41.9    Asthma J45.909         Review of Systems - History obtained from the patient and patient filled out questionnaire   Constitutional/general, HEENT, CV, Resp, GI, MSK, Neuro, Psych, Heme/lymph, Skin, Breast ROS: no significant complaints except as noted on HPI    Physical Exam  Visit Vitals  /72   Pulse (!) 103   Wt 178 lb 3.2 oz (80.8 kg)   LMP 10/29/2021 (Exact Date)   BMI 31.57 kg/m²       Constitutional  · Appearance: well-nourished, well developed, alert, in no acute distress    HENT  · Head and Face: appears normal    Neck  · Inspection/Palpation: normal appearance, no masses or tenderness  · Lymph Nodes: no lymphadenopathy present  · Thyroid: gland size normal, nontender, no nodules or masses present on palpation    Chest  · Respiratory Effort: breathing unlabored  · Auscultation: normal breath sounds    Cardiovascular  · Heart:  · Auscultation: regular rate and rhythm without murmur    Breasts  · Inspection of Breasts: breasts symmetrical, no skin changes, no discharge present, nipple appearance normal, no skin retraction present  · Palpation of Breasts and Axillae: no masses present on palpation, no breast tenderness  · Axillary Lymph Nodes: no lymphadenopathy present    Gastrointestinal  · Abdominal Examination: abdomen non-tender to palpation, normal bowel sounds, no masses present  · Liver and spleen: no hepatomegaly present, spleen not palpable  · Hernias: no hernias identified    Genitourinary  · External Genitalia: normal appearance for age, no discharge present, no tenderness present, no inflammatory lesions present, no masses present  · Vagina: normal vaginal vault without central or paravaginal defects, brown discharge present, no inflammatory lesions present, no masses present  · Bladder: non-tender to palpation  · Urethra: appears normal  · Cervix: normal   · Uterus: normal size, shape and consistency  · Adnexa: no adnexal tenderness present, no adnexal masses present  · Perineum: perineum within normal limits, no evidence of trauma, no rashes or skin lesions present  · Anus: anus within normal limits, no hemorrhoids present  · Inguinal Lymph Nodes: no lymphadenopathy present    Skin  · General Inspection: no rash, no lesions identified    Neurologic/Psychiatric  · Mental Status:  · Orientation: grossly oriented to person, place and time  · Mood and Affect: mood normal, affect appropriate    Assessment:  27 y.o.  for well woman exam  Encounter Diagnoses   Name Primary?  Well female exam with routine gynecological exam Yes    Screening for STD (sexually transmitted disease)     Cervical cancer screening     Family planning counseling     Ankylosing spondylitis of lumbar region ([facility]     Infantile idiopathic scoliosis, unspecified spinal region     Asthma, unspecified asthma severity, unspecified whether complicated, unspecified whether persistent        Plan:  The patient was counseled about diet, exercise, healthy lifestyle  We discussed current pap smear and HR HPV testing guidelines. I recommended follow up one year for routine annual gynecologic exam or sooner prn  Handouts were given to the patient  I recommended follow up with a primary care physician for chronic medical problems and evaluation of non-gynecologic concerns and to please contact our office with any GYN questions or concerns. I recommended testing per CDC guidelines and at patient request.   Disc safer/preferred medication in pregnancy, because patient may need to continue medications with pregnancy risks will refer for preconception MFM consult  We discussed safer NSAID dosing for heavy cycles/cramping. Pt was advised to take this dose with food and not to take for more than 5 days. Disc RBA including bleeding/gastric irritation.    CHRISTINA POWELL if NI to discuss other options. We discussed timed intercourse, menstrual charting, and s/sx of ovulation. I recommended a daily prenatal vitamin. We discussed that if conception does not occur within one year then additional evaluation may be indicated. Folllow up:  [x] return for annual well woman exam in one year or sooner if she is having problems  [] follow up and ultrasound  [] 6 months  [] 3 months  [] 6 weeks   [] 1 month    Orders Placed This Encounter    REFERRAL TO PERINATOLOGY    PAP IG, CT-NG-TV, RFX APTIMA HPV ASCUS ([id number])       No results found for any visits on 11/10/21.

## 2021-11-14 LAB
C TRACH RRNA CVX QL NAA+PROBE: NEGATIVE
CYTOLOGIST CVX/VAG CYTO: NORMAL
CYTOLOGY CVX/VAG DOC CYTO: NORMAL
CYTOLOGY CVX/VAG DOC THIN PREP: NORMAL
DX ICD CODE: NORMAL
LABCORP, 190119: NORMAL
Lab: NORMAL
N GONORRHOEA RRNA CVX QL NAA+PROBE: NEGATIVE
OTHER STN SPEC: NORMAL
STAT OF ADQ CVX/VAG CYTO-IMP: NORMAL
T VAGINALIS RRNA SPEC QL NAA+PROBE: NEGATIVE

## 2021-11-15 ENCOUNTER — HOSPITAL ENCOUNTER (OUTPATIENT)
Dept: PREADMISSION TESTING | Age: 26
Discharge: HOME | End: 2021-11-15
Payer: COMMERCIAL

## 2021-11-15 VITALS
OXYGEN SATURATION: 100 % | SYSTOLIC BLOOD PRESSURE: 121 MMHG | WEIGHT: 177.91 LBS | DIASTOLIC BLOOD PRESSURE: 79 MMHG | HEART RATE: 109 BPM | TEMPERATURE: 98.4 F | RESPIRATION RATE: 24 BRPM | BODY MASS INDEX: 30.37 KG/M2 | HEIGHT: 64 IN

## 2021-11-15 LAB
ANION GAP SERPL CALC-SCNC: 7 MMOL/L (ref 5–15)
BASOPHILS # BLD: 0.1 K/UL (ref 0–0.1)
BASOPHILS NFR BLD: 1 % (ref 0–1)
BUN SERPL-MCNC: 14 MG/DL (ref 6–20)
BUN/CREAT SERPL: 17 (ref 12–20)
CALCIUM SERPL-MCNC: 9.4 MG/DL (ref 8.5–10.1)
CHLORIDE SERPL-SCNC: 108 MMOL/L (ref 97–108)
CO2 SERPL-SCNC: 24 MMOL/L (ref 21–32)
CREAT SERPL-MCNC: 0.84 MG/DL (ref 0.55–1.02)
DIFFERENTIAL METHOD BLD: ABNORMAL
EOSINOPHIL # BLD: 0.2 K/UL (ref 0–0.4)
EOSINOPHIL NFR BLD: 2 % (ref 0–7)
ERYTHROCYTE [DISTWIDTH] IN BLOOD BY AUTOMATED COUNT: 11.8 % (ref 11.5–14.5)
GLUCOSE SERPL-MCNC: 103 MG/DL (ref 65–100)
HCT VFR BLD AUTO: 44.1 % (ref 35–47)
HGB BLD-MCNC: 15.2 G/DL (ref 11.5–16)
IMM GRANULOCYTES # BLD AUTO: 0.1 K/UL (ref 0–0.04)
IMM GRANULOCYTES NFR BLD AUTO: 1 % (ref 0–0.5)
INR PPP: 1 (ref 0.9–1.1)
LYMPHOCYTES # BLD: 2.4 K/UL (ref 0.8–3.5)
LYMPHOCYTES NFR BLD: 24 % (ref 12–49)
MCH RBC QN AUTO: 32.5 PG (ref 26–34)
MCHC RBC AUTO-ENTMCNC: 34.5 G/DL (ref 30–36.5)
MCV RBC AUTO: 94.2 FL (ref 80–99)
MONOCYTES # BLD: 0.7 K/UL (ref 0–1)
MONOCYTES NFR BLD: 7 % (ref 5–13)
NEUTS SEG # BLD: 6.6 K/UL (ref 1.8–8)
NEUTS SEG NFR BLD: 65 % (ref 32–75)
NRBC # BLD: 0 K/UL (ref 0–0.01)
NRBC BLD-RTO: 0 PER 100 WBC
PLATELET # BLD AUTO: 370 K/UL (ref 150–400)
PMV BLD AUTO: 10.2 FL (ref 8.9–12.9)
POTASSIUM SERPL-SCNC: 4.8 MMOL/L (ref 3.5–5.1)
PROTHROMBIN TIME: 10.5 SEC (ref 9–11.1)
RBC # BLD AUTO: 4.68 M/UL (ref 3.8–5.2)
SODIUM SERPL-SCNC: 139 MMOL/L (ref 136–145)
WBC # BLD AUTO: 10 K/UL (ref 3.6–11)

## 2021-11-15 PROCEDURE — 80048 BASIC METABOLIC PNL TOTAL CA: CPT

## 2021-11-15 PROCEDURE — 85610 PROTHROMBIN TIME: CPT

## 2021-11-15 PROCEDURE — U0003 INFECTIOUS AGENT DETECTION BY NUCLEIC ACID (DNA OR RNA); SEVERE ACUTE RESPIRATORY SYNDROME CORONAVIRUS 2 (SARS-COV-2) (CORONAVIRUS DISEASE [COVID-19]), AMPLIFIED PROBE TECHNIQUE, MAKING USE OF HIGH THROUGHPUT TECHNOLOGIES AS DESCRIBED BY CMS-2020-01-R: HCPCS

## 2021-11-15 PROCEDURE — 36415 COLL VENOUS BLD VENIPUNCTURE: CPT

## 2021-11-15 PROCEDURE — 85025 COMPLETE CBC W/AUTO DIFF WBC: CPT

## 2021-11-15 RX ORDER — ALBUTEROL SULFATE 90 UG/1
2 AEROSOL, METERED RESPIRATORY (INHALATION)
COMMUNITY

## 2021-11-15 RX ORDER — ACETAMINOPHEN 500 MG
1000 TABLET ORAL
COMMUNITY
End: 2021-11-18

## 2021-11-15 NOTE — PROGRESS NOTES
Normal pap smear, message sent if [address] active. Update PMH/HM: include: Date of pap, Cytology: wnl. For HR HPV results: list NEG or POS, when done.

## 2021-11-15 NOTE — PERIOP NOTES
[address], [address] [facility]   MAIN OR                                  [phone]   MAIN PRE OP                          [phone]                                                                                AMBULATORY PRE OP          [phone]  PRE-ADMISSION TESTING    [phone]   Surgery Date:  Thursday November 18       Is surgery arrival time given by surgeon? NO  If NO, [address] [facility] staff will call you between 3 and 7pm the day before your surgery with your arrival time. (If your surgery is on a Monday, we will call you the Friday before.)    Call [phone] after 7pm Monday-Friday if you did not receive this call.     INSTRUCTIONS BEFORE YOUR SURGERY   When You  Arrive Arrive at the [address] [facility] on the day of your surgery  Have your insurance card, photo ID, and any copayment (if needed)   Food   and   Drink NO food or drink after midnight the night before surgery    This means NO water, gum, mints, coffee, juice, etc.  No alcohol (beer, wine, liquor) 24 hours before and after surgery   Medications to   TAKE   Morning of Surgery MEDICATIONS TO TAKE THE MORNING OF SURGERY WITH A SIP OF WATER:   Proair if needed (bring on day of surgery)    Enbrel as directed by Dr. Aron Mckenzie   Medications  To  STOP      7 days before surgery  Non-Steroidal anti-inflammatory Drugs (NSAID's): for example, Ibuprofen (Advil, Motrin), Naproxen (Aleve) Diclofenac, Meloxicam, Piroxicam   Aspirin, if taking for pain    Herbal supplements, vitamins, and fish oil   Other:  (Pain medications not listed above, including Tylenol may be taken)   Blood  Thinners     Bathing Clothing  Jewelry  Valuables      If you shower the morning of surgery, please do not apply anything to your skin (lotions, powders, deodorant, or makeup, especially mascara)   Follow Chlorhexidine Care Fusion body wash instructions provided to you during PAT appointment. Begin 3 days prior to surgery.  Do not shave or trim anywhere 24 hours before surgery   Wear your hair loose or down; no pony-tails, buns, or metal hair clips   Wear loose, comfortable, clean clothes   Wear glasses instead of contacts  [employer] money, valuables, and jewelry, including body piercings, at home  [facility] - or Spending the Night  SAME-DAY SURGERY: You must have a responsible adult drive you home and stay with you 24 hours after surgery   ADMITS: If your doctor is keeping you in the hospital after surgery, leave personal belongings/luggage in your car until you have a hospital room number. Hospital discharge time is 12 noon  Drivers must be here before 12 noon unless you are told differently   Special Instructions It is now mandated that all surgical patients be tested for COVID-19 prior to surgery. Testing has to be exactly 4 days prior to surgery. Patients are advised to self-quarantine at home after testing and prior to your surgery date. You will be notified if your results are positive. What to watch for:   Coronavirus (COVID-19) affects different people in different ways   It also appears with a wide range of symptoms from mild to severe   Signs usually appear 2-14 days after exposure     If you develop any of the following, notify your doctor immediately:  o Fever  o Chills, with or without a shiver  o Muscle pain  o Headache  o Sore throat  o Dry cough  o New loss of taste or smell  o Tiredness      If you develop any of the following, call 911:  o Shortness of breath  o Difficulty breathing  o Chest pain  o New confusion  o Blueness of fingers and/or lips       Follow all instructions so your surgery wont be cancelled. Please, be on time. If a situation occurs and you are delayed the day of surgery, call [phone] or [phone].     If your physical condition changes (like a fever, cold, flu, etc.) call your surgeon. Home medication(s) reviewed and verified via      LIST   VERBAL   during PAT appointment. The patient was contacted by    IN-PERSON  The patient verbalizes understanding of all instructions and      DOES NOT   need reinforcement.

## 2021-11-16 LAB
SARS-COV-2, XPLCVT: NOT DETECTED
SOURCE, COVRS: NORMAL

## 2021-11-17 ENCOUNTER — ANESTHESIA EVENT (OUTPATIENT)
Dept: SURGERY | Age: 26
End: 2021-11-17
Payer: COMMERCIAL

## 2021-11-18 ENCOUNTER — ANESTHESIA (OUTPATIENT)
Dept: SURGERY | Age: 26
End: 2021-11-18
Payer: COMMERCIAL

## 2021-11-18 ENCOUNTER — HOSPITAL ENCOUNTER (OUTPATIENT)
Age: 26
Setting detail: OUTPATIENT SURGERY
Discharge: HOME | End: 2021-11-18
Attending: ORTHOPAEDIC SURGERY | Admitting: ORTHOPAEDIC SURGERY
Payer: COMMERCIAL

## 2021-11-18 VITALS
OXYGEN SATURATION: 98 % | TEMPERATURE: 98.4 F | RESPIRATION RATE: 14 BRPM | HEART RATE: 99 BPM | SYSTOLIC BLOOD PRESSURE: 116 MMHG | BODY MASS INDEX: 30.79 KG/M2 | WEIGHT: 176.59 LBS | DIASTOLIC BLOOD PRESSURE: 79 MMHG

## 2021-11-18 LAB — HCG UR QL: NEGATIVE

## 2021-11-18 PROCEDURE — 2709999900 HC NON-CHARGEABLE SUPPLY: Performed by: ORTHOPAEDIC SURGERY

## 2021-11-18 PROCEDURE — 76210000017 HC OR PH I REC 1.5 TO 2 HR: Performed by: ORTHOPAEDIC SURGERY

## 2021-11-18 PROCEDURE — 81025 URINE PREGNANCY TEST: CPT

## 2021-11-18 PROCEDURE — 74011000250 HC RX REV CODE- 250: Performed by: ORTHOPAEDIC SURGERY

## 2021-11-18 PROCEDURE — 77030006773 HC BLD SAW OSC BRSM -A: Performed by: ORTHOPAEDIC SURGERY

## 2021-11-18 PROCEDURE — 74011250637 HC RX REV CODE- 250/637: Performed by: ORTHOPAEDIC SURGERY

## 2021-11-18 PROCEDURE — C1713 ANCHOR/SCREW BN/BN,TIS/BN: HCPCS | Performed by: ORTHOPAEDIC SURGERY

## 2021-11-18 PROCEDURE — 74011250636 HC RX REV CODE- 250/636: Performed by: NURSE ANESTHETIST, CERTIFIED REGISTERED

## 2021-11-18 PROCEDURE — 74011250636 HC RX REV CODE- 250/636: Performed by: ANESTHESIOLOGY

## 2021-11-18 PROCEDURE — 77030020143 HC AIRWY LARYN INTUB CGAS -A: Performed by: ANESTHESIOLOGY

## 2021-11-18 PROCEDURE — 76060000033 HC ANESTHESIA 1 TO 1.5 HR: Performed by: ORTHOPAEDIC SURGERY

## 2021-11-18 PROCEDURE — 74011000250 HC RX REV CODE- 250: Performed by: NURSE ANESTHETIST, CERTIFIED REGISTERED

## 2021-11-18 PROCEDURE — 76210000020 HC REC RM PH II FIRST 0.5 HR: Performed by: ORTHOPAEDIC SURGERY

## 2021-11-18 PROCEDURE — 74011250636 HC RX REV CODE- 250/636: Performed by: ORTHOPAEDIC SURGERY

## 2021-11-18 PROCEDURE — 77030002916 HC SUT ETHLN J&J -A: Performed by: ORTHOPAEDIC SURGERY

## 2021-11-18 PROCEDURE — 76010000149 HC OR TIME 1 TO 1.5 HR: Performed by: ORTHOPAEDIC SURGERY

## 2021-11-18 DEVICE — SONICANCHOR KIT
Type: IMPLANTABLE DEVICE | Site: ANKLE | Status: FUNCTIONAL
Brand: SONICANCHOR

## 2021-11-18 RX ORDER — ONDANSETRON 2 MG/ML
4 INJECTION INTRAMUSCULAR; INTRAVENOUS AS NEEDED
Status: DISCONTINUED | OUTPATIENT
Start: 2021-11-18 | End: 2021-11-18 | Stop reason: HOSPADM

## 2021-11-18 RX ORDER — GABAPENTIN 300 MG/1
600 CAPSULE ORAL
Status: COMPLETED | OUTPATIENT
Start: 2021-11-18 | End: 2021-11-18

## 2021-11-18 RX ORDER — SODIUM CHLORIDE, SODIUM LACTATE, POTASSIUM CHLORIDE, CALCIUM CHLORIDE 600; 310; 30; 20 MG/100ML; MG/100ML; MG/100ML; MG/100ML
125 INJECTION, SOLUTION INTRAVENOUS CONTINUOUS
Status: DISCONTINUED | OUTPATIENT
Start: 2021-11-18 | End: 2021-11-18 | Stop reason: HOSPADM

## 2021-11-18 RX ORDER — NALOXONE HYDROCHLORIDE 0.4 MG/ML
0.04 INJECTION, SOLUTION INTRAMUSCULAR; INTRAVENOUS; SUBCUTANEOUS
Status: DISCONTINUED | OUTPATIENT
Start: 2021-11-18 | End: 2021-11-18 | Stop reason: HOSPADM

## 2021-11-18 RX ORDER — ONDANSETRON 2 MG/ML
INJECTION INTRAMUSCULAR; INTRAVENOUS AS NEEDED
Status: DISCONTINUED | OUTPATIENT
Start: 2021-11-18 | End: 2021-11-18 | Stop reason: HOSPADM

## 2021-11-18 RX ORDER — FENTANYL CITRATE 50 UG/ML
25 INJECTION, SOLUTION INTRAMUSCULAR; INTRAVENOUS
Status: DISCONTINUED | OUTPATIENT
Start: 2021-11-18 | End: 2021-11-18 | Stop reason: HOSPADM

## 2021-11-18 RX ORDER — SODIUM CHLORIDE 0.9 % (FLUSH) 0.9 %
5-40 SYRINGE (ML) INJECTION AS NEEDED
Status: DISCONTINUED | OUTPATIENT
Start: 2021-11-18 | End: 2021-11-18 | Stop reason: HOSPADM

## 2021-11-18 RX ORDER — CEFAZOLIN SODIUM 1 G/3ML
INJECTION, POWDER, FOR SOLUTION INTRAMUSCULAR; INTRAVENOUS AS NEEDED
Status: DISCONTINUED | OUTPATIENT
Start: 2021-11-18 | End: 2021-11-18

## 2021-11-18 RX ORDER — MIDAZOLAM HYDROCHLORIDE 1 MG/ML
INJECTION, SOLUTION INTRAMUSCULAR; INTRAVENOUS AS NEEDED
Status: DISCONTINUED | OUTPATIENT
Start: 2021-11-18 | End: 2021-11-18 | Stop reason: HOSPADM

## 2021-11-18 RX ORDER — PROPOFOL 10 MG/ML
INJECTION, EMULSION INTRAVENOUS AS NEEDED
Status: DISCONTINUED | OUTPATIENT
Start: 2021-11-18 | End: 2021-11-18 | Stop reason: HOSPADM

## 2021-11-18 RX ORDER — LIDOCAINE HYDROCHLORIDE 20 MG/ML
INJECTION, SOLUTION EPIDURAL; INFILTRATION; INTRACAUDAL; PERINEURAL AS NEEDED
Status: DISCONTINUED | OUTPATIENT
Start: 2021-11-18 | End: 2021-11-18 | Stop reason: HOSPADM

## 2021-11-18 RX ORDER — SODIUM CHLORIDE 0.9 % (FLUSH) 0.9 %
5-40 SYRINGE (ML) INJECTION EVERY 8 HOURS
Status: DISCONTINUED | OUTPATIENT
Start: 2021-11-18 | End: 2021-11-18 | Stop reason: HOSPADM

## 2021-11-18 RX ORDER — LIDOCAINE HYDROCHLORIDE 10 MG/ML
0.1 INJECTION, SOLUTION EPIDURAL; INFILTRATION; INTRACAUDAL; PERINEURAL AS NEEDED
Status: DISCONTINUED | OUTPATIENT
Start: 2021-11-18 | End: 2021-11-18 | Stop reason: HOSPADM

## 2021-11-18 RX ORDER — FENTANYL CITRATE 50 UG/ML
INJECTION, SOLUTION INTRAMUSCULAR; INTRAVENOUS AS NEEDED
Status: DISCONTINUED | OUTPATIENT
Start: 2021-11-18 | End: 2021-11-18 | Stop reason: HOSPADM

## 2021-11-18 RX ORDER — DEXAMETHASONE SODIUM PHOSPHATE 4 MG/ML
INJECTION, SOLUTION INTRA-ARTICULAR; INTRALESIONAL; INTRAMUSCULAR; INTRAVENOUS; SOFT TISSUE AS NEEDED
Status: DISCONTINUED | OUTPATIENT
Start: 2021-11-18 | End: 2021-11-18 | Stop reason: HOSPADM

## 2021-11-18 RX ORDER — HYDROMORPHONE HYDROCHLORIDE 1 MG/ML
.25-1 INJECTION, SOLUTION INTRAMUSCULAR; INTRAVENOUS; SUBCUTANEOUS
Status: DISCONTINUED | OUTPATIENT
Start: 2021-11-18 | End: 2021-11-18 | Stop reason: HOSPADM

## 2021-11-18 RX ORDER — ACETAMINOPHEN 325 MG/1
650 TABLET ORAL
Status: COMPLETED | OUTPATIENT
Start: 2021-11-18 | End: 2021-11-18

## 2021-11-18 RX ORDER — DIPHENHYDRAMINE HYDROCHLORIDE 50 MG/ML
12.5 INJECTION, SOLUTION INTRAMUSCULAR; INTRAVENOUS AS NEEDED
Status: DISCONTINUED | OUTPATIENT
Start: 2021-11-18 | End: 2021-11-18 | Stop reason: HOSPADM

## 2021-11-18 RX ORDER — FLUMAZENIL 0.1 MG/ML
0.2 INJECTION INTRAVENOUS
Status: DISCONTINUED | OUTPATIENT
Start: 2021-11-18 | End: 2021-11-18 | Stop reason: HOSPADM

## 2021-11-18 RX ORDER — ALBUTEROL SULFATE 0.83 MG/ML
2.5 SOLUTION RESPIRATORY (INHALATION) AS NEEDED
Status: DISCONTINUED | OUTPATIENT
Start: 2021-11-18 | End: 2021-11-18 | Stop reason: HOSPADM

## 2021-11-18 RX ADMIN — CEFAZOLIN SODIUM 2 G: 1 POWDER, FOR SOLUTION INTRAMUSCULAR; INTRAVENOUS at 09:05

## 2021-11-18 RX ADMIN — FENTANYL CITRATE 100 MCG: 50 INJECTION, SOLUTION INTRAMUSCULAR; INTRAVENOUS at 08:56

## 2021-11-18 RX ADMIN — PROPOFOL 20 MG: 10 INJECTION, EMULSION INTRAVENOUS at 09:43

## 2021-11-18 RX ADMIN — PROPOFOL 20 MG: 10 INJECTION, EMULSION INTRAVENOUS at 09:35

## 2021-11-18 RX ADMIN — PROPOFOL 20 MG: 10 INJECTION, EMULSION INTRAVENOUS at 09:41

## 2021-11-18 RX ADMIN — ONDANSETRON HYDROCHLORIDE 4 MG: 2 SOLUTION INTRAMUSCULAR; INTRAVENOUS at 09:05

## 2021-11-18 RX ADMIN — FENTANYL CITRATE 50 MCG: 50 INJECTION, SOLUTION INTRAMUSCULAR; INTRAVENOUS at 09:41

## 2021-11-18 RX ADMIN — MIDAZOLAM HYDROCHLORIDE 2 MG: 1 INJECTION, SOLUTION INTRAMUSCULAR; INTRAVENOUS at 08:55

## 2021-11-18 RX ADMIN — SODIUM CHLORIDE, POTASSIUM CHLORIDE, SODIUM LACTATE AND CALCIUM CHLORIDE: 600; 310; 30; 20 INJECTION, SOLUTION INTRAVENOUS at 09:34

## 2021-11-18 RX ADMIN — PROPOFOL 20 MG: 10 INJECTION, EMULSION INTRAVENOUS at 09:37

## 2021-11-18 RX ADMIN — ACETAMINOPHEN 650 MG: 325 TABLET ORAL at 08:12

## 2021-11-18 RX ADMIN — MIDAZOLAM HYDROCHLORIDE 3 MG: 1 INJECTION, SOLUTION INTRAMUSCULAR; INTRAVENOUS at 08:49

## 2021-11-18 RX ADMIN — DEXAMETHASONE SODIUM PHOSPHATE 4 MG: 4 INJECTION, SOLUTION INTRAMUSCULAR; INTRAVENOUS at 09:04

## 2021-11-18 RX ADMIN — PROPOFOL 20 MG: 10 INJECTION, EMULSION INTRAVENOUS at 09:39

## 2021-11-18 RX ADMIN — ONDANSETRON 4 MG: 2 INJECTION INTRAMUSCULAR; INTRAVENOUS at 11:13

## 2021-11-18 RX ADMIN — PROPOFOL 30 MG: 10 INJECTION, EMULSION INTRAVENOUS at 08:57

## 2021-11-18 RX ADMIN — PROPOFOL 170 MG: 10 INJECTION, EMULSION INTRAVENOUS at 08:56

## 2021-11-18 RX ADMIN — FENTANYL CITRATE 50 MCG: 50 INJECTION, SOLUTION INTRAMUSCULAR; INTRAVENOUS at 09:53

## 2021-11-18 RX ADMIN — GABAPENTIN 600 MG: 300 CAPSULE ORAL at 08:13

## 2021-11-18 RX ADMIN — SODIUM CHLORIDE, POTASSIUM CHLORIDE, SODIUM LACTATE AND CALCIUM CHLORIDE 125 ML/HR: 600; 310; 30; 20 INJECTION, SOLUTION INTRAVENOUS at 08:25

## 2021-11-18 RX ADMIN — LIDOCAINE HYDROCHLORIDE 50 MG: 20 INJECTION, SOLUTION EPIDURAL; INFILTRATION; INTRACAUDAL; PERINEURAL at 08:56

## 2021-11-18 NOTE — DISCHARGE INSTRUCTIONS
DISCHARGE SUMMARY from your Nurse      PATIENT INSTRUCTIONS    After general anesthesia or intravenous sedation, for 24 hours or while taking prescription Narcotics:  · Limit your activities  · Do not drive and operate hazardous machinery  · Do not make important personal or business decisions  · Do  not drink alcoholic beverages  · If you have not urinated within 8 hours after discharge, please contact your surgeon on call. Report the following to your surgeon:  · Excessive pain, swelling, redness or odor of or around the surgical area  · Temperature over 100.5  · Nausea and vomiting lasting longer than 4 hours or if unable to take medications  · Any signs of decreased circulation or nerve impairment to extremity: change in color, persistent  numbness, tingling, coldness or increase pain  · Any questions      GOOD HELP TO FIGHT AN INFECTION  Here are a few tip to help reduce the chance of getting an infection after surgery:   Wash Your Hands   Good handwashing is the most important thing you and your caregiver can do.  Wash before and after caring for any wounds. Dry your hand with a clean towel.  Wash with soap and water for at least 20 seconds. A TIP: sing the \"Happy Birthday\" song through one time while washing to help with the timing.  Use a hand  in between washings.  Shower   When your surgeon says it is OK to take a shower, use a new bar of antibacterial soap (if that is what you use, and keep that bar of soap ONLY for your use), or antibacterial body wash.  Use a clean wash cloth or sponge when you bathe.  Dry off with a clean towel  after every bath - be careful around any wounds, skin staples, sutures or surgical glue over/on wounds.  Do not enter swimming pools, hot tubs, lakes, rivers and/or ocean until wounds are healed and your doctor/surgeon says it is OK.  Use Clean Sheets   Sleep on freshly laundered sheets after your surgery.    Keep the surgery site covered with a clean, dry bandage (if instructed to do so). If the bandage becomes soiled, reapply a new, dry, clean bandage.  Do not allow pets to sleep with you while your wound is healing.  Lifestyle Modification and Controlling Your Blood Sugar   Smoking slows wound healing. Stop smoking and limit exposure to second-hand smoke.  High blood sugar slows wound healing. Eat a well-balanced diet to provide proper nutrition while healing   Monitor your blood sugar (if you are a diabetic) and take your medications as you are suppose to so you can control you blood sugar after surgery. COUGH AND DEEP BREATHE    Breathing deeply and coughing are very important exercises to do after surgery. Deep breathing and coughing open the little air tubes and air sacks in your lungs. You take deep breaths every day. You may not even notice - it is just something you do when you sigh or yawn. It is a natural exercise you do to keep these air passages open. After surgery, take deep breaths and cough, on purpose. DIRECTIONS:  · Take 10 to 15 slow deep breaths every hour while awake. · Breathe in deeply, and hold it for 2 seconds. · Exhale slowly through puckered lips, like blowing up a balloon. · After every 4th or 5th deep breath, hug your pillow to your chest or belly and give a hard, deep cough. Yes, it will probably hurt. But doing this exercise is a very important part of healing after surgery. Take your pain medicine to help you do this exercise without too much pain. Coughing and deep breathing help prevent bronchitis and pneumonia after surgery. If you had chest or belly surgery, use a pillow as a \"hug ag\" and hold it tightly to your chest or belly when you cough. ANKLE PUMPS    Ankle pumps increase the circulation of oxygenated blood to your lower extremities and decrease your risk for circulation problems such as blood clots.  They also stretch the muscles, tendons and ligaments in your foot and ankle, and prevent joint contracture in the ankle and foot, especially after surgeries on the legs. It is important to do ankle pump exercises regularly after surgery because immobility increases your risk for developing a blood clot. Your doctor may also have you take an Aspirin for the next few days as well. If your doctor did not ask you to take an Aspirin, consult with him before starting Aspirin therapy on your own. The exercise is quite simple. · Slowly point your foot forward, feeling the muscles on the top of your lower leg stretch, and hold this position for 5 seconds. · Next, pull your foot back toward you as far as possible, stretching the calf muscles, and hold that position for 5 seconds. · Repeat with the other foot. · Perform 10 repetitions every hour while awake for both ankles if possible (down and then up with the foot once is one repetition). You should feel gentle stretching of the muscles in your lower leg when doing this exercise. If you feel pain, or your range of motion is limited, don't push too hard. Only go the limit your joint and muscles will let you go. If you have increasing pain, progressively worsening leg warmth or swelling, STOP the exercise and call your doctor. MEDICATION AND   SIDE EFFECT GUIDE    The Giselle HernandezKenvil MEDICATION AND SIDE EFFECT GUIDE was provided to the PATIENT AND CARE PROVIDER. Information provided includes instruction about drug purpose and common side effects for the following medications:   · ***        These are general instructions for a healthy lifestyle:    *   Please give a list of your current medications to your Primary Care Provider. *   Please update this list whenever your medications are discontinued, doses are changed, or new medications (including over-the-counter products) are added.   *   Please carry medication information at all times in case of emergency situations. About Smoking  No smoking / No tobacco products  Avoid exposure to second hand smoke     Surgeon General's Warning:  Quitting smoking now greatly reduces serious risk to your health. Obesity, smoking, and sedentary lifestyle greatly increases your risk for illness and disease. A healthy diet, regular physical exercise & weight monitoring are important for maintaining a healthy lifestyle. Congestive Heart Failure  You may be retaining fluid if you have a history of heart failure or if you experience any of the following symptoms:  Weight gain of 3 pounds or more overnight or 5 pounds in a week, increased swelling in your hands or feet or shortness of breath while lying flat in bed. Please call your doctor as soon as you notice any of these symptoms; do not wait until your next office visit. Recognize signs and symptoms of STROKE:  F -  Face looks uneven  A -  Arms unable to move or move evenly  S -  Speech slurred or non-existent  T -  Time-call 911 as soon as signs and symptoms begin-DO NOT go          back to bed or wait to see if you get better-TIME IS BRAIN. Warning Signs of HEART ATTACK   Call 911 if you have these symptoms:     Chest discomfort. Most heart attacks involve discomfort in the center of the chest that lasts more than a few minutes, or that goes away and comes back. It can feel like uncomfortable pressure, squeezing, fullness, or pain.  Discomfort in other areas of the upper body. Symptoms can include pain or discomfort in one or both arms, the back, neck, jaw, or stomach.  Shortness of breath with or without chest discomfort.  Other signs may include breaking out in a cold sweat, nausea, or lightheadedness. Don't wait more than five minutes to call 911 - MINUTES MATTER! Fast action can save your life. Calling 911 is almost always the fastest way to get lifesaving treatment.  Emergency Medical Services staff can begin treatment when they arrive -- up to an hour sooner than if someone gets to the hospital by car. Learning About Coronavirus [phone])  Coronavirus [phone]): Overview  What is coronavirus (COVID-19)? The coronavirus disease (COVID-19) is caused by a virus. It is an illness that was first found in [place], [place], in December 2019. It has since spread worldwide. The virus can cause fever, cough, and trouble breathing. In severe cases, it can cause pneumonia and make it hard to breathe without help. It can cause death. Coronaviruses are a large group of viruses. They cause the common cold. They also cause more serious illnesses like Middle East respiratory syndrome (MERS) and severe acute respiratory syndrome (SARS). COVID-19 is caused by a novel coronavirus. That means it's a new type that has not been seen in people before. This virus spreads person-to-person through droplets from coughing and sneezing. It can also spread when you are close to someone who is infected. And it can spread when you touch something that has the virus on it, such as a doorknob or a tabletop. What can you do to protect yourself from coronavirus (COVID-19)? The best way to protect yourself from getting sick is to:  · Avoid areas where there is an outbreak. · Avoid contact with people who may be infected. · Wash your hands often with soap or alcohol-based hand sanitizers. · Avoid crowds and try to stay at least 6 feet away from other people. · Wash your hands often, especially after you cough or sneeze. Use soap and water, and scrub for at least 20 seconds. If soap and water aren't available, use an alcohol-based hand . · Avoid touching your mouth, nose, and eyes. What can you do to avoid spreading the virus to others? To help avoid spreading the virus to others:  · Cover your mouth with a tissue when you cough or sneeze. Then throw the tissue in the trash. · Use a disinfectant to clean things that you touch often.   · Stay home if you are sick or have been exposed to the virus. Don't go to school, work, or public areas. And don't use public transportation. · If you are sick:  ? Leave your home only if you need to get medical care. But call the doctor's office first so they know you're coming. And wear a face mask, if you have one.  ? If you have a face mask, wear it whenever you're around other people. It can help stop the spread of the virus when you cough or sneeze. ? Clean and disinfect your home every day. Use household  and disinfectant wipes or sprays. Take special care to clean things that you grab with your hands. These include doorknobs, remote controls, phones, and handles on your refrigerator and microwave. And don't forget countertops, tabletops, bathrooms, and computer keyboards. When to call for help  Call 911 anytime you think you may need emergency care. For example, call if:  · You have severe trouble breathing. (You can't talk at all.)  · You have constant chest pain or pressure. · You are severely dizzy or lightheaded. · You are confused or can't think clearly. · Your face and lips have a blue color. · You pass out (lose consciousness) or are very hard to wake up. Call your doctor now if you develop symptoms such as:  · Shortness of breath. · Fever. · Cough. If you need to get care, call ahead to the doctor's office for instructions before you go. Make sure you wear a face mask, if you have one, to prevent exposing other people to the virus. Where can you get the latest information? The following health organizations are tracking and studying this virus. Their websites contain the most up-to-date information. Maryam Tierney also learn what to do if you think you may have been exposed to the virus. · U.S. Centers for Disease Control and Prevention (CDC): The CDC provides updated news about the disease and travel advice. The website also tells you how to prevent the spread of infection.  www.cdc.gov  · [address] [employer] Organization (WHO): WHO offers information about the virus outbreaks. WHO also has travel advice. www.who.int  Current as of: April 1, 2020               Content Version: 12.4  © 2006-2020 Healthwise, Incorporated. Care instructions adapted under license by your healthcare professional. If you have questions about a medical condition or this instruction, always ask your healthcare professional. [address] any warranty or liability for your use of this information. The discharge information has been reviewed with the {PATIENT PARENT GUARDIAN:38787}. Any questions and concerns from the {PATIENT PARENT GUARDIAN:04947} have been addressed. The {PATIENT PARENT GUARDIAN:19452} verbalized understanding. CONTENTS FOUND IN YOUR DISCHARGE ENVELOPE:  [x]     Surgeon and Hospital Discharge Instructions  [x]     [facility] Care Provider Card  []     Medication & Side Effect Guide            (your newly prescribed medications have been marked/highlighted showing the most common side effects from   the classes of drugs on your prescriptions)  []     Medication Prescription(s) x *** ( [] These have been sent electronically to your pharmacy by your surgeon,   - OR -       your surgeon has already provided these to you during a previous/pre-op office visit)  []     [address]  []     Physical Therapy Prescription  []     Follow-up Appointment Cards  []     Surgery-related Pictures/Media  []     Pain block and/or block with On-Q Catheter from Anesthesia Service (information included in your instructions above)  []     Medical device information sheets/pamphlets from their    []     School/work excuse note. []     /parent work excuse note.       The following personal items collected during your admission are returned to you:   Dental Appliance:    Vision:    Hearing Aid:    Jewelry:    Clothing:    Other Valuables:    Valuables sent to safe:

## 2021-11-18 NOTE — BRIEF OP NOTE
Brief Postoperative Note    Patient: Apple Long  YOB: 1995  MRN: [id number]    Date of Procedure: 11/18/2021     Pre-Op Diagnosis:   1. Right ankle instability [M25.371]  2. Ankle impingement syndrome, right [M25.871]    Post-Op Diagnosis: Same as preoperative diagnosis. Procedure(s):  RIGHT ANKLE LATERAL LIGAMENT RECONSTRUCTION WITH MODPOOLD BROSTROM     Surgeon(s): Andrzej Pretty MD    Surgical Assistant: Alexy Umanzor    Anesthesia: General with local    Estimated Blood Loss (mL): less than 5cc     Complications: None    Specimens: * No specimens in log *     Implants:   Implant Name Type Inv. Item Serial No.  Lot No. LRB No. Used Action   sonicanchor [id number]   [employer] [id number] Right 2 Implanted       [place] sonic anchor x 2    Drains: * No LDAs found *    Findings: positive anterior drawer preop, negative anterior drawer postop, hypertrophy of the anterolateral tissue surrounding the ATFL    TT: Thigh 275mmHg x 25 min    Electronically Signed by Gerson Bajwa MD on 11/18/2021 at 8:39 AM

## 2021-11-18 NOTE — H&P
Date of Surgery Update:  Deanna Rooney was seen and examined. History and physical has been reviewed. The patient has been examined. There have been no significant clinical changes since the completion of the originally dated History and Physical.  Patient identified by surgeon; surgical site was confirmed by patient and surgeon. Full paper H&P on chart    Signed By: Don Butcher.  Lan Mohamud MD     November 18, 2021 8:39 AM

## 2021-11-18 NOTE — ANESTHESIA PREPROCEDURE EVALUATION
Relevant Problems   RESPIRATORY SYSTEM   (+) Asthma      ENDOCRINE   (+) Ankylosing spondylitis (HCC)   (+) Ankylosing spondylitis of lumbar region [facility])       Anesthetic History   No history of anesthetic complications            Review of Systems / Medical History  Patient summary reviewed, nursing notes reviewed and pertinent labs reviewed    Pulmonary            Asthma        Neuro/Psych         Headaches     Cardiovascular            Dysrhythmias : sinus tachycardia      Exercise tolerance: >4 METS     GI/Hepatic/Renal  Within defined limits              Endo/Other        Arthritis     Other Findings   Comments:  Ankylosing spondylitis - limited neck mobility (causes weakness and numb arms)    Scoliosis    TMJ           Physical Exam    Airway  Mallampati: II    Neck ROM: decreased range of motion   Mouth opening: Normal     Cardiovascular  Regular rate and rhythm,  S1 and S2 normal,  no murmur, click, rub, or gallop  Rhythm: regular  Rate: normal         Dental  No notable dental hx       Pulmonary  Breath sounds clear to auscultation               Abdominal  GI exam deferred       Other Findings            Anesthetic Plan    ASA: 3  Anesthesia type: general          Induction: Intravenous  Anesthetic plan and risks discussed with: Patient

## 2021-11-18 NOTE — ANESTHESIA POSTPROCEDURE EVALUATION
Procedure(s):  RIGHT ANKLE LATERAL LIGAMENT RECONSTRUCTION WITH MODIFED BROSTROM (GEN/LOCAL) (LATEX ALLERGY). general    Anesthesia Post Evaluation      Multimodal analgesia: multimodal analgesia not used between 6 hours prior to anesthesia start to PACU discharge  Patient location during evaluation: PACU  Patient participation: complete - patient participated  Level of consciousness: awake  Pain management: adequate  Airway patency: patent  Anesthetic complications: no  Cardiovascular status: acceptable, blood pressure returned to baseline and hemodynamically stable  Respiratory status: acceptable  Hydration status: acceptable  Post anesthesia nausea and vomiting:  controlled      INITIAL Post-op Vital signs:   Vitals Value Taken Time   /67 11/18/21 1020   Temp     Pulse 108 11/18/21 1023   Resp 16 11/18/21 1023   SpO2 97 % 11/18/21 1023   Vitals shown include unvalidated device data.

## 2021-11-19 NOTE — OP NOTES
[facility] 81  OPERATIVE REPORT    Name:  Aure Johnson  MR#:  [id number]  :  1995  ACCOUNT #:  [de-identified]  DATE OF SERVICE:  2021    PREOPERATIVE DIAGNOSES:  1. Right ankle instability. 2.  Right ankle impingement. POSTOPERATIVE DIAGNOSES:  1. Right ankle instability. 2.  Right ankle impingement. PROCEDURE PERFORMED:  Right ankle lateral ligament reconstruction with modified Brostrom. SURGEON:  Amaryllis Lundborg, MD    ASSISTANT:  Yoly Saunders    ANESTHESIA:  General and local.    COMPLICATIONS:  None. SPECIMENS REMOVED:  None. IMPLANTS:  [employer] K[employer]Anchor x2. ESTIMATED BLOOD LOSS:  Less than 5 mL. DRAINS:  None. FINDINGS:  Positive anterior drawer preop, negative anterior drawer postop, hypertrophied anterolateral tissue surrounding the ATFL. TOURNIQUET TIME:  275 mmHg on the thigh for 25 minutes. INDICATIONS FOR PROCEDURE:  This is a 82-year-old female with right ankle instability and impingement. The pain and instability are negatively impacting her quality of life and her ability to stay active and she has failed to get adequate relief with a conservative course of antiinflammatories, bracing, activity modification and physical therapy. I offered both conservative and surgical management options to her. I discussed the risks of surgery which include, but not limited to, complications of anesthesia including death, pain, bleeding, infection, damage to surrounding structures, recurrent instability, recurrent pain, persistent pain, DVT, PE, wound healing problems and the need further surgery. The patient verbalized understanding and elected to proceed with surgical intervention. PROCEDURE:  The correct patient, extremity and operation were identified in the preoperative holding area and I marked her right ankle.   The Anesthesia team took the patient back to the operating room and placed her in the supine position, placed her under general anesthesia and all bony prominences were padded well and she was secured to the table with a safety strap. The right lower extremity was then prepped and draped in the usual sterile fashion and a preoperative time-out was called. Again, the correct patient, extremity and operation were identified, all parties were in agreement and we proceeded with the operation. The patient received IV antibiotics within 30 minutes of the incision and tolerated Ancef well. Prior to prepping and draping, a nonsterile well-padded upper thigh tourniquet was placed. The right lower extremity was then elevated for several minutes and the tourniquet was raised. A longitudinal incision was made over the anterior one-third of the anterior border of the distal fibula. Sharp dissection was carried down to the level of the skin and blunt dissection carried down to the level of the periosteum. Care was taken to retract and protect the superficial peroneal nerve throughout the entirety of the case. A longitudinal incision was made of the periosteum over the anterior one-quarter of the distal fibula and the periosteum and the ATFL were taken off as one layer. The ATFL was damaged and barely attached to the distal fibula. The plane was then developed between the subcutaneous tissue and the extensor retinaculum. A rongeur was used to roughen the anterior aspect of the distal fibula to prepare for the ligament reconstruction. Two holes were drilled with the [employer] drill bit in the distal fibula approximately 1.5 cm apart. The [place] SonicAnchor was inserted with the handpiece in the predrilled hole. Foot pedal was depressed. The anchor was seated fully into the predrilled hole and allowed to cure. After the foot pedal was released, the handpiece was removed. Longitudinal traction was placed on the anchor and there was satisfactory purchase in the bone.   This was repeated for the second anchor with satisfactory purchase with longitudinal traction. The sutures were then shuttled through the extensor retinaculum, the ankle held in neutral dorsiflexion and approximately 5 degrees of valgus and the sutures were tied affecting a satisfactory repair. The sutures were then passed through the periosteum for a double-row repair and tied. The sutures were then cut. The anterior drawer test was negative postoperatively. Prior to making the incision, 20 mL of a mixture of 1% lidocaine without epinephrine and 0.5% Marcaine without epinephrine was infiltrated in the distal lateral ankle for anesthesia block. There was adequate anesthesia. The wound was copiously irrigated with normal saline and the wound closed in layers. Sterile dressing was applied and a well-padded, well-molded short leg splint was applied to the right lower extremity with the ankle in neutral dorsiflexion and slight valgus position. The tourniquet was dropped. The patient was awakened from anesthesia and taken to the recovery room in hemodynamically stable condition. All lap and sharp counts were correct at the end of the case. POSTOPERATIVE CARE. The patient will start aspirin 81 mg p.o. b.i.d. for DVT prophylaxis for six weeks starting on postop day #1. She will be discharged home once she meets PACU criteria today and she will follow up with Rosetta Gross in the [place] [address] in two weeks for postoperative followup, [phone].       Andriy Krause MD      [id number]  D:  11/18/2021 9:38  T:  11/18/2021 19:24  JOB #:  [id number]

## 2021-12-15 ENCOUNTER — TELEPHONE (OUTPATIENT)
Dept: CARDIOLOGY CLINIC | Age: 26
End: 2021-12-15

## 2021-12-15 NOTE — TELEPHONE ENCOUNTER
Spoke with patient she stated she felt better than this morning not sure if it's from the cough or not. Has a dry cough no chest pains or SOB noted at this time. Patient has an appt to see Dr Zachayr Qureshi tomorrow advised patient if she was to experience increased chest pains or SOB to be seen at the ER. Patient verbalized understanding.

## 2021-12-15 NOTE — TELEPHONE ENCOUNTER
Patient states she has been having chest pain since Friday, she states she has a cough and feels like she has tasted blood. Last night she woke up with chest pain. Patient was advised to get tested for Covid, she states she tested yesterday at [facility]. She states she will have her results today after 3:00pm    Patient was advised, she could possibly have pulmonary embolism, she is inquiring whether she should go to the ER.           Transferred to [employer]

## 2021-12-16 ENCOUNTER — HOSPITAL ENCOUNTER (EMERGENCY)
Age: 26
Discharge: HOME | End: 2021-12-16
Attending: EMERGENCY MEDICINE
Payer: COMMERCIAL

## 2021-12-16 ENCOUNTER — APPOINTMENT (OUTPATIENT)
Dept: CT IMAGING | Age: 26
End: 2021-12-16
Attending: EMERGENCY MEDICINE
Payer: COMMERCIAL

## 2021-12-16 VITALS
TEMPERATURE: 98 F | BODY MASS INDEX: 28.77 KG/M2 | OXYGEN SATURATION: 96 % | DIASTOLIC BLOOD PRESSURE: 77 MMHG | WEIGHT: 165 LBS | RESPIRATION RATE: 20 BRPM | SYSTOLIC BLOOD PRESSURE: 124 MMHG | HEART RATE: 104 BPM

## 2021-12-16 DIAGNOSIS — R07.9 CHEST PAIN, UNSPECIFIED TYPE: Primary | ICD-10-CM

## 2021-12-16 LAB
ALBUMIN SERPL-MCNC: 4.1 G/DL (ref 3.5–5)
ALBUMIN/GLOB SERPL: 1.1 {RATIO} (ref 1.1–2.2)
ALP SERPL-CCNC: 91 U/L (ref 45–117)
ALT SERPL-CCNC: 47 U/L (ref 12–78)
ANION GAP SERPL CALC-SCNC: 15 MMOL/L (ref 5–15)
AST SERPL-CCNC: 22 U/L (ref 15–37)
BASOPHILS # BLD: 0.1 K/UL (ref 0–0.1)
BASOPHILS NFR BLD: 1 % (ref 0–1)
BILIRUB SERPL-MCNC: 0.3 MG/DL (ref 0.2–1)
BNP SERPL-MCNC: 41 PG/ML (ref 0–125)
BUN SERPL-MCNC: 16 MG/DL (ref 6–20)
BUN/CREAT SERPL: 18 (ref 12–20)
CALCIUM SERPL-MCNC: 9.5 MG/DL (ref 8.5–10.1)
CHLORIDE SERPL-SCNC: 103 MMOL/L (ref 97–108)
CO2 SERPL-SCNC: 23 MMOL/L (ref 21–32)
COMMENT, HOLDF: NORMAL
CREAT SERPL-MCNC: 0.89 MG/DL (ref 0.55–1.02)
DIFFERENTIAL METHOD BLD: ABNORMAL
EOSINOPHIL # BLD: 0.5 K/UL (ref 0–0.4)
EOSINOPHIL NFR BLD: 5 % (ref 0–7)
ERYTHROCYTE [DISTWIDTH] IN BLOOD BY AUTOMATED COUNT: 11.8 % (ref 11.5–14.5)
GLOBULIN SER CALC-MCNC: 3.8 G/DL (ref 2–4)
GLUCOSE SERPL-MCNC: 117 MG/DL (ref 65–100)
HCG SERPL QL: NEGATIVE
HCT VFR BLD AUTO: 43 % (ref 35–47)
HGB BLD-MCNC: 14.6 G/DL (ref 11.5–16)
IMM GRANULOCYTES # BLD AUTO: 0 K/UL (ref 0–0.04)
IMM GRANULOCYTES NFR BLD AUTO: 0 % (ref 0–0.5)
LYMPHOCYTES # BLD: 3.2 K/UL (ref 0.8–3.5)
LYMPHOCYTES NFR BLD: 29 % (ref 12–49)
MAGNESIUM SERPL-MCNC: 1.9 MG/DL (ref 1.6–2.4)
MCH RBC QN AUTO: 31.1 PG (ref 26–34)
MCHC RBC AUTO-ENTMCNC: 34 G/DL (ref 30–36.5)
MCV RBC AUTO: 91.5 FL (ref 80–99)
MONOCYTES # BLD: 0.6 K/UL (ref 0–1)
MONOCYTES NFR BLD: 6 % (ref 5–13)
NEUTS SEG # BLD: 6.7 K/UL (ref 1.8–8)
NEUTS SEG NFR BLD: 60 % (ref 32–75)
NRBC # BLD: 0 K/UL (ref 0–0.01)
NRBC BLD-RTO: 0 PER 100 WBC
PLATELET # BLD AUTO: 375 K/UL (ref 150–400)
PMV BLD AUTO: 10.2 FL (ref 8.9–12.9)
POTASSIUM SERPL-SCNC: 4 MMOL/L (ref 3.5–5.1)
PROT SERPL-MCNC: 7.9 G/DL (ref 6.4–8.2)
RBC # BLD AUTO: 4.7 M/UL (ref 3.8–5.2)
SAMPLES BEING HELD,HOLD: NORMAL
SODIUM SERPL-SCNC: 141 MMOL/L (ref 136–145)
TROPONIN-HIGH SENSITIVITY: 8 NG/L (ref 0–51)
WBC # BLD AUTO: 11.2 K/UL (ref 3.6–11)

## 2021-12-16 PROCEDURE — 96375 TX/PRO/DX INJ NEW DRUG ADDON: CPT

## 2021-12-16 PROCEDURE — 96374 THER/PROPH/DIAG INJ IV PUSH: CPT

## 2021-12-16 PROCEDURE — 80053 COMPREHEN METABOLIC PANEL: CPT

## 2021-12-16 PROCEDURE — 84484 ASSAY OF TROPONIN QUANT: CPT

## 2021-12-16 PROCEDURE — 93005 ELECTROCARDIOGRAM TRACING: CPT

## 2021-12-16 PROCEDURE — 96372 THER/PROPH/DIAG INJ SC/IM: CPT

## 2021-12-16 PROCEDURE — 99285 EMERGENCY DEPT VISIT HI MDM: CPT

## 2021-12-16 PROCEDURE — 83735 ASSAY OF MAGNESIUM: CPT

## 2021-12-16 PROCEDURE — 85025 COMPLETE CBC W/AUTO DIFF WBC: CPT

## 2021-12-16 PROCEDURE — 71275 CT ANGIOGRAPHY CHEST: CPT

## 2021-12-16 PROCEDURE — 84703 CHORIONIC GONADOTROPIN ASSAY: CPT

## 2021-12-16 PROCEDURE — 36415 COLL VENOUS BLD VENIPUNCTURE: CPT

## 2021-12-16 PROCEDURE — 74011250636 HC RX REV CODE- 250/636: Performed by: EMERGENCY MEDICINE

## 2021-12-16 PROCEDURE — 74011000636 HC RX REV CODE- 636: Performed by: EMERGENCY MEDICINE

## 2021-12-16 PROCEDURE — 83880 ASSAY OF NATRIURETIC PEPTIDE: CPT

## 2021-12-16 RX ORDER — ENOXAPARIN SODIUM 100 MG/ML
1 INJECTION SUBCUTANEOUS ONCE
Status: COMPLETED | OUTPATIENT
Start: 2021-12-16 | End: 2021-12-16

## 2021-12-16 RX ORDER — DIPHENHYDRAMINE HYDROCHLORIDE 50 MG/ML
50 INJECTION, SOLUTION INTRAMUSCULAR; INTRAVENOUS ONCE
Status: COMPLETED | OUTPATIENT
Start: 2021-12-16 | End: 2021-12-16

## 2021-12-16 RX ADMIN — DIPHENHYDRAMINE HYDROCHLORIDE 50 MG: 50 INJECTION, SOLUTION INTRAMUSCULAR; INTRAVENOUS at 16:58

## 2021-12-16 RX ADMIN — ENOXAPARIN SODIUM 70 MG: 100 INJECTION SUBCUTANEOUS at 16:58

## 2021-12-16 RX ADMIN — IOPAMIDOL 100 ML: 755 INJECTION, SOLUTION INTRAVENOUS at 18:20

## 2021-12-16 RX ADMIN — METHYLPREDNISOLONE SODIUM SUCCINATE 125 MG: 125 INJECTION, POWDER, FOR SOLUTION INTRAMUSCULAR; INTRAVENOUS at 16:59

## 2021-12-16 NOTE — ED TRIAGE NOTES
Pt had right ligament reconstruction on her ankle Nov 18. Pt reports feeling short of breath Friday, chest pain started Saturday.

## 2021-12-16 NOTE — DISCHARGE INSTRUCTIONS
Thank you for allowing us to provide you with medical care today. We realize that you have many choices for your emergency care needs. We thank you for choosing [facility]. Please choose us in the future for any continued health care needs. The exam and treatment you received in the Emergency Department were for an emergent problem and are not intended as complete care. It is important that you follow up with a doctor, nurse practitioner, or physician's assistant for ongoing care. If your symptoms worsen or you do not improve as expected and you are unable to reach your usual health care provider, you should return to the Emergency Department. We are available 24 hours a day. Please make an appointment with your health care provider(s) for follow up of your Emergency Department visit. Take this sheet with you when you go to your follow-up visit.

## 2021-12-16 NOTE — ED PROVIDER NOTES
77-year-old female with a history of recent orthopedic surgery to the right lower extremity presents with chief complaint of chest pain and shortness of breath. Patient reports that the chest pain started abruptly at the end of last week. This was associated with shortness of breath. The pain is sharp in nature and worse with deep inspiration. She did feel as though she may have coughed up some blood over the weekend. She denies fevers, chills, abdominal pain, GI or urinary symptoms she has not on any birth control and does not smoke.            Past Medical History:   Diagnosis Date    Adverse effect of anesthesia     Patient stated she woke up \"In terror\" post op    Ankylosing spondylitis ([facility] [address] 06/2020    Asthma     Broken foot 07/2019    right foot, no surgery-wore boot/cast for 6 weeks    Chronic pain     generalized, ankle to back    Migraines     Muscle spasm     Pap smear for cervical cancer screening 11/10/2021    negative    Premature birth     at 29 weeks    Routine Papanicolaou smear 9/27/19 neg    Scoliosis     Tachycardia 09/2021    TMJ (temporomandibular joint syndrome)        Past Surgical History:   Procedure Laterality Date    HX KNEE ARTHROSCOPY Left 06/2018         Family History:   Problem Relation Age of Onset    No Known Problems Mother     No Known Problems Father     Lupus Sister     Other Sister         common variable immune deficiency    Neuropathy Sister     Other Maternal Grandfather         CFH    Breast Cancer Paternal Grandmother     Breast Cancer Other     Thyroid Disease Sister        Social History     Socioeconomic History    Marital status:      Spouse name: Not on file    Number of children: Not on file    Years of education: Not on file    Highest education level: Not on file   Occupational History     Comment: works at a pharmacy   Tobacco Use    Smoking status: Never Smoker    Smokeless tobacco: Never Used   Substance and Sexual Activity    Alcohol use: No    Drug use: No    Sexual activity: Yes     Partners: Male     Birth control/protection: Condom   Other Topics Concern    Not on file   Social History Narrative    Not on file     Social Determinants of Health     Financial Resource Strain:     Difficulty of Paying Living Expenses: Not on file   Food Insecurity:     Worried About Running Out of Food in the Last Year: Not on file    Ela of Food in the Last Year: Not on file   Transportation Needs:     Lack of Transportation (Medical): Not on file    Lack of Transportation (Non-Medical): Not on file   Physical Activity:     Days of Exercise per Week: Not on file    Minutes of Exercise per Session: Not on file   Stress:     Feeling of Stress : Not on file   Social Connections:     Frequency of Communication with Friends and Family: Not on file    Frequency of Social Gatherings with Friends and Family: Not on file    Attends Shinto Services: Not on file    Active Member of [address] [place] [address] or Organizations: Not on file    Attends Club or Organization Meetings: Not on file    Marital Status: Not on file   Intimate Partner Violence:     Fear of Current or Ex-Partner: Not on file    Emotionally Abused: Not on file    Physically Abused: Not on file    Sexually Abused: Not on file   Housing Stability:     Unable to Pay for Housing in the Last Year: Not on file    Number of [place] in the Last Year: Not on file    Unstable Housing in the Last Year: Not on file         ALLERGIES: Latex, Shellfish derived, Iodine, Other medication, Pcn [penicillins], and Sulfa (sulfonamide antibiotics)    Review of Systems   Constitutional: Negative for fever. HENT: Negative for rhinorrhea. Respiratory: Positive for shortness of breath. Cardiovascular: Positive for chest pain. Gastrointestinal: Negative for abdominal pain. Genitourinary: Negative for dysuria. Musculoskeletal: Negative for back pain. Skin: Negative for wound. Neurological: Negative for headaches. Psychiatric/Behavioral: Negative for confusion. Vitals:    12/16/21 1345 12/16/21 1346   BP: 122/78    Pulse:  (!) 112   Resp:  19   Temp:  99 °F (37.2 °C)   SpO2: 97% 95%            Physical Exam  Vitals and nursing note reviewed. Constitutional:       General: She is not in acute distress. Appearance: Normal appearance. She is well-developed. She is not ill-appearing, toxic-appearing or diaphoretic. HENT:      Head: Normocephalic and atraumatic. Eyes:      Extraocular Movements: Extraocular movements intact. Cardiovascular:      Rate and Rhythm: Tachycardia present. Pulses: Normal pulses. Heart sounds: Normal heart sounds. Pulmonary:      Effort: Pulmonary effort is normal. No respiratory distress. Breath sounds: Normal breath sounds. Abdominal:      General: Bowel sounds are normal. There is no distension. Palpations: Abdomen is soft. Tenderness: There is no abdominal tenderness. Musculoskeletal:         General: Normal range of motion. Cervical back: Normal range of motion. Skin:     General: Skin is warm and dry. Neurological:      General: No focal deficit present. Mental Status: She is alert and oriented to person, place, and time. Psychiatric:         Mood and Affect: Mood normal.          MDM  Number of Diagnoses or Management Options  Chest pain, unspecified type  Diagnosis management comments: Patient presents with chest pain concerning for PE. CTA was obtained after premedication. CTA shows no PE. Labs unremarkable. Patient reports history of autonomic instability and tachycardia. She follows with cardiology for this. Discussed my clinical impression(s), any labs and/or radiology results with the patient. I answered any questions and addressed any concerns. Discussed the importance of following up with their primary care physician and/or specialist(s).  Discussed signs or symptoms that would warrant return back to the ER for further evaluation. The patient is agreeable with discharge.        Amount and/or Complexity of Data Reviewed  Clinical lab tests: ordered and reviewed  Tests in the radiology section of CPT®: ordered and reviewed           Procedures

## 2021-12-17 LAB
ATRIAL RATE: 125 BPM
CALCULATED P AXIS, ECG09: 46 DEGREES
CALCULATED R AXIS, ECG10: 51 DEGREES
CALCULATED T AXIS, ECG11: 28 DEGREES
DIAGNOSIS, 93000: NORMAL
P-R INTERVAL, ECG05: 134 MS
Q-T INTERVAL, ECG07: 314 MS
QRS DURATION, ECG06: 74 MS
QTC CALCULATION (BEZET), ECG08: 453 MS
VENTRICULAR RATE, ECG03: 125 BPM

## 2022-01-20 DIAGNOSIS — Z30.09 FAMILY PLANNING COUNSELING: Primary | ICD-10-CM

## 2022-01-21 ENCOUNTER — TELEPHONE (OUTPATIENT)
Dept: OBGYN CLINIC | Age: 27
End: 2022-01-21

## 2022-01-21 DIAGNOSIS — Z30.09 FAMILY PLANNING COUNSELING: Primary | ICD-10-CM

## 2022-01-21 NOTE — TELEPHONE ENCOUNTER
Call received at 11:55am        27year old patient last seen in the office on 11/10/2021     Patient reports she sent a my chart message for a referral to [facility] and it was placed and she called and was told that they do not do family planning consults and that it would need to be changed to medication discussion in pregnancy    This nurse spoke to EW who will update the referral     Patient was advised and will call back later to check about making an appointment

## 2022-02-04 ENCOUNTER — OFFICE VISIT (OUTPATIENT)
Dept: RHEUMATOLOGY | Age: 27
End: 2022-02-04
Payer: COMMERCIAL

## 2022-02-04 VITALS
SYSTOLIC BLOOD PRESSURE: 123 MMHG | WEIGHT: 182 LBS | HEART RATE: 107 BPM | OXYGEN SATURATION: 98 % | DIASTOLIC BLOOD PRESSURE: 76 MMHG | TEMPERATURE: 98.3 F | BODY MASS INDEX: 31.73 KG/M2 | RESPIRATION RATE: 16 BRPM

## 2022-02-04 DIAGNOSIS — M45.6 ANKYLOSING SPONDYLITIS OF LUMBAR REGION (HCC): Primary | ICD-10-CM

## 2022-02-04 PROCEDURE — 99215 OFFICE O/P EST HI 40 MIN: CPT | Performed by: PEDIATRICS

## 2022-02-04 RX ORDER — ETANERCEPT 50 MG/ML
SOLUTION SUBCUTANEOUS
COMMUNITY
Start: 2022-01-20 | End: 2022-04-18 | Stop reason: SDUPTHER

## 2022-02-04 NOTE — PROGRESS NOTES
Chief Complaint   Patient presents with    Joint Pain     1. Have you been to the ER, urgent care clinic since your last visit? Hospitalized since your last visit? No    2. Have you seen or consulted any other health care providers outside of the [address] [place] [address] since your last visit? Include any pap smears or colon screening.  No

## 2022-02-04 NOTE — PROGRESS NOTES
RHEUMATOLOGY PROBLEM LIST AND CHIEF COMPLAINT  1. Ankylosing Spondylitis - inflammatory back pain, spinous process edema T12-L1     Immunosuppression Screening (5/07/2021): Quantiferon TB: negative  PPD:  Not performed  Hepatitis B: negative  Hepatitis C: negative    Therapy History includes:  Current NSAIDs include: ibuprofen 200 mg  Current DMARD therapy include: Enbrel Mini 50 mg every Saturday (9/05/2020 to present)  Prior DMARD therapy include: Humira 40 mg every 14 days (7/11/2020 to 7/28/2020), Enbrel 50 mg every Saturday (8/01/2020 to 8/26/2020)  Discontinued DMARDs because of inefficacy: None  Discontinued DMARDs because of side effects: Humira (nausea, vomiting, headache, injection site reaction), Enbrel SureClick (injection site reaction)    INTERVAL HISTORY  This is a 27 y.o.  female. Today, the patient complains of pain in the joints. Location: back   Severity: 0 on a scale of 0-10  Timing: all day     Duration: 5 months     Modifying factors:   Context/Associated signs and symptoms: The patient was previously followed by Dr. Martina Muñoz and will be switching to my care. Patient has been previously diagnosed with ankylosing spondylitis and is currently being treated with Enbrel weekly. Labs reviewed were overall unremarkable. Of note, patient underwent ligament reconstruction on right ankle in November 2021 from which she is still recovering. She states that she held Enbrel for 6 weeks prior to surgery and restarted this medication about one month ago. Noted that during this time she experienced recurrence of morning stiffness in her lower back. She states that this has begun to improve, but she continues to have minor back and neck pain. She reports random onset episodes of spasms of the fingers over her right hand. She states that her fingers lock up in the cold.  Mentions an injection for her neck pain about 3 months ago with Dr. Nilay Hendricks and plans to follow up in one month for a repeat injection. She reports hair loss and provides several photographs. RHEUMATOLOGY REVIEW OF SYSTEMS   Positives as per history  Negatives as follows:  Toshia Guadalupe:  Denies unexplained persistent fevers, weight change, chronic fatigue  HEAD/EYES:   Denies eye redness, blurry vision or sudden loss of vision, dry eyes, HA, temporal artery pain  ENT:    Denies oral/nasal ulcers, recurrent sinus infections, dry mouth  RESPIRATORY:  No pleuritic pain, history of pleural effusions, hemoptysis, exertional dyspnea  CARDIOVASCULAR: Denies chest pain, history of pericardial effusions  GASTRO:   Denies heartburn, esophageal dysmotility, abdominal pain, nausea, vomiting, diarrhea, blood in the stool  HEMATOLOGIC:  No easy bruising, purpura, swollen lymph nodes  SKIN:    Denies alopecia, ulcers, nodules, sun sensitivity, unexplained persistent rash   VASCULAR:   Denies edema, cyanosis, raynaud phenomenon  NEUROLOGIC:  Denies specific muscle weakness, paresthesias   PSYCHIATRIC:  No sleep disturbance / snoring, depression, anxiety  MSK:    No morning stiffness >1 hour, SI joint pain, persistent joint swelling, persistent joint pain    PAST MEDICAL HISTORY  Reviewed with patient, significant changes in medical history - no    FAMILY HISTORY  autoimmune thyroid disease and lupus - sister    PHYSICAL EXAM  Blood pressure 123/76, pulse (!) 107, temperature 98.3 °F (36.8 °C), temperature source Oral, resp. rate 16, weight 182 lb (82.6 kg), last menstrual period 01/30/2022, SpO2 98 %. GENERAL APPEARANCE: Well-nourished, no acute distress  EYES: No scleral erythema, conjunctival injection  ENT: No oral ulcer, parotid enlargement  NECK: No adenopathy, thyroid enlargement  CARDIOVASCULAR: Heart rhythm is regular. No murmur, rub, gallop  CHEST: Normal vesicular breath sounds. No wheezes, rales, pleural friction rubs  ABDOMINAL: The abdomen is soft and nontender.  Bowel sounds are normal  EXTREMITIES: There is no evidence of clubbing, cyanosis, edema  SKIN: No rash, palpable purpura, digital ulcer, abnormal thickening, normal nailfold capillaries   NEUROLOGICAL: Normal gait and station, full strength in upper and lower extremities,  normal sensation to light touch  MUSCULOSKELETAL:   Upper extremities - full range of motion, no tenderness, no swelling, no synovial thickening and no deformity of joints  Lower extremities - full range of motion, no tenderness, no swelling, no synovial thickening and no deformity of joints     LABS, RADIOLOGY AND PROCEDURES  Previous labs reviewed -Yes  Previous radiology reviewed -Yes    Radiographs    Entire Spine 11/06/2020: There is 22 degree dextroconvex scoliosis T12-L3. No vertebral body anomalies are seen on AP projection. Iliac crests are relatively equal in height. Chest 6/24/2020: Mild scoliosis of the thoracic spine noted. These reveal no acute fractures, dislocations or cardiopulmonary process noted. Left Knee 6/28/2017: The knee demonstrates anatomical alignment. The joint spaces are grossly preserved on this non-weightbearing study. No significant osteophyte formation is present. No acute fracture or suprapatellar joint effusion is identified. Pelvis 2/29/2016: no fracture or dislocation, lytic or blastic lesion of the pelvis. Scoliosis 3/16/2015: Convex right scoliosis T11-L3 measures 16 degrees. Only minimal rotational component. No vertebral body anomaly is seen. The left femoral head is 15 mm higher than the right compatible with a potentially significant leg length discrepancy. Risser stage V.    CT Imaging    CT Right Ankle without contrast 8/19/2019: Os navicularis measures 8 x 4 x 8 mm. A smooth, uniform soft tissue cleft is noted between this bone the remainder the navicular. Cortical margins are smooth. Bone island incidentally noted in the navicular. No acute fracture, coalition, concerning lytic or blastic lesion, degenerative change or alignment abnormality is evident.     CT Abdomen and Pelvis without contrast 4/16/2019: The lung bases are normal. The solid abdominal organs are normal within the limitations of a non-IV contrast exam. Neither kidney reveals evidence of definite calculi. The ureters are normal in caliber with no evidence of ureterolithiasis. The bowel is unopacified but normal in caliber. There is a normal appendix in the right lower quadrant. The uterus and ovaries are grossly normal. There is some trace fluid in the cul-de-sac. Bone windows are unremarkable. MR Imaging    MRI Lumbar Spine without contrast 5/06/2021: No overt syndesmophyte formation to suggest ankylosing spondylitis. Bone marrow signal intensity is normal. Vertebral body heights are preserved. The visualized cord is normal in caliber and signal intensity. The conus medullaris terminates at the L1 level. The paraspinal soft tissues are normal. T12-L1: No herniation or stenosis on sagittal images. L1-L2: No herniation or stenosis. L2-L3: No herniation or stenosis. L3-L4: No herniation or stenosis. L4-L5: No herniation or stenosis. L5-S1: No herniation or stenosis.     MRI Pelvis without contrast 5/06/2021: Bone marrow: within normal limits. No fracture, dislocation, or marrow replacing process. No evidence of stress reaction or periostitis. Joint fluid: Bilateral hip joint fluid is physiologic. Tendons: Intact. Muscles: Within normal limits. Neurovascular bundles: Within normal limits. Articular cartilage: intact. No osteochondral lesion. No sacroiliitis. Soft tissue mass: None. No acute process in the pelvis. Vaginal ring is in good position around the cervix. Urinary bladder and uterus are within normal limits. No adnexal mass. No lymphadenopathy. MRI Lumbar Spine without contrast 5/14/2020: There is mild bone marrow edema within the L1 spinous process and to a lesser extent within the T12 spinous process. No interspinous soft tissue edema is identified.  There is mild rightward curvature at the thoracolumbar junction. The vertebral body alignment is otherwise appropriate. The vertebral body heights are well-preserved. There is no pars defect. The overall bone marrow signal is within normal limits. There is no significant disc desiccation. The conus is identified at the L1 level. There is no abnormal signal within the distal spinal cord. At T12-L1, there is no significant spinal canal stenosis or neural foraminal narrowing. At L1-L2, there is no significant spinal canal stenosis or neural foraminal narrowing. At L2-L3, there is no significant spinal canal stenosis or neural foraminal narrowing. At L3-L4, there is no significant spinal canal stenosis. There is mild bilateral neural foraminal narrowing secondary to small foraminal disc bulges. At L4-L5, there is no significant spinal canal stenosis. There is mild bilateral neural foraminal narrowing secondary to small foraminal disc bulges. At L5-S1, there is no significant spinal canal stenosis or neural foraminal narrowing.     MRI Thoracic Spine without contrast 3/13/2020: Bone marrow: Bony vertebral elements have normal MR signal characteristics. Intervertebral disc spaces: The intervertebral disc spaces are normal for age. No herniated disc material is identified and the neuroforamina are patent. No canal stenosis. Cord: The cord is normal in contour and signal characteristics. Paraspinal soft tissues: Unremarkable. MRI Cervical Spine without contrast 1/24/2020: Alignment of the cervical spine is normal. Vertebral body height is well maintained. The craniocervical junction is normal. No abnormal signal is identified in the cervical or upper thoracic cord to indicate cord edema, myelomalacia or syrinx. C2/C3: Normal. C3/C4: Normal. C4/C5: Normal. C5/C6: Mild disc space narrowing is present with mild disc desiccation evident. There is no spinal stenosis or neural foraminal narrowing.  C6/C7: Normal. C7/T1: Normal.    MRI Lumbar Spine without contrast 9/19/2018: Bone marrow: Normal MRI signal characteristics. Conus/cauda equina: The conus ends at L1 and is normal in contour and signal characteristics. No abnormalities identified along the cauda equina. Intervertebral discs: T12-L1: Normal for age. L1-L2: Normal for age. L2-L3: Normal for age. L3-L4: Normal for age. L4-L5: Normal for age. L5-S1: Normal for age. Sacrum is normal in appearance without evidence of fracture. Visualized portions of the sacroiliac joints are unremarkable. Paraspinal soft tissues: Unremarkable. MRI Left Knee without contrast 4/25/2018: The menisci and cruciate ligaments remain intact. The MCL and the lateral collateral ligaments complex are unremarkable. The extensor mechanism is within normal limits. The TT-TG offset measures 17 mm. The distal iliotibial band images normally. There is no knee joint effusion or Baker's cyst. There is no medial plica. No focal cartilage defect is identified. The bone marrow signal is within normal limits. The regional soft tissues image normally. Previous procedures reviewed -Yes  Previous medical records reviewed/summarized -Yes    Nuclear Medicine    Triple Phase Bone Scan 4/28/2016: No prior imaging is available for comparison. There is slight scoliosis dextroconvex upper lumbar spine. Planar imaging demonstrates no focal areas of abnormal bony activity. SPECT imaging demonstrates no focal areas of abnormal bony activity    EMG/NCS    EMG/NCS 2/22/2019: normal and symmetrical response in sural, superficial peroneal SNAP, normal left peroneal and tibial motor conduction studies. Needle examination showed no evidence of active denervation, foot muscles were not activated fully. ASSESSMENT  1. Ankylosing Spondylitis - I suspect most of her discomfort described today is mechanical in nature. Recommended building strength in her back to prevent necessity for surgical intervention. She should continue to follow up with orthopedics as desired.  I recommend patient continue on Enbrel weekly. We will not attempt to taper this medication as she has worsened morning stiffness while off medication. Our goal in treating these diseases is to put the patient into remission before attempting to slowly taper the patient off medication. We discussed the likelihood of disease going into remission during pregnancy. Discussed the safety of Enbrel during pregnancy. I will order labs today including labs to check thyroid function which could be contributing to her hair loss. Follow up in 4-6 months. 2. Drug therapy monitoring for toxicity (biologic) - CBC, BUN, Cr, AST, ALT and albumin every 4 months    PLAN  1. Enbrel mini weekly   2. Check CBC, CMP, TSH, quantiferon-TB   3. Follow up in 4-6 months. Ana Lilia Coulter MD  Adult and Pediatric Rheumatology     [facility], [address] [place] [address], Phone [phone], Fax [phone]     Visiting  of Pediatrics    Department of Pediatrics, [address], Phone [phone], Fax [phone]    There are no Patient Instructions on file for this visit. cc:  Jerad Gupta DO    Written by darby Card, as dictated by Dr. Cathy Christianson M.D. Total time was 40 minutes, greater than 50% of which was spent in counseling and coordination of care. The diagnosis, treatment and various other items were discussed in detail: Test results, medication options, possible side effects, lifestyle changes.

## 2022-02-23 ENCOUNTER — TELEPHONE (OUTPATIENT)
Dept: CARDIOLOGY CLINIC | Age: 27
End: 2022-02-23

## 2022-02-23 ENCOUNTER — OFFICE VISIT (OUTPATIENT)
Dept: CARDIOLOGY CLINIC | Age: 27
End: 2022-02-23
Payer: COMMERCIAL

## 2022-02-23 ENCOUNTER — CLINICAL SUPPORT (OUTPATIENT)
Dept: CARDIOLOGY CLINIC | Age: 27
End: 2022-02-23

## 2022-02-23 VITALS
HEART RATE: 132 BPM | OXYGEN SATURATION: 99 % | RESPIRATION RATE: 14 BRPM | WEIGHT: 183.6 LBS | DIASTOLIC BLOOD PRESSURE: 86 MMHG | HEIGHT: 64 IN | BODY MASS INDEX: 31.34 KG/M2 | SYSTOLIC BLOOD PRESSURE: 122 MMHG

## 2022-02-23 DIAGNOSIS — E66.09 CLASS 1 OBESITY DUE TO EXCESS CALORIES WITHOUT SERIOUS COMORBIDITY WITH BODY MASS INDEX (BMI) OF 32.0 TO 32.9 IN ADULT: ICD-10-CM

## 2022-02-23 DIAGNOSIS — R00.0 TACHYCARDIA: Primary | ICD-10-CM

## 2022-02-23 DIAGNOSIS — R63.5 WEIGHT GAIN: ICD-10-CM

## 2022-02-23 DIAGNOSIS — R53.82 CHRONIC FATIGUE: ICD-10-CM

## 2022-02-23 DIAGNOSIS — R35.0 FREQUENT URINATION: ICD-10-CM

## 2022-02-23 PROCEDURE — 93225 XTRNL ECG REC<48 HRS REC: CPT | Performed by: STUDENT IN AN ORGANIZED HEALTH CARE EDUCATION/TRAINING PROGRAM

## 2022-02-23 PROCEDURE — 93000 ELECTROCARDIOGRAM COMPLETE: CPT | Performed by: NURSE PRACTITIONER

## 2022-02-23 PROCEDURE — 93227 XTRNL ECG REC<48 HR R&I: CPT | Performed by: STUDENT IN AN ORGANIZED HEALTH CARE EDUCATION/TRAINING PROGRAM

## 2022-02-23 PROCEDURE — 99214 OFFICE O/P EST MOD 30 MIN: CPT | Performed by: NURSE PRACTITIONER

## 2022-02-23 NOTE — PROGRESS NOTES
[facility] [address], [address] [facility], [address] [place] [address]    Office [phone]  Fax [phone]       Roberto Irby is a 27 y.o. female was last seen by Dr. Spring Hamilton for new patient evaluation 8/2021 for evaluation of tachycardia. Consult requested by Montrell Mccarthy DO    ER on 12/16/21:  \"Patient presents with chest pain concerning for PE. CTA was obtained after premedication. CTA shows no PE. Labs unremarkable. Patient reports history of autonomic instability and tachycardia\". Assessment/Recommendations:      ICD-10-CM ICD-9-CM    1. Tachycardia  R00.0 785.0 AMB POC EKG ROUTINE W/ 12 LEADS, INTER & REP      URINALYSIS W/ RFLX MICROSCOPIC      HCG QL SERUM      URINALYSIS W/ RFLX MICROSCOPIC      HCG QL SERUM   2. Weight gain  R63.5 783.1 URINALYSIS W/ RFLX MICROSCOPIC      HCG QL SERUM      URINALYSIS W/ RFLX MICROSCOPIC      HCG QL SERUM   3. Chronic fatigue  R53.82 780.79 URINALYSIS W/ RFLX MICROSCOPIC      HCG QL SERUM      URINALYSIS W/ RFLX MICROSCOPIC      HCG QL SERUM   4. Frequent urination  R35.0 788.41 URINALYSIS W/ RFLX MICROSCOPIC      HCG QL SERUM      URINALYSIS W/ RFLX MICROSCOPIC      HCG QL SERUM   5. Class 1 obesity due to excess calories without serious comorbidity with body mass index (BMI) of 32.0 to 32.9 in adult  E66.09 278.00     Z68.32 V85.32      Hx of Sinus tachycardia with symptoms of mild orthostasis and dysautonomia. Potential mild form of POTS - no formal CNS testing. No recent syncope. Recently exacerbation in tachycardia. EKG sinus tachycardia in 130's. Previous Echo 10/2021 was normal.    - Room for improvement in hydration and liberalization of sodium   - Encouraged knee or thigh high compression stockings.     - Check TSH, UA and screen for pregnancy today   - Consider referral to Neurology based on clinical coarse for further evaluation.    - Encouraged additional evaluation by PCP, pending clinical coarse and results of pending lab work. Obesity - BMI 32. Recent acceleration in weight gain; unknown cause. - Lab work, as above  - Encouraged hydration  - Food journal  - Regular exercise, once tachycardia further evaluated. History of ankylosing spondylitis - currently on immunosuppressive therapy. Followed by Rheumatology. Family history of Yudith-Danlos in her sister. She has a history of hypermobility but currently does not have the diagnosis of EDS. Phone/MyChart follow up to review lab results. Primary Care Physician- Jake Guerrero DO            Subjective:    Her primary compliant today is accelerated tachycardia that hasn't resolve with increase hydration. She also notes frequent hourly urination. No dysuria. No abdominal pain. No fever or chills. She is concerned that she has gained unintention weight and has started losing hair. TSH in July by PCP was normal.       She saw her Rheumatologist recently- labs are pending to check thyroid and checking LFT's due to ongoing use of Enbrel. She is concerned that she may be pregnant. A day late for her menses. Daily routine includes 2 electrolytes drinks per day. Eating more pretzels the past 2 weeks. No other new diagnosis, but does note an \"allergic reaction\" ~ 2 weeks ago  - started vomiting and had hives after seafood. Rash lasted for 4 days. Took benadryl. Developed near syncope following this but did not fully syncopize. She denies any exertional chest pain or dyspnea.        Past Medical History:   Diagnosis Date    Adverse effect of anesthesia     Patient stated she woke up \"In terror\" post op    Ankylosing spondylitis ([facility] [address] 06/2020    Asthma     Broken foot 07/2019    right foot, no surgery-wore boot/cast for 6 weeks    Chronic pain     generalized, ankle to back    Migraines     Muscle spasm     Pap smear for cervical cancer screening 11/10/2021    negative    Premature birth     at 29 weeks    Routine Papanicolaou smear 9/27/19 neg    Scoliosis     Tachycardia 09/2021    TMJ (temporomandibular joint syndrome)         Past Surgical History:   Procedure Laterality Date    HX KNEE ARTHROSCOPY Left 06/2018       Current Outpatient Medications:     EnbreL Mini 50 mg/mL (1 mL) crtg, , Disp: , Rfl:     albuterol (ProAir HFA) 90 mcg/actuation inhaler, Take 2 Puffs by inhalation every four (4) hours as needed for Wheezing., Disp: , Rfl:     SUMAtriptan (IMITREX) 50 mg tablet, sumatriptan 50 mg tablet  1 TABLET AT LEAST 2 HOURS BETWEEN DOSES AS NEEDED ORALLY TWICE A DAY 30 DAYS, Disp: , Rfl:     tiZANidine (ZANAFLEX) 4 mg capsule, Take 4 mg by mouth nightly., Disp: , Rfl:     levocetirizine (Xyzal) 5 mg tablet, Take 5 mg by mouth daily. , Disp: , Rfl:     montelukast (Singulair) 10 mg tablet, Take 10 mg by mouth daily. , Disp: , Rfl:     diphenhydrAMINE (BENADRYL) 2 % topical cream, Apply  to affected area as needed for Skin Irritation. , Disp: 30 g, Rfl: 3    Allergies   Allergen Reactions    Latex Hives    Shellfish Derived Anaphylaxis    Iodine Hives and Nausea and Vomiting    Other Medication Hives     Chloraprep    Pcn [Penicillins] Hives    Sulfa (Sulfonamide Antibiotics) Hives        Family History   Problem Relation Age of Onset    No Known Problems Mother     No Known Problems Father     Lupus Sister     Other Sister         common variable immune deficiency    Neuropathy Sister     Other Maternal Grandfather         CFH    Breast Cancer Paternal Grandmother     Breast Cancer Other     Thyroid Disease Sister        Social History     Tobacco Use    Smoking status: Never Smoker    Smokeless tobacco: Never Used   Substance Use Topics    Alcohol use: No    Drug use: No       Review of Symptoms:  Pertinent Positive: Lightheadedness, dizziness, nocturnal polyuria  Pertinent Negative: Chest pain, shortness of breath, orthopnea, PND  All Other systems reviewed and are negative for a Comprehensive ROS (10+)    Physical Exam    Blood pressure 122/86, pulse (!) 132, resp. rate 14, height 5' 3.5\" (1.613 m), weight 183 lb 9.6 oz (83.3 kg), last menstrual period 2022, SpO2 99 %. Wt Readings from Last 3 Encounters:   22 183 lb 9.6 oz (83.3 kg)   22 182 lb (82.6 kg)   21 165 lb (74.8 kg)       Constitutional:  well-developed and well-nourished. No distress. HENT: Normocephalic. Eyes: No scleral icterus. Neck:  Neck supple. No JVD present. Pulmonary/Chest: Effort normal and breath sounds normal. No respiratory distress, wheezes or rales. Cardiovascular: tachycardia, regular rhythm, S1 S2 . Exam reveals no gallop and no friction rub. No murmur heard. No edema. Extremities:  Normal muscle tone  Abdominal:   No abnormal distension. Neurological:  Moving all extremities, cranial nerves appear grossly intact. Skin: Skin is not cold. Not diaphoretic. No erythema. Psychiatric:  Grossly normal mood and affect. Intact insight. Objective Data:    EC2021normal sinus rhythm normal electrocardiogram     Labs from primary care   TSH 1.17  White count 10.5, hemoglobin 14.8, platelets 175  Sodium 138, potassium 4.5, BUN 14, creatinine 0.78, ALT 23, AST 17    ECHO ADULT COMPLETE 10/01/2021 10/1/2021    Interpretation Summary  · LV: Estimated LVEF is 60 - 65%. Visually measured ejection fraction. Normal cavity size, wall thickness, systolic function (ejection fraction normal) and diastolic function.     Signed by: Chano Vivas DO on 10/1/2021 10:02 AM      Frederick Bailey NP

## 2022-02-23 NOTE — TELEPHONE ENCOUNTER
Patient is calling because she has tachycardia and her heart rate is normally high but her apple watch gave her a reading of 40 and she feels like something is off and she doesn't fee well. Patient heart rate is 129 right now. This just started about an hour ago.     [phone]

## 2022-02-23 NOTE — PROGRESS NOTES
48 hour Holter monitor applied for diagnosis of tachycardia  per order of GABE Rodriguez NP , monitor # [id number] due back on 2/25/22 after 4:07 pm. Patient verbalizes understanding that she is to return the monitor to [facility] either [address] or  [address].

## 2022-02-24 ENCOUNTER — PATIENT MESSAGE (OUTPATIENT)
Dept: CARDIOLOGY CLINIC | Age: 27
End: 2022-02-24

## 2022-02-24 LAB
APPEARANCE UR: CLEAR
B-HCG SERPL QL: NEGATIVE MIU/ML
BACTERIA #/AREA URNS HPF: NORMAL /[HPF]
BILIRUB UR QL STRIP: NEGATIVE
CASTS URNS QL MICRO: NORMAL /LPF
COLOR UR: YELLOW
EPI CELLS #/AREA URNS HPF: NORMAL /HPF (ref 0–10)
GLUCOSE UR QL STRIP: NEGATIVE
HGB UR QL STRIP: NEGATIVE
KETONES UR QL STRIP: NEGATIVE
LEUKOCYTE ESTERASE UR QL STRIP: ABNORMAL
MICRO URNS: ABNORMAL
NITRITE UR QL STRIP: NEGATIVE
PH UR STRIP: 6 [PH] (ref 5–7.5)
PROT UR QL STRIP: NEGATIVE
RBC #/AREA URNS HPF: NORMAL /HPF (ref 0–2)
SP GR UR STRIP: 1.01 (ref 1–1.03)
UROBILINOGEN UR STRIP-MCNC: 0.2 MG/DL (ref 0.2–1)
WBC #/AREA URNS HPF: NORMAL /HPF (ref 0–5)

## 2022-02-26 LAB
ALBUMIN SERPL-MCNC: 4.8 G/DL (ref 3.9–5)
ALBUMIN/GLOB SERPL: 1.5 {RATIO} (ref 1.2–2.2)
ALP SERPL-CCNC: 110 IU/L (ref 44–121)
ALT SERPL-CCNC: 37 IU/L (ref 0–32)
AST SERPL-CCNC: 28 IU/L (ref 0–40)
BASOPHILS # BLD AUTO: 0.1 X10E3/UL (ref 0–0.2)
BASOPHILS NFR BLD AUTO: 1 %
BILIRUB SERPL-MCNC: 0.3 MG/DL (ref 0–1.2)
BUN SERPL-MCNC: 5 MG/DL (ref 6–20)
BUN/CREAT SERPL: 5 (ref 9–23)
CALCIUM SERPL-MCNC: 9.7 MG/DL (ref 8.7–10.2)
CHLORIDE SERPL-SCNC: 99 MMOL/L (ref 96–106)
CO2 SERPL-SCNC: 17 MMOL/L (ref 20–29)
CREAT SERPL-MCNC: 0.91 MG/DL (ref 0.57–1)
EOSINOPHIL # BLD AUTO: 0 X10E3/UL (ref 0–0.4)
EOSINOPHIL NFR BLD AUTO: 0 %
ERYTHROCYTE [DISTWIDTH] IN BLOOD BY AUTOMATED COUNT: 12.1 % (ref 11.7–15.4)
GAMMA INTERFERON BACKGROUND BLD IA-ACNC: 0.07 IU/ML
GLOBULIN SER CALC-MCNC: 3.1 G/DL (ref 1.5–4.5)
GLUCOSE SERPL-MCNC: 93 MG/DL (ref 65–99)
HCT VFR BLD AUTO: 44.3 % (ref 34–46.6)
HGB BLD-MCNC: 15.4 G/DL (ref 11.1–15.9)
IMM GRANULOCYTES # BLD AUTO: 0 X10E3/UL (ref 0–0.1)
IMM GRANULOCYTES NFR BLD AUTO: 0 %
LYMPHOCYTES # BLD AUTO: 0.5 X10E3/UL (ref 0.7–3.1)
LYMPHOCYTES NFR BLD AUTO: 5 %
M TB IFN-G BLD-IMP: NEGATIVE
M TB IFN-G CD4+ BCKGRND COR BLD-ACNC: 0.06 IU/ML
MCH RBC QN AUTO: 31.4 PG (ref 26.6–33)
MCHC RBC AUTO-ENTMCNC: 34.8 G/DL (ref 31.5–35.7)
MCV RBC AUTO: 90 FL (ref 79–97)
MITOGEN IGNF BLD-ACNC: 4.03 IU/ML
MONOCYTES # BLD AUTO: 0.7 X10E3/UL (ref 0.1–0.9)
MONOCYTES NFR BLD AUTO: 7 %
NEUTROPHILS # BLD AUTO: 7.6 X10E3/UL (ref 1.4–7)
NEUTROPHILS NFR BLD AUTO: 87 %
PLATELET # BLD AUTO: 274 X10E3/UL (ref 150–450)
POTASSIUM SERPL-SCNC: 3.9 MMOL/L (ref 3.5–5.2)
PROT SERPL-MCNC: 7.9 G/DL (ref 6–8.5)
QUANTIFERON INCUBATION, QF1T: NORMAL
QUANTIFERON TB2 AG: 0.08 IU/ML
RBC # BLD AUTO: 4.9 X10E6/UL (ref 3.77–5.28)
SERVICE CMNT-IMP: NORMAL
SODIUM SERPL-SCNC: 134 MMOL/L (ref 134–144)
TSH SERPL DL<=0.005 MIU/L-ACNC: 0.65 UIU/ML (ref 0.45–4.5)
WBC # BLD AUTO: 8.8 X10E3/UL (ref 3.4–10.8)

## 2022-03-03 PROBLEM — R00.0 TACHYCARDIA: Status: ACTIVE | Noted: 2021-09-01

## 2022-03-03 PROBLEM — E66.09 CLASS 1 OBESITY DUE TO EXCESS CALORIES WITHOUT SERIOUS COMORBIDITY WITH BODY MASS INDEX (BMI) OF 32.0 TO 32.9 IN ADULT: Status: ACTIVE | Noted: 2021-09-01

## 2022-03-04 ENCOUNTER — DOCUMENTATION ONLY (OUTPATIENT)
Dept: CARDIOLOGY CLINIC | Age: 27
End: 2022-03-04

## 2022-03-10 ENCOUNTER — OFFICE VISIT (OUTPATIENT)
Dept: CARDIOLOGY CLINIC | Age: 27
End: 2022-03-10
Payer: COMMERCIAL

## 2022-03-10 VITALS
DIASTOLIC BLOOD PRESSURE: 70 MMHG | HEIGHT: 64 IN | BODY MASS INDEX: 30.97 KG/M2 | OXYGEN SATURATION: 98 % | SYSTOLIC BLOOD PRESSURE: 110 MMHG | WEIGHT: 181.4 LBS | HEART RATE: 99 BPM

## 2022-03-10 DIAGNOSIS — R00.0 TACHYCARDIA: Primary | ICD-10-CM

## 2022-03-10 DIAGNOSIS — R00.2 PALPITATIONS: ICD-10-CM

## 2022-03-10 PROCEDURE — 99214 OFFICE O/P EST MOD 30 MIN: CPT | Performed by: STUDENT IN AN ORGANIZED HEALTH CARE EDUCATION/TRAINING PROGRAM

## 2022-03-10 RX ORDER — FLUTICASONE PROPIONATE AND SALMETEROL 250; 50 UG/1; UG/1
POWDER RESPIRATORY (INHALATION)
COMMUNITY
Start: 2022-03-01

## 2022-03-10 NOTE — PROGRESS NOTES
Chief Complaint   Patient presents with    Follow-up    Irregular Heart Beat    Fatigue       Visit Vitals  /70 (BP 1 Location: Left upper arm, BP Patient Position: Sitting)   Pulse 99   Ht 5' 3.5\" (1.613 m)   Wt 181 lb 6.4 oz (82.3 kg)   SpO2 98%   BMI 31.63 kg/m²       Chest pain denied  SOB - denied  Dizziness denied  Swelling/Edema - denied  Recent hospital visit denied  Refills denied

## 2022-03-10 NOTE — PROGRESS NOTES
[facility] [address], [address] [facility], [address] [place] [address]    Office [phone],QBX [phone]           Devon Isaac is a 27 y.o. female presents the office for evaluation of tachycardia. Consult requested by Sharol Lombard, DO  . Assessment/Recommendations:      ICD-10-CM ICD-9-CM    1. Tachycardia  R00.0 785.0    2. Palpitations  R00.2 785.1        Palpitations, sinus tachycardia  -Commend patient obtain an [employer] Kardia and monitor her heart rhythm with symptoms of palpitations. History of ankylosing spondylitis currently on immunosuppressive therapy    Family history of Yudith-Danlos in her sister. She has a history of hypermobility but currently does not have the diagnosis of EDS. Primary Care Physician- Sharol Lombard, DO    Follow-up 6 months    Subjective:  27 y.o. presents the office for follow-up evaluation. She reports she still having episodes of palpitations. Event monitor did not show evidence of any significant abnormal heart rhythms. She had one PAC during the entire monitoring period. No ongoing chest pain or chest pressure symptoms. No exertional dyspnea symptoms. No lightheadedness or dizziness. No syncope or near syncopal episodes. Echocardiogram shows normal LV function without any valvular pathology.     Past Medical History:   Diagnosis Date    Adverse effect of anesthesia     Patient stated she woke up \"In terror\" post op    Ankylosing spondylitis ([facility] [address] 06/2020    Asthma     Broken foot 07/2019    right foot, no surgery-wore boot/cast for 6 weeks    Chronic pain     generalized, ankle to back    Migraines     Muscle spasm     Pap smear for cervical cancer screening 11/10/2021    negative    Premature birth     at 29 weeks    Routine Papanicolaou smear 9/27/19 neg    Scoliosis     Tachycardia 09/2021    TMJ (temporomandibular joint syndrome)         Past Surgical History:   Procedure Laterality Date    HX KNEE ARTHROSCOPY Left 06/2018         Current Outpatient Medications:     fluticasone propion-salmeteroL (Advair Diskus) 250-50 mcg/dose diskus inhaler, Advair Diskus 250 mcg-50 mcg/dose powder for inhalation  Inhale 1 puff twice a day by inhalation route., Disp: , Rfl:     EnbreL Mini 50 mg/mL (1 mL) crtg, every seven (7) days. , Disp: , Rfl:     albuterol (ProAir HFA) 90 mcg/actuation inhaler, Take 2 Puffs by inhalation every four (4) hours as needed for Wheezing., Disp: , Rfl:     SUMAtriptan (IMITREX) 50 mg tablet, sumatriptan 50 mg tablet  1 TABLET AT LEAST 2 HOURS BETWEEN DOSES AS NEEDED ORALLY TWICE A DAY 30 DAYS, Disp: , Rfl:     tiZANidine (ZANAFLEX) 4 mg capsule, Take 4 mg by mouth nightly., Disp: , Rfl:     levocetirizine (Xyzal) 5 mg tablet, Take 5 mg by mouth daily. , Disp: , Rfl:     montelukast (Singulair) 10 mg tablet, Take 10 mg by mouth daily. , Disp: , Rfl:     diphenhydrAMINE (BENADRYL) 2 % topical cream, Apply  to affected area as needed for Skin Irritation. , Disp: 30 g, Rfl: 3    Allergies   Allergen Reactions    Latex Hives    Shellfish Derived Anaphylaxis    Iodine Hives and Nausea and Vomiting    Other Medication Hives     Chloraprep    Pcn [Penicillins] Hives    Sulfa (Sulfonamide Antibiotics) Hives        Family History   Problem Relation Age of Onset    No Known Problems Mother     No Known Problems Father     Lupus Sister     Other Sister         common variable immune deficiency    Neuropathy Sister     Other Maternal Grandfather         CFH    Breast Cancer Paternal Grandmother     Breast Cancer Other     Thyroid Disease Sister        Social History     Tobacco Use    Smoking status: Never Smoker    Smokeless tobacco: Never Used   Substance Use Topics    Alcohol use: No    Drug use: No       Review of Symptoms:  Pertinent Positive: Lightheadedness, dizziness, nocturnal polyuria  Pertinent Negative: Chest pain, shortness of breath, orthopnea, PND  All Other systems reviewed and are negative for a Comprehensive ROS (10+)    Physical Exam    Blood pressure 110/70, pulse 99, height 5' 3.5\" (1.613 m), weight 181 lb 6.4 oz (82.3 kg), SpO2 98 %. Constitutional:  well-developed and well-nourished. No distress. HENT: Normocephalic. Eyes: No scleral icterus. Neck:  Neck supple. No JVD present. Pulmonary/Chest: Effort normal and breath sounds normal. No respiratory distress, wheezes or rales. Cardiovascular: Normal rate, regular rhythm, S1 S2 . Exam reveals no gallop and no friction rub. No murmur heard. No edema. Extremities:  Normal muscle tone  Abdominal:   No abnormal distension. Neurological:  Moving all extremities, cranial nerves appear grossly intact. Skin: Skin is not cold. Not diaphoretic. No erythema. Psychiatric:  Grossly normal mood and affect. Intact insight. Objective Data: Investigations personally reviewed and interpreted    EC2021-normal sinus rhythm normal electrocardiogram          Investigations reviewed     Labs from primary care   TSH 1.17  White count 10.5, hemoglobin 14.8, platelets 799  Sodium 138, potassium 4.5, BUN 14, creatinine 0.78, ALT 23, AST 17      Holter monitor -22    1 PAC in 48 hrs    21    ECHO ADULT COMPLETE 10/01/2021 10/1/2021    Interpretation Summary  · LV: Estimated LVEF is 60 - 65%. Visually measured ejection fraction. Normal cavity size, wall thickness, systolic function (ejection fraction normal) and diastolic function. Signed by: Rand Jackson DO on 10/1/2021 10:02 AM        Nelda De Jesus DO          ATTENTION:   This medical record was transcribed using an electronic medical records/speech recognition system. Although proofread, it may and can contain electronic, spelling and other errors. Corrections may be executed at a later time. Please feel free to contact us for any clarifications as needed.

## 2022-03-10 NOTE — LETTER
3/11/2022    Patient: Micheal Cm   YOB: 1995   Date of Visit: 3/10/2022     Aw[address], [address] [facility] [address] [facility] [address]  Via Fax: [phone]    Dear Fitz Barber DO,      Thank you for referring Ms. Micheal Cm to [address] for evaluation. My notes for this consultation are attached. If you have questions, please do not hesitate to call me. I look forward to following your patient along with you.       Sincerely,    Amado Quinonez DO

## 2022-03-18 PROBLEM — Z79.60 LONG-TERM USE OF IMMUNOSUPPRESSANT MEDICATION: Status: ACTIVE | Noted: 2020-06-25

## 2022-03-19 PROBLEM — M45.6 ANKYLOSING SPONDYLITIS OF LUMBAR REGION (HCC): Status: ACTIVE | Noted: 2020-06-25

## 2022-03-19 PROBLEM — R00.0 TACHYCARDIA: Status: ACTIVE | Noted: 2021-09-01

## 2022-03-19 PROBLEM — M45.9 ANKYLOSING SPONDYLITIS (HCC): Status: ACTIVE | Noted: 2020-06-01

## 2022-03-20 PROBLEM — E66.811 CLASS 1 OBESITY DUE TO EXCESS CALORIES WITHOUT SERIOUS COMORBIDITY WITH BODY MASS INDEX (BMI) OF 32.0 TO 32.9 IN ADULT: Status: ACTIVE | Noted: 2021-09-01

## 2022-03-20 PROBLEM — E66.09 CLASS 1 OBESITY DUE TO EXCESS CALORIES WITHOUT SERIOUS COMORBIDITY WITH BODY MASS INDEX (BMI) OF 32.0 TO 32.9 IN ADULT: Status: ACTIVE | Noted: 2021-09-01

## 2022-04-18 RX ORDER — ETANERCEPT 50 MG/ML
50 SOLUTION SUBCUTANEOUS
Qty: 4 ML | Refills: 3 | Status: SHIPPED | OUTPATIENT
Start: 2022-04-18 | End: 2022-04-19 | Stop reason: SDUPTHER

## 2022-04-19 ENCOUNTER — TELEPHONE (OUTPATIENT)
Dept: OBGYN CLINIC | Age: 27
End: 2022-04-19

## 2022-04-19 RX ORDER — ETANERCEPT 50 MG/ML
50 SOLUTION SUBCUTANEOUS
Qty: 4 ML | Refills: 3 | Status: SHIPPED | OUTPATIENT
Start: 2022-04-19 | End: 2022-10-31 | Stop reason: SDUPTHER

## 2022-04-19 NOTE — TELEPHONE ENCOUNTER
TP gyn pt calling regarding pre pregnancy MFM referral.    Pt tried getting pregnant this month & her cycle is due around 5/2/22, pt unsure if she is pregnant & wants to know before her appt with MFM. Referral placed 1/21/22. Pt reports she wanted to have a consult with [facility] regarding her medical hx & medications. Pt reports she has weaned off some meds that she thinks are unsafe. Pt reports her MFM appt is next week, but she wants to cancel it until she knows if she is pregnant this month. Advised pt to inform us with a pos UPT if she gets one. Advised pt to schedule appt with MFM when she feels comfortable to do so. Advised pt Dr. Liat Kennedy would see her for her EOB with ultrasound when she is about 8wks pregnant based on LMP. Pt plans to cancel her MFM & inform us of UPT in the next few weeks. No further questions.

## 2022-06-29 ENCOUNTER — HOSPITAL ENCOUNTER (EMERGENCY)
Age: 27
Discharge: HOME | End: 2022-06-29
Attending: EMERGENCY MEDICINE
Payer: COMMERCIAL

## 2022-06-29 ENCOUNTER — APPOINTMENT (OUTPATIENT)
Dept: GENERAL RADIOLOGY | Age: 27
End: 2022-06-29
Attending: EMERGENCY MEDICINE
Payer: COMMERCIAL

## 2022-06-29 VITALS
HEIGHT: 63 IN | HEART RATE: 93 BPM | RESPIRATION RATE: 17 BRPM | DIASTOLIC BLOOD PRESSURE: 52 MMHG | OXYGEN SATURATION: 99 % | TEMPERATURE: 98.3 F | WEIGHT: 186.95 LBS | BODY MASS INDEX: 33.12 KG/M2 | SYSTOLIC BLOOD PRESSURE: 112 MMHG

## 2022-06-29 DIAGNOSIS — J45.40 MODERATE PERSISTENT ASTHMA WITHOUT COMPLICATION: Primary | ICD-10-CM

## 2022-06-29 LAB
ALBUMIN SERPL-MCNC: 4.1 G/DL (ref 3.5–5)
ALBUMIN/GLOB SERPL: 1.1 {RATIO} (ref 1.1–2.2)
ALP SERPL-CCNC: 105 U/L (ref 45–117)
ALT SERPL-CCNC: 38 U/L (ref 12–78)
ANION GAP SERPL CALC-SCNC: 11 MMOL/L (ref 5–15)
AST SERPL-CCNC: 19 U/L (ref 15–37)
BASOPHILS # BLD: 0 K/UL (ref 0–0.1)
BASOPHILS NFR BLD: 0 % (ref 0–1)
BILIRUB SERPL-MCNC: 0.4 MG/DL (ref 0.2–1)
BNP SERPL-MCNC: 52 PG/ML (ref 0–125)
BUN SERPL-MCNC: 12 MG/DL (ref 6–20)
BUN/CREAT SERPL: 13 (ref 12–20)
CALCIUM SERPL-MCNC: 9.1 MG/DL (ref 8.5–10.1)
CHLORIDE SERPL-SCNC: 104 MMOL/L (ref 97–108)
CO2 SERPL-SCNC: 22 MMOL/L (ref 21–32)
CREAT SERPL-MCNC: 0.95 MG/DL (ref 0.55–1.02)
D DIMER PPP FEU-MCNC: 0.38 MG/L FEU (ref 0–0.65)
DIFFERENTIAL METHOD BLD: ABNORMAL
EOSINOPHIL # BLD: 0.1 K/UL (ref 0–0.4)
EOSINOPHIL NFR BLD: 1 % (ref 0–7)
ERYTHROCYTE [DISTWIDTH] IN BLOOD BY AUTOMATED COUNT: 12.1 % (ref 11.5–14.5)
GLOBULIN SER CALC-MCNC: 3.7 G/DL (ref 2–4)
GLUCOSE SERPL-MCNC: 100 MG/DL (ref 65–100)
HCT VFR BLD AUTO: 40.8 % (ref 35–47)
HGB BLD-MCNC: 14.3 G/DL (ref 11.5–16)
IMM GRANULOCYTES # BLD AUTO: 0 K/UL (ref 0–0.04)
IMM GRANULOCYTES NFR BLD AUTO: 0 % (ref 0–0.5)
LYMPHOCYTES # BLD: 1.6 K/UL (ref 0.8–3.5)
LYMPHOCYTES NFR BLD: 15 % (ref 12–49)
MCH RBC QN AUTO: 31.6 PG (ref 26–34)
MCHC RBC AUTO-ENTMCNC: 35 G/DL (ref 30–36.5)
MCV RBC AUTO: 90.3 FL (ref 80–99)
MONOCYTES # BLD: 0.7 K/UL (ref 0–1)
MONOCYTES NFR BLD: 6 % (ref 5–13)
NEUTS SEG # BLD: 8.6 K/UL (ref 1.8–8)
NEUTS SEG NFR BLD: 77 % (ref 32–75)
NRBC # BLD: 0 K/UL (ref 0–0.01)
NRBC BLD-RTO: 0 PER 100 WBC
PLATELET # BLD AUTO: 272 K/UL (ref 150–400)
PMV BLD AUTO: 10.1 FL (ref 8.9–12.9)
POTASSIUM SERPL-SCNC: 3.8 MMOL/L (ref 3.5–5.1)
PROT SERPL-MCNC: 7.8 G/DL (ref 6.4–8.2)
RBC # BLD AUTO: 4.52 M/UL (ref 3.8–5.2)
SODIUM SERPL-SCNC: 137 MMOL/L (ref 136–145)
TROPONIN-HIGH SENSITIVITY: 6 NG/L (ref 0–51)
WBC # BLD AUTO: 11.1 K/UL (ref 3.6–11)

## 2022-06-29 PROCEDURE — 99285 EMERGENCY DEPT VISIT HI MDM: CPT

## 2022-06-29 PROCEDURE — 77030013140 HC MSK NEB VYRM -A

## 2022-06-29 PROCEDURE — 71045 X-RAY EXAM CHEST 1 VIEW: CPT

## 2022-06-29 PROCEDURE — 85379 FIBRIN DEGRADATION QUANT: CPT

## 2022-06-29 PROCEDURE — 74011250636 HC RX REV CODE- 250/636: Performed by: EMERGENCY MEDICINE

## 2022-06-29 PROCEDURE — 36415 COLL VENOUS BLD VENIPUNCTURE: CPT

## 2022-06-29 PROCEDURE — 74011000250 HC RX REV CODE- 250: Performed by: EMERGENCY MEDICINE

## 2022-06-29 PROCEDURE — 84484 ASSAY OF TROPONIN QUANT: CPT

## 2022-06-29 PROCEDURE — 83880 ASSAY OF NATRIURETIC PEPTIDE: CPT

## 2022-06-29 PROCEDURE — 74011250637 HC RX REV CODE- 250/637: Performed by: EMERGENCY MEDICINE

## 2022-06-29 PROCEDURE — 85025 COMPLETE CBC W/AUTO DIFF WBC: CPT

## 2022-06-29 PROCEDURE — 2709999900 HC NON-CHARGEABLE SUPPLY

## 2022-06-29 PROCEDURE — 93005 ELECTROCARDIOGRAM TRACING: CPT

## 2022-06-29 PROCEDURE — 94640 AIRWAY INHALATION TREATMENT: CPT

## 2022-06-29 PROCEDURE — 80053 COMPREHEN METABOLIC PANEL: CPT

## 2022-06-29 PROCEDURE — 96374 THER/PROPH/DIAG INJ IV PUSH: CPT

## 2022-06-29 RX ORDER — DOXYCYCLINE HYCLATE 100 MG
100 TABLET ORAL 2 TIMES DAILY
Qty: 14 TABLET | Refills: 0 | Status: SHIPPED | OUTPATIENT
Start: 2022-06-29 | End: 2022-07-06

## 2022-06-29 RX ORDER — PREDNISONE 10 MG/1
TABLET ORAL
Qty: 1 DOSE PACK | Refills: 0 | Status: SHIPPED | OUTPATIENT
Start: 2022-06-29 | End: 2022-07-11 | Stop reason: ALTCHOICE

## 2022-06-29 RX ORDER — ACETAMINOPHEN 500 MG
1000 TABLET ORAL
Status: COMPLETED | OUTPATIENT
Start: 2022-06-29 | End: 2022-06-29

## 2022-06-29 RX ADMIN — ACETAMINOPHEN 1000 MG: 500 TABLET ORAL at 18:14

## 2022-06-29 RX ADMIN — ALBUTEROL SULFATE 1 DOSE: 2.5 SOLUTION RESPIRATORY (INHALATION) at 17:50

## 2022-06-29 RX ADMIN — METHYLPREDNISOLONE SODIUM SUCCINATE 125 MG: 125 INJECTION, POWDER, FOR SOLUTION INTRAMUSCULAR; INTRAVENOUS at 17:50

## 2022-06-29 NOTE — ED PROVIDER NOTES
HPI   The patient is a 82-year-old white female with a history of acute onset of asthma in the past developed shortness of breath a cough but no fever and chills and no upper respiratory infection symptoms. She was seen by her PCP about 2 hours ago who sent her here for further work-up. She has a history of asthma chronic pain tachycardia.   She is not visibly wheezing at this time and her lungs were essentially clear except for a few wheezes noted  Past Medical History:   Diagnosis Date    Adverse effect of anesthesia     Patient stated she woke up \"In terror\" post op    Ankylosing spondylitis ([address] 06/2020    Asthma     Broken foot 07/2019    right foot, no surgery-wore boot/cast for 6 weeks    Chronic pain     generalized, ankle to back    Migraines     Muscle spasm     Pap smear for cervical cancer screening 11/10/2021    negative    Premature birth     at 29 weeks    Routine Papanicolaou smear 9/27/19 neg    Scoliosis     Tachycardia 09/2021    TMJ (temporomandibular joint syndrome)        Past Surgical History:   Procedure Laterality Date    HX KNEE ARTHROSCOPY Left 06/2018         Family History:   Problem Relation Age of Onset    No Known Problems Mother     No Known Problems Father     Lupus Sister     Other Sister         common variable immune deficiency    Neuropathy Sister     Other Maternal Grandfather         CFH    Breast Cancer Paternal Grandmother     Breast Cancer Other     Thyroid Disease Sister        Social History     Socioeconomic History    Marital status:      Spouse name: Not on file    Number of children: Not on file    Years of education: Not on file    Highest education level: Not on file   Occupational History     Comment: works at a pharmacy   Tobacco Use    Smoking status: Never Smoker    Smokeless tobacco: Never Used   Substance and Sexual Activity    Alcohol use: No    Drug use: No    Sexual activity: Yes     Partners: Male     Birth control/protection: Condom   Other Topics Concern    Not on file   Social History Narrative    Not on file     Social Determinants of Health     Financial Resource Strain:     Difficulty of Paying Living Expenses: Not on file   Food Insecurity:     Worried About Running Out of Food in the Last Year: Not on file    Ela of Food in the Last Year: Not on file   Transportation Needs:     Lack of Transportation (Medical): Not on file    Lack of Transportation (Non-Medical): Not on file   Physical Activity:     Days of Exercise per Week: Not on file    Minutes of Exercise per Session: Not on file   Stress:     Feeling of Stress : Not on file   Social Connections:     Frequency of Communication with Friends and Family: Not on file    Frequency of Social Gatherings with Friends and Family: Not on file    Attends Church Services: Not on file    Active Member of [address] [place] [address] [employer] or Organizations: Not on file    Attends Club or Organization Meetings: Not on file    Marital Status: Not on file   Intimate Partner Violence:     Fear of Current or Ex-Partner: Not on file    Emotionally Abused: Not on file    Physically Abused: Not on file    Sexually Abused: Not on file   Housing Stability:     Unable to Pay for Housing in the Last Year: Not on file    Number of [place] in the Last Year: Not on file    Unstable Housing in the Last Year: Not on file         ALLERGIES: Latex, Shellfish derived, Iodine, Other medication, Pcn [penicillins], Sulfa (sulfonamide antibiotics), and Zithromax [azithromycin]    Review of Systems   All other systems reviewed and are negative. Vitals:    06/29/22 1609 06/29/22 1612 06/29/22 1623   BP: 130/70 137/68    Pulse: (!) 112     Resp: 16     Temp: 98.3 °F (36.8 °C)     SpO2: 97% 97% 97%   Weight: 84.8 kg (186 lb 15.2 oz)     Height: 5' 3\" (1.6 m)              Physical Exam  Vitals reviewed. HENT:      Head: Normocephalic.    Eyes:      Pupils: Pupils are equal, round, and reactive to light. Cardiovascular:      Rate and Rhythm: Normal rate. Pulmonary:      Effort: Pulmonary effort is normal.   Musculoskeletal:         General: Normal range of motion. Skin:     General: Skin is warm. Capillary Refill: Capillary refill takes less than 2 seconds. Neurological:      General: No focal deficit present. Mental Status: She is alert. She is disoriented. Psychiatric:         Mood and Affect: Mood normal.          MDM  Number of Diagnoses or Management Options     Amount and/or Complexity of Data Reviewed  Clinical lab tests: ordered and reviewed  Tests in the radiology section of CPT®: ordered and reviewed  Tests in the medicine section of CPT®: ordered and reviewed    Risk of Complications, Morbidity, and/or Mortality  Presenting problems: moderate  Diagnostic procedures: moderate  Management options: moderate           Procedures      Assessment and plan    I believe the patient has an acute asthma attack with some bronchitis perhaps she declined a COVID test at this time and she could have COVID.   Chest x-ray is clear pulse ox's were good dimer was normal will discharge home with close follow-up by her PCP

## 2022-06-29 NOTE — ED TRIAGE NOTES
Pt reports waking up around midnight with chest pain and shortness of breath, states she went to PCP who sent her here, pt has hx of asthma, states she used inhalers with no relief.

## 2022-06-29 NOTE — ED NOTES
Pt discharged home, verbalized understanding of discharge instructions and prescriptions, pt ambulated out of department with steady gait.

## 2022-07-01 LAB
ATRIAL RATE: 110 BPM
CALCULATED P AXIS, ECG09: 21 DEGREES
CALCULATED R AXIS, ECG10: 26 DEGREES
CALCULATED T AXIS, ECG11: 22 DEGREES
DIAGNOSIS, 93000: NORMAL
P-R INTERVAL, ECG05: 142 MS
Q-T INTERVAL, ECG07: 336 MS
QRS DURATION, ECG06: 70 MS
QTC CALCULATION (BEZET), ECG08: 454 MS
VENTRICULAR RATE, ECG03: 110 BPM

## 2022-07-11 ENCOUNTER — OFFICE VISIT (OUTPATIENT)
Dept: RHEUMATOLOGY | Age: 27
End: 2022-07-11
Payer: COMMERCIAL

## 2022-07-11 VITALS
BODY MASS INDEX: 33.13 KG/M2 | HEART RATE: 98 BPM | WEIGHT: 187 LBS | HEIGHT: 63 IN | DIASTOLIC BLOOD PRESSURE: 78 MMHG | SYSTOLIC BLOOD PRESSURE: 121 MMHG | TEMPERATURE: 98.5 F | RESPIRATION RATE: 18 BRPM

## 2022-07-11 DIAGNOSIS — M45.6 ANKYLOSING SPONDYLITIS OF LUMBAR REGION (HCC): Primary | ICD-10-CM

## 2022-07-11 PROCEDURE — 99214 OFFICE O/P EST MOD 30 MIN: CPT | Performed by: PEDIATRICS

## 2022-07-11 RX ORDER — HYDROXYCHLOROQUINE SULFATE 200 MG/1
200 TABLET, FILM COATED ORAL DAILY
Qty: 30 TABLET | Refills: 3 | Status: SHIPPED | OUTPATIENT
Start: 2022-07-11 | End: 2022-10-31 | Stop reason: SDUPTHER

## 2022-07-11 RX ORDER — HYDROXYCHLOROQUINE SULFATE 200 MG/1
200 TABLET, FILM COATED ORAL DAILY
Qty: 30 TABLET | Refills: 3 | Status: SHIPPED | OUTPATIENT
Start: 2022-07-11 | End: 2022-07-11 | Stop reason: SDUPTHER

## 2022-07-11 NOTE — PROGRESS NOTES
RHEUMATOLOGY PROBLEM LIST AND CHIEF COMPLAINT  1. Ankylosing Spondylitis - inflammatory back pain, spinous process edema T12-L1     Immunosuppression Screening (5/07/2021): Quantiferon TB: negative  PPD:  Not performed  Hepatitis B: negative  Hepatitis C: negative    Therapy History includes:  Current NSAIDs include: ibuprofen 200 mg  Current DMARD therapy include: Enbrel Mini 50 mg every Saturday (9/05/2020 to present)  Prior DMARD therapy include: Humira 40 mg every 14 days (7/11/2020 to 7/28/2020), Enbrel 50 mg every Saturday (8/01/2020 to 8/26/2020)  Discontinued DMARDs because of inefficacy: None  Discontinued DMARDs because of side effects: Humira (nausea, vomiting, headache, injection site reaction), Enbrel SureClick (injection site reaction)    INTERVAL HISTORY  This is a 27 y.o.  female. Today, the patient complains of pain in the joints. Location: back   Severity: 0 on a scale of 0-10  Timing: all day     Duration: 5 months     Modifying factors:   Context/Associated signs and symptoms:  The patient has been doing well but has stiffness in the neck and lower back. The patient has been taking enbrel 50mg every week and has no symptoms that worsen leading up to the next shot. She was without enbrel for 10 days and did have worse symptoms. She takes NSAIDs occasionally and it helps but she has reflux. She can not add MTX or arava because of family planning. She is not able SZA because of sulfa allergy.       RHEUMATOLOGY REVIEW OF SYSTEMS   Positives as per history  Negatives as follows:  Richad Gearing:  Denies unexplained persistent fevers, weight change, chronic fatigue  HEAD/EYES:   Denies eye redness, blurry vision or sudden loss of vision, dry eyes, HA, temporal artery pain  ENT:    Denies oral/nasal ulcers, recurrent sinus infections, dry mouth  RESPIRATORY:  No pleuritic pain, history of pleural effusions, hemoptysis, exertional dyspnea  CARDIOVASCULAR: Denies chest pain, history of pericardial effusions  GASTRO:   Denies heartburn, esophageal dysmotility, abdominal pain, nausea, vomiting, diarrhea, blood in the stool  HEMATOLOGIC:  No easy bruising, purpura, swollen lymph nodes  SKIN:    Denies alopecia, ulcers, nodules, sun sensitivity, unexplained persistent rash   VASCULAR:   Denies edema, cyanosis, raynaud phenomenon  NEUROLOGIC:  Denies specific muscle weakness, paresthesias   PSYCHIATRIC:  No sleep disturbance / snoring, depression, anxiety  MSK:    No morning stiffness >1 hour, SI joint pain, persistent joint swelling, persistent joint pain    PAST MEDICAL HISTORY  Reviewed with patient, significant changes in medical history - no    FAMILY HISTORY  autoimmune thyroid disease and lupus - sister    PHYSICAL EXAM  Blood pressure 121/78, pulse 98, temperature 98.5 °F (36.9 °C), resp. rate 18, height 5' 3\" (1.6 m), weight 187 lb (84.8 kg), last menstrual period 06/29/2022. GENERAL APPEARANCE: Well-nourished, no acute distress  EYES: No scleral erythema, conjunctival injection  ENT: No oral ulcer, parotid enlargement  NECK: No adenopathy, thyroid enlargement  CARDIOVASCULAR: Heart rhythm is regular. No murmur, rub, gallop  CHEST: Normal vesicular breath sounds. No wheezes, rales, pleural friction rubs  ABDOMINAL: The abdomen is soft and nontender.  Bowel sounds are normal  EXTREMITIES: There is no evidence of clubbing, cyanosis, edema  SKIN: No rash, palpable purpura, digital ulcer, abnormal thickening, normal nailfold capillaries   NEUROLOGICAL: Normal gait and station, full strength in upper and lower extremities,  normal sensation to light touch  MUSCULOSKELETAL: positive triny's and pain in the SI joint and neck  Upper extremities - full range of motion, no tenderness, no swelling, no synovial thickening and no deformity of joints  Lower extremities - full range of motion, no tenderness, no swelling, no synovial thickening and no deformity of joints     LABS, RADIOLOGY AND PROCEDURES  Previous labs reviewed -Yes  Previous radiology reviewed -Yes  Previous procedures reviewed -Yes  Previous medical records reviewed/summarized -Yes    ASSESSMENT  1. Ankylosing Spondylitis - The symptoms are likely from AS as there is stiffness in the morning. We discussed the patient's symptoms are likely to improve with Plaquenil in omkar to the enbrel. Some of the neck pain is from MSK pain in her upper back; tense muscles noted on exam  2. New medication - Plaquenil - A written summary, as prepared by the [employer] of Rheumatology was provided. The patient was given the opportunity to ask questions, and verbalized understanding of the following: The medication may not improve symptoms for 1-2 months, but it may take up to 6 months before full benefits are experienced. It is very well tolerated, and serious side effects are rare. Common side effects were discussed; nausea and diarrhea, which can improve by taking with food. Less common side effects that were discussed; skin rashes, changes in skin pigment, hair changes, anemia and weakness. Visual changes or loss of vision are also rare; we have dosed accordingly and she will have yearly eye exams. 3. Drug therapy monitoring for toxicity (biologic) - CBC, BUN, Cr, AST, ALT and albumin every 4 months    PLAN  1. Enbrel mini weekly   2. Start plaquenil 200mg daily. 3.  Follow up in 4 months. Ana Lilia Kaur MD  Adult and Pediatric Rheumatology     [facility], [address] [place] [address], Phone [phone], Fax [phone]     Visiting  of Pediatrics    Department of Pediatrics, [facility] of [address] [place] [address], [address] [place] [address], Phone [phone], Fax [phone]    There are no Patient Instructions on file for this visit.     cc:  Allison Richards,

## 2022-09-15 ENCOUNTER — OFFICE VISIT (OUTPATIENT)
Dept: CARDIOLOGY CLINIC | Age: 27
End: 2022-09-15
Payer: COMMERCIAL

## 2022-09-15 VITALS
DIASTOLIC BLOOD PRESSURE: 72 MMHG | OXYGEN SATURATION: 98 % | BODY MASS INDEX: 32.78 KG/M2 | SYSTOLIC BLOOD PRESSURE: 118 MMHG | HEART RATE: 90 BPM | HEIGHT: 63 IN | WEIGHT: 185 LBS

## 2022-09-15 DIAGNOSIS — R00.0 TACHYCARDIA: Primary | ICD-10-CM

## 2022-09-15 PROCEDURE — 99213 OFFICE O/P EST LOW 20 MIN: CPT | Performed by: STUDENT IN AN ORGANIZED HEALTH CARE EDUCATION/TRAINING PROGRAM

## 2022-09-15 NOTE — PROGRESS NOTES
[facility] [address], [address] [facility], [address] [place] [address]    Office [phone],EDS [phone]           Aaron Fernandez is a 24 y.o. female presents the office for f/up evaluation of tachycardia. Assessment/Recommendations:      ICD-10-CM ICD-9-CM    1. Tachycardia  R00.0 785.0             Palpitations, inappropriate sinus tachycardia  -recommend patient obtain an [employer] Kardia and monitor her heart rhythm with symptoms of palpitations. - aggressive hydration and sodium intake    History of ankylosing spondylitis currently on immunosuppressive therapy    Family history of Yudith-Danlos in her sister. She has a history of hypermobility but currently does not have the diagnosis of EDS. Primary Care Physician- Pastora Holcomb, DO    Follow-up as needed    Subjective:  24 y.o. presents the office for follow-up evaluation. Feeling better. Managing symptoms better with hydration and sodium intake. Past Medical History:   Diagnosis Date    Adverse effect of anesthesia     Patient stated she woke up \"In terror\" post op    Ankylosing spondylitis ([facility] [address] 06/2020    Asthma     Broken foot 07/2019    right foot, no surgery-wore boot/cast for 6 weeks    Chronic pain     generalized, ankle to back    Migraines     Muscle spasm     Pap smear for cervical cancer screening 11/10/2021    negative    Premature birth     at 28 weeks    Routine Papanicolaou smear 9/27/19 neg    Scoliosis     Tachycardia 09/2021    TMJ (temporomandibular joint syndrome)         Past Surgical History:   Procedure Laterality Date    HX KNEE ARTHROSCOPY Left 06/2018         Current Outpatient Medications:     hydrOXYchloroQUINE (PLAQUENIL) 200 mg tablet, Take 1 Tablet by mouth daily. , Disp: 30 Tablet, Rfl: 3    EnbreL Mini 50 mg/mL (1 mL) crtg, 50 mg by SubCUTAneous route every seven (7) days. , Disp: 4 mL, Rfl: 3    fluticasone propion-salmeteroL (ADVAIR/WIXELA) 250-50 mcg/dose diskus inhaler, Advair Diskus 250 mcg-50 mcg/dose powder for inhalation  Inhale 1 puff twice a day by inhalation route., Disp: , Rfl:     albuterol (PROVENTIL HFA, VENTOLIN HFA, PROAIR HFA) 90 mcg/actuation inhaler, Take 2 Puffs by inhalation every four (4) hours as needed for Wheezing., Disp: , Rfl:     SUMAtriptan (IMITREX) 50 mg tablet, sumatriptan 50 mg tablet  1 TABLET AT LEAST 2 HOURS BETWEEN DOSES AS NEEDED ORALLY TWICE A DAY 30 DAYS, Disp: , Rfl:     tiZANidine (ZANAFLEX) 4 mg capsule, Take 4 mg by mouth nightly., Disp: , Rfl:     levocetirizine (XYZAL) 5 mg tablet, Take 5 mg by mouth daily. , Disp: , Rfl:     montelukast (SINGULAIR) 10 mg tablet, Take 10 mg by mouth daily. , Disp: , Rfl:     diphenhydrAMINE (BENADRYL) 2 % topical cream, Apply  to affected area as needed for Skin Irritation. , Disp: 30 g, Rfl: 3    Allergies   Allergen Reactions    Latex Hives    Shellfish Derived Anaphylaxis    Iodine Hives and Nausea and Vomiting    Other Medication Hives     Chloraprep    Pcn [Penicillins] Hives    Sulfa (Sulfonamide Antibiotics) Hives    Zithromax [Azithromycin] Hives        Family History   Problem Relation Age of Onset    No Known Problems Mother     No Known Problems Father     Lupus Sister     Other Sister         common variable immune deficiency    Neuropathy Sister     Other Maternal Grandfather         CFH    Breast Cancer Paternal Grandmother     Breast Cancer Other     Thyroid Disease Sister        Social History     Tobacco Use    Smoking status: Never    Smokeless tobacco: Never   Substance Use Topics    Alcohol use: No    Drug use: No       Review of Symptoms:  Pertinent Positive: Lightheadedness, dizziness, nocturnal polyuria  Pertinent Negative: Chest pain, shortness of breath, orthopnea, PND  All Other systems reviewed and are negative for a Comprehensive ROS (10+)    Physical Exam    Blood pressure 118/72, pulse 90, height 5' 3\" (1.6 m), weight 185 lb (83.9 kg), SpO2 98 %.  Constitutional:  well-developed and well-nourished. No distress. HENT: Normocephalic. Eyes: No scleral icterus. Neck:  Neck supple. No JVD present. Pulmonary/Chest: Effort normal and breath sounds normal. No respiratory distress, wheezes or rales. Cardiovascular: Normal rate, regular rhythm, S1 S2 . Exam reveals no gallop and no friction rub. No murmur heard. No edema. Extremities:  Normal muscle tone  Abdominal:   No abnormal distension. Neurological:  Moving all extremities, cranial nerves appear grossly intact. Skin: Skin is not cold. Not diaphoretic. No erythema. Psychiatric:  Grossly normal mood and affect. Intact insight. Objective Data: Investigations personally reviewed and interpreted    EC2021-normal sinus rhythm normal electrocardiogram          Investigations reviewed     Labs from primary care   TSH 1.17  White count 10.5, hemoglobin 14.8, platelets 502  Sodium 138, potassium 4.5, BUN 14, creatinine 0.78, ALT 23, AST 17      Holter monitor -22    1 PAC in 48 hrs    21    ECHO ADULT COMPLETE 10/01/2021 10/1/2021    Interpretation Summary  · LV: Estimated LVEF is 60 - 65%. Visually measured ejection fraction. Normal cavity size, wall thickness, systolic function (ejection fraction normal) and diastolic function. Signed by: Tosin Canales DO on 10/1/2021 10:02 AM        Benjamin Bella DO          ATTENTION:   This medical record was transcribed using an electronic medical records/speech recognition system. Although proofread, it may and can contain electronic, spelling and other errors. Corrections may be executed at a later time. Please feel free to contact us for any clarifications as needed.

## 2022-09-15 NOTE — PROGRESS NOTES
Raul Cody is a 24 y.o. female    Chief Complaint   Patient presents with    Follow-up     6 month Tachy    Palpitations         Chest pain no    SOB no    Dizziness patient states dizziness 5 weeks ago    Swelling no    Refills no    Visit Vitals  /72 (BP 1 Location: Left upper arm, BP Patient Position: Sitting)   Pulse 90   Ht 5' 3\" (1.6 m)   Wt 185 lb (83.9 kg)   SpO2 98%   BMI 32.77 kg/m²       1. Have you been to the ER, urgent care clinic since your last visit? Hospitalized since your last visit? ED 6/29    2. Have you seen or consulted any other health care providers outside of the [address] [place] [address] since your last visit? Include any pap smears or colon screening.  No

## 2022-10-30 ENCOUNTER — PATIENT MESSAGE (OUTPATIENT)
Dept: RHEUMATOLOGY | Age: 27
End: 2022-10-30

## 2022-10-30 DIAGNOSIS — M45.6 ANKYLOSING SPONDYLITIS OF LUMBAR REGION (HCC): Primary | ICD-10-CM

## 2022-10-31 RX ORDER — HYDROXYCHLOROQUINE SULFATE 200 MG/1
200 TABLET, FILM COATED ORAL DAILY
Qty: 30 TABLET | Refills: 3 | Status: SHIPPED | OUTPATIENT
Start: 2022-10-31 | End: 2022-11-16

## 2022-10-31 RX ORDER — HYDROXYCHLOROQUINE SULFATE 200 MG/1
200 TABLET, FILM COATED ORAL DAILY
Qty: 90 TABLET | Refills: 2 | Status: SHIPPED | OUTPATIENT
Start: 2022-10-31

## 2022-10-31 RX ORDER — ETANERCEPT 50 MG/ML
50 SOLUTION SUBCUTANEOUS
Qty: 4 ML | Refills: 3 | Status: SHIPPED | OUTPATIENT
Start: 2022-10-31

## 2022-10-31 NOTE — TELEPHONE ENCOUNTER
Patient looking to [facility].      Last seen 7/11/22  Labs done 6/29/22  Follow up scheduled for 11/29/22

## 2022-11-11 ENCOUNTER — TELEPHONE (OUTPATIENT)
Dept: RHEUMATOLOGY | Age: 27
End: 2022-11-11

## 2022-11-11 NOTE — TELEPHONE ENCOUNTER
Spoke to Rosalie with [facility], confirmed pt allergy list with [facility].  [facility] verbally acknowledged understanding and stated he will update the pt account

## 2022-11-16 ENCOUNTER — OFFICE VISIT (OUTPATIENT)
Dept: OBGYN CLINIC | Age: 27
End: 2022-11-16
Payer: COMMERCIAL

## 2022-11-16 VITALS — DIASTOLIC BLOOD PRESSURE: 84 MMHG | WEIGHT: 187.6 LBS | SYSTOLIC BLOOD PRESSURE: 122 MMHG | BODY MASS INDEX: 33.23 KG/M2

## 2022-11-16 DIAGNOSIS — Z01.419 ENCOUNTER FOR GYNECOLOGICAL EXAMINATION (GENERAL) (ROUTINE) WITHOUT ABNORMAL FINDINGS: Primary | ICD-10-CM

## 2022-11-16 DIAGNOSIS — N93.9 ABNORMAL UTERINE BLEEDING: ICD-10-CM

## 2022-11-16 PROCEDURE — 99395 PREV VISIT EST AGE 18-39: CPT | Performed by: OBSTETRICS & GYNECOLOGY

## 2022-11-16 NOTE — PROGRESS NOTES
Megan Rg is a 24 y.o. female returns for an annual exam     Chief Complaint   Patient presents with    Well Woman       No LMP recorded. Her periods are normal in flow and often irregular with no apparent pattern. She does not have dysmenorrhea. Problems:  irregular cycles ; actively trying to conceive for 2 months  Birth Control: none. Last Pap: normal obtained 1 year(s) ago. With regard to the Gardisil vaccine, she declines to receive it. 1. Have you been to the ER, urgent care clinic, or hospitalized since your last visit? No    2. Have you seen or consulted any other health care providers outside of the [address] [place] [address] since your last visit? No    Examination chaperoned by Lucian Espitia LPN.

## 2022-11-16 NOTE — PROGRESS NOTES
[facility]  [email or web address]  [phone]    Kristyn Lawton MD, [facility]     Annual Gynecologic Exam:  Chief Complaint   Patient presents with    Well Woman       Smith phillips [de-identified] ,  24 y.o. female   Patient's last menstrual period was 10/31/2022. She presents for her annual checkup. She is having  irregular cycles . Menses q 26-28 days -34 weeks. Sexual history:    She  reports being sexually active and has had partner(s) who are male. She reports using the following method of birth control/protection: Condom. Per Nursing Note:  No LMP recorded. Her periods are normal in flow and often irregular with no apparent pattern. She does not have dysmenorrhea. Problems:  irregular cycles ; actively trying to conceive for 2 months  Birth Control: none. Last Pap: normal obtained 1 year(s) ago. With regard to the Gardisil vaccine, she declines to receive it.       Sexual history and Contraception:  Social History     Substance and Sexual Activity   Sexual Activity Yes    Partners: Male    Birth control/protection: Condom           Family Medical/Cancer History:   Family History   Problem Relation Age of Onset    No Known Problems Mother     No Known Problems Father     Lupus Sister     Other Sister         common variable immune deficiency    Neuropathy Sister     Other Maternal Grandfather         CFH    Breast Cancer Paternal Grandmother     Breast Cancer Other     Thyroid Disease Sister         Past Medical History:   Diagnosis Date    Adverse effect of anesthesia     Patient stated she woke up \"In terror\" post op    Ankylosing spondylitis ([facility] [address] 06/2020    Asthma     Broken foot 07/2019    right foot, no surgery-wore boot/cast for 6 weeks    Chronic pain     generalized, ankle to back    Migraines     Muscle spasm     Pap smear for cervical cancer screening 11/10/2021    negative    Premature birth     at 28 weeks    Routine Papanicolaou smear 9/27/19 neg Scoliosis     Tachycardia 2021    TMJ (temporomandibular joint syndrome)      Current Outpatient Medications   Medication Sig    EnbreL Mini 50 mg/mL (1 mL) crtg 50 mg by SubCUTAneous route every seven (7) days. hydrOXYchloroQUINE (PLAQUENIL) 200 mg tablet Take 1 Tablet by mouth daily. albuterol (PROVENTIL HFA, VENTOLIN HFA, PROAIR HFA) 90 mcg/actuation inhaler Take 2 Puffs by inhalation every four (4) hours as needed for Wheezing. SUMAtriptan (IMITREX) 50 mg tablet sumatriptan 50 mg tablet   1 TABLET AT LEAST 2 HOURS BETWEEN DOSES AS NEEDED ORALLY TWICE A DAY 30 DAYS    tiZANidine (ZANAFLEX) 4 mg capsule Take 4 mg by mouth nightly. levocetirizine (XYZAL) 5 mg tablet Take 5 mg by mouth daily. montelukast (SINGULAIR) 10 mg tablet Take 10 mg by mouth daily. diphenhydrAMINE (BENADRYL) 2 % topical cream Apply  to affected area as needed for Skin Irritation. hydrOXYchloroQUINE (PLAQUENIL) 200 mg tablet Take 1 Tablet by mouth daily. fluticasone propion-salmeteroL (ADVAIR/WIXELA) 250-50 mcg/dose diskus inhaler Advair Diskus 250 mcg-50 mcg/dose powder for inhalation   Inhale 1 puff twice a day by inhalation route. No current facility-administered medications for this visit. OB History    Para Term  AB Living   0 0 0 0 0 0   SAB IAB Ectopic Molar Multiple Live Births   0 0 0 0 0 0   Obstetric Comments   Menarche:  17. LMP: 20. # of Children:  0. Age at Delivery of First Child:  n/a. Hysterectomy/oophorectomy:  NO/NO. Breast Bx:  no.  Hx of Breast Feeding:  n/a.   BCP:  no. Hormone therapy:  no.      Past Surgical History:   Procedure Laterality Date    HX KNEE ARTHROSCOPY Left 2018     Family History   Problem Relation Age of Onset    No Known Problems Mother     No Known Problems Father     Lupus Sister     Other Sister         common variable immune deficiency    Neuropathy Sister     Other Maternal Grandfather         CFH    Breast Cancer Paternal Grandmother Breast Cancer Other     Thyroid Disease Sister      Social History     Socioeconomic History    Marital status:      Spouse name: Not on file    Number of children: Not on file    Years of education: Not on file    Highest education level: Not on file   Occupational History     Comment: works at a pharmacy   Tobacco Use    Smoking status: Never    Smokeless tobacco: Never   Substance and Sexual Activity    Alcohol use: No    Drug use: No    Sexual activity: Yes     Partners: Male     Birth control/protection: Condom   Other Topics Concern    Not on file   Social History Narrative    Not on file     Social Determinants of Health     Financial Resource Strain: Not on file   Food Insecurity: Not on file   Transportation Needs: Not on file   Physical Activity: Not on file   Stress: Not on file   Social Connections: Not on file   Intimate Partner Violence: Not on file   Housing Stability: Not on file     Tobacco History:  reports that she has never smoked. She has never used smokeless tobacco.  Alcohol Abuse:  reports no history of alcohol use. Drug Abuse:  reports no history of drug use.   Allergies   Allergen Reactions    Latex Hives    Shellfish Derived Anaphylaxis    Iodine Hives and Nausea and Vomiting    Other Medication Hives     Chloraprep    Pcn [Penicillins] Hives    Sulfa (Sulfonamide Antibiotics) Hives    Zithromax [Azithromycin] Hives       Patient Active Problem List   Diagnosis Code    [facility] of [facility] M45.6    Long-term use of immunosuppressant medication Z79.60    Ankylosing spondylitis (HCC) M45.9    Scoliosis M41.9    Asthma J45.909    Tachycardia R00.0    Class 1 obesity due to excess calories without serious comorbidity with body mass index (BMI) of 32.0 to 32.9 in adult E66.09, Z68.32       Review of Systems - History obtained from the patient and patient filled out questionnaire   Constitutional/general, HEENT, CV, Resp, GI, MSK, Neuro, Psych, Heme/lymph, Skin, Breast ROS: no significant complaints except as noted on HPI    Physical Exam  Visit Vitals  /84   Wt 187 lb 9.6 oz (85.1 kg)   LMP 10/31/2022   BMI 33.23 kg/m²       Constitutional  Appearance: well-nourished, well developed, alert, in no acute distress    HENT  Head and Face: appears normal    Neck  Inspection/Palpation: normal appearance, no masses or tenderness  Lymph Nodes: no lymphadenopathy present  Thyroid: gland size normal, nontender, no nodules or masses present on palpation    Chest  Respiratory Effort: breathing unlabored  Auscultation: normal breath sounds    Cardiovascular  Heart:   Auscultation: regular rate and rhythm without murmur    Breasts  Inspection of Breasts: breasts symmetrical, no skin changes, no discharge present, nipple appearance normal, no skin retraction present  Palpation of Breasts and Axillae: no masses present on palpation, no breast tenderness  Axillary Lymph Nodes: no lymphadenopathy present    Gastrointestinal  Abdominal Examination: abdomen non-tender to palpation, normal bowel sounds, no masses present  Liver and spleen: no hepatomegaly present, spleen not palpable  Hernias: no hernias identified    Genitourinary  External Genitalia: normal appearance for age, no discharge present, no tenderness present, no inflammatory lesions present, no masses present  Vagina: normal vaginal vault without central or paravaginal defects, white discharge present, no inflammatory lesions present, no masses present  Bladder: non-tender to palpation  Urethra: appears normal  Cervix: normal   Uterus: normal size, shape and consistency  Adnexa: no adnexal tenderness present, no adnexal masses present  Perineum: perineum within normal limits, no evidence of trauma, no rashes or skin lesions present  Anus: anus within normal limits, no hemorrhoids present  Inguinal Lymph Nodes: no lymphadenopathy present    Skin  General Inspection: no rash, no lesions identified    Neurologic/Psychiatric  Mental Status:  Orientation: grossly oriented to person, place and time  Mood and Affect: mood normal, affect appropriate    Assessment:  24 y.o.  for well woman exam  Her current medical status is satisfactory with no evidence of significant gynecologic issues. Encounter Diagnoses   Name Primary? Encounter for gynecological examination (general) (routine) without abnormal findings Yes    Abnormal uterine bleeding        Plan:  The patient was counseled about diet, exercise, healthy lifestyle  I recommend annual well woman exams  We discussed current pap smear and HR HPV testing guidelines. I recommended follow up one year for routine annual gynecologic exam or sooner prn  Handouts were given to the patient  I recommended follow up with a primary care physician for chronic medical problems and evaluation of non-gynecologic concerns and to please contact our office with any GYN questions or concerns. I recommended testing per CDC guidelines and at patient request.   TSH  We discussed timed intercourse, menstrual charting, and s/sx of ovulation. I recommended a daily prenatal vitamin. We discussed that if conception does not occur within one year then additional evaluation may be indicated. FU and US for irregular cycles family planning  Disc potential risk of current medications in pregnancy and weighing risks and benefits of continuing         Folllow up:  [x] return for annual well woman exam in one year or sooner if she is having problems  [] follow up and ultrasound  [] 6 months  [] 3 months  [] 6 weeks   [] 1 month    Orders Placed This Encounter    TSH 3RD GENERATION       No results found for any visits on 22.

## 2022-11-17 LAB — TSH SERPL DL<=0.05 MIU/L-ACNC: 1.59 UIU/ML (ref 0.36–3.74)

## 2022-11-18 LAB
C TRACH RRNA SPEC QL NAA+PROBE: NEGATIVE
N GONORRHOEA RRNA SPEC QL NAA+PROBE: NEGATIVE
SPECIMEN STATUS REPORT, ROLRST: NORMAL
T VAGINALIS RRNA SPEC QL NAA+PROBE: NEGATIVE

## 2023-02-01 ENCOUNTER — PATIENT MESSAGE (OUTPATIENT)
Dept: OBGYN CLINIC | Age: 28
End: 2023-02-01

## 2023-02-03 NOTE — TELEPHONE ENCOUNTER
24year old LMP 12/30/2022 ( 5w0d) pregnant    Patient has been messaging and reports she got a positive up on 1/27 and started with spotting on 1/31/2023 and the bleeding is like a flow and she is changing a pad every 3 hours and reports cramping at 5-6 on the pain scale of 1-10    Patient reports passing clots. Patient was advised of MD recommendations and is checking with PCP if she can get the beta done there since it is closer to her home    Patient was placed on the schedule to be seen for problem visit on 2/6/2023 at 2:30PM    Patient advised of lab hours for today and will call back to schedule if needed    Patient was provided the option of just waiting and doing a upt at home in two weeks as offered by Dr Adolfo Chacon    Patient was advised of pain and bleeding precautions, increase po fluids, rest and can take tylenol    Patient verbalized understanding.

## 2023-02-06 ENCOUNTER — OFFICE VISIT (OUTPATIENT)
Dept: OBGYN CLINIC | Age: 28
End: 2023-02-06
Payer: COMMERCIAL

## 2023-02-06 VITALS
HEIGHT: 63 IN | HEART RATE: 106 BPM | BODY MASS INDEX: 34.38 KG/M2 | SYSTOLIC BLOOD PRESSURE: 120 MMHG | WEIGHT: 194 LBS | DIASTOLIC BLOOD PRESSURE: 77 MMHG

## 2023-02-06 DIAGNOSIS — N93.9 ABNORMAL UTERINE BLEEDING: ICD-10-CM

## 2023-02-06 DIAGNOSIS — O20.0 THREATENED MISCARRIAGE: Primary | ICD-10-CM

## 2023-02-06 LAB
HCG URINE, QL. (POC): NEGATIVE
VALID INTERNAL CONTROL?: YES

## 2023-02-06 PROCEDURE — 81025 URINE PREGNANCY TEST: CPT | Performed by: OBSTETRICS & GYNECOLOGY

## 2023-02-06 PROCEDURE — 99212 OFFICE O/P EST SF 10 MIN: CPT | Performed by: OBSTETRICS & GYNECOLOGY

## 2023-02-06 NOTE — PROGRESS NOTES
[facility]  [email or web address]  [phone]    Morgan Shultz MD, [address] [facility]       OB/GYN Problem visit      HPI  Paras Brown is a [de-identified] , 24 y.o. female who presents for a problem visit. Chief Complaint   Patient presents with    Threatened Miscarriage       Bleeding has resolved. Per Rooming Note:  Paras Brown is a 24 y.o. female presents for a problem visit. Patient's last menstrual period was 12/30/2022 (exact date). Birth Control: none. Last Pap: approximate date 11/2021 and was normal  Last or next [address] is: 11/2022     Pt had a pos UPT 1/27/23 1/31/23- pt saw bleeding when she wipes, she bled heavy until 2/4/23, lots of blood clots around the size of a quarter. Pt reports she had abdominal cramping as well. Pt stopped bleeding 2/4/23 & cramping stopped yesterday. Denies using birth control. Pt trying to conceive. Pt reports this is her 1st pregnancy  Pt reports fatigue. Denies nausea, breast tenderness, or freq urination. Cycle history: 10/31/22- 11/4/22 12/2/22- 12/6/22 12/30/22-1/3/23     Pt had beta HCG on Friday 2/3/23 with PCP & her HCG level was 4 per records on pt's phone from [address] [place] [address].     This is a new problem. She has not experienced this problem before. She reports the symptoms are has improved. Aggravating factors include none. And alleviating factors include none. She does not have other concerns.     Sexual history and Contraception:  Social History     Substance and Sexual Activity   Sexual Activity Yes    Partners: Male    Birth control/protection: Condom       Past Medical History:   Diagnosis Date    Adverse effect of anesthesia     Patient stated she woke up \"In terror\" post op    Ankylosing spondylitis ([facility] [address] 06/2020    Asthma     Broken foot 07/2019    right foot, no surgery-wore boot/cast for 6 weeks    Chronic pain     generalized, ankle to back    Migraines     Muscle spasm     Pap smear for cervical cancer screening 11/10/2021    negative    Premature birth     at 28 weeks    Routine Papanicolaou smear 19 neg    Scoliosis     Tachycardia 2021    TMJ (temporomandibular joint syndrome)      Current Outpatient Medications   Medication Sig    EnbreL Mini 50 mg/mL (1 mL) crtg 50 mg by SubCUTAneous route every seven (7) days. hydrOXYchloroQUINE (PLAQUENIL) 200 mg tablet Take 1 Tablet by mouth daily. albuterol (PROVENTIL HFA, VENTOLIN HFA, PROAIR HFA) 90 mcg/actuation inhaler Take 2 Puffs by inhalation every four (4) hours as needed for Wheezing. SUMAtriptan (IMITREX) 50 mg tablet sumatriptan 50 mg tablet   1 TABLET AT LEAST 2 HOURS BETWEEN DOSES AS NEEDED ORALLY TWICE A DAY 30 DAYS    tiZANidine (ZANAFLEX) 4 mg capsule Take 4 mg by mouth nightly. levocetirizine (XYZAL) 5 mg tablet Take 5 mg by mouth daily. montelukast (SINGULAIR) 10 mg tablet Take 10 mg by mouth daily. diphenhydrAMINE (BENADRYL) 2 % topical cream Apply  to affected area as needed for Skin Irritation. No current facility-administered medications for this visit. OB History    Para Term  AB Living   0 0 0 0 0 0   SAB IAB Ectopic Molar Multiple Live Births   0 0 0 0 0 0   Obstetric Comments   Menarche:  17. LMP: 20. # of Children:  0. Age at Delivery of First Child:  n/a. Hysterectomy/oophorectomy:  NO/NO. Breast Bx:  no.  Hx of Breast Feeding:  n/a.   BCP:  no. Hormone therapy:  no.      Past Surgical History:   Procedure Laterality Date    HX KNEE ARTHROSCOPY Left 2018     Family History   Problem Relation Age of Onset    No Known Problems Mother     No Known Problems Father     Lupus Sister     Other Sister         common variable immune deficiency    Neuropathy Sister     Other Maternal Grandfather         CFH    Breast Cancer Paternal Grandmother     Breast Cancer Other     Thyroid Disease Sister      Social History     Socioeconomic History    Marital status:      Spouse name: Not on file Number of children: Not on file    Years of education: Not on file    Highest education level: Not on file   Occupational History     Comment: works at a pharmacy   Tobacco Use    Smoking status: Never    Smokeless tobacco: Never   Substance and Sexual Activity    Alcohol use: No    Drug use: No    Sexual activity: Yes     Partners: Male     Birth control/protection: Condom   Other Topics Concern    Not on file   Social History Narrative    Not on file     Social Determinants of Health     Financial Resource Strain: Not on file   Food Insecurity: Not on file   Transportation Needs: Not on file   Physical Activity: Not on file   Stress: Not on file   Social Connections: Not on file   Intimate Partner Violence: Not on file   Housing Stability: Not on file     Tobacco History:  reports that she has never smoked. She has never used smokeless tobacco.  Alcohol Abuse:  reports no history of alcohol use. Drug Abuse:  reports no history of drug use.   Allergies   Allergen Reactions    Latex Hives    Shellfish Derived Anaphylaxis    Iodine Hives and Nausea and Vomiting    Other Medication Hives     Chloraprep    Pcn [Penicillins] Hives    Sulfa (Sulfonamide Antibiotics) Hives    Zithromax [Azithromycin] Hives       Patient Active Problem List   Diagnosis Code    [facility] M45.6    Long-term use of immunosuppressant medication Z79.60    Ankylosing spondylitis (HCC) M45.9    Scoliosis M41.9    Asthma J45.909    Tachycardia R00.0    Class 1 obesity due to excess calories without serious comorbidity with body mass index (BMI) of 32.0 to 32.9 in adult E66.09, Z68.32             Review of Systems: History obtained from the patient  Constitutional: negative for weight loss, fever, night sweats  Breast: negative for breast lumps, nipple discharge, galactorrhea  GI: negative for change in bowel habits, abdominal pain, black or bloody stools  : see HPI   MSK: negative for back pain, joint pain, muscle pain  Skin: negative for itching, rash, hives  Psych: negative for anxiety, depression, change in mood      Objective:  Visit Vitals  /77   Pulse (!) 106   Ht 5' 3\" (1.6 m)   Wt 194 lb (88 kg)   LMP 12/30/2022 (Exact Date)   BMI 34.37 kg/m²       PHYSICAL EXAMINATION:  GENERAL: alert, well appearing, and in no distress  HEAD: normocephalic, atraumatic. ABDOMEN: soft, nontender, nondistended, no masses or organomegaly   EGBUS: no lesions, no inflammation, no masses  VULVA: normal appearing vulva with no masses, tenderness or lesions  VAGINA: normal, non tender no masses  CERVIX: normal appearing cervix without discharge or lesions, non tender closed  UTERUS: uterus is normal size, shape, consistency and nontender   ADNEXA: normal adnexa in size, nontender and no masses  NEURO: alert, grossly oriented to place and time, normal speech,       ASSESSMENT:    ICD-10-CM ICD-9-CM    1. Threatened miscarriage  O20.0 640.00 AMB POC URINE PREGNANCY TEST, VISUAL COLOR COMPARISON      2. Abnormal uterine bleeding  N93.9 626.9       SAB x1    PLAN:  Orders Placed This Encounter    AMB POC URINE PREGNANCY TEST, VISUAL COLOR COMPARISON       The patient was instructed to follow up as needed if symptoms persist or worsen. Instructions were given to patient and the patient was given the opportunity to ask any questions concerning the visit today. The patient should keep/make her routine well woman exam.  The patient should contact our office with any questions or concerns. We discussed timed intercourse, menstrual charting, and s/sx of ovulation. I recommended a daily prenatal vitamin. We discussed that if conception does not occur within one year then additional evaluation may be indicated.      Disc causes of early sab and option of early HCG    Today, 02/06/23, I personally spent more than 12 minutes in review of records, documentation,  and face to face counseling with the patient discussing the diagnosis, plan of care and importance of compliance with the treatment plan and performing an exam as well as ordering any appropropriate labs, procedures, or medications.      Results for orders placed or performed in visit on 02/06/23   AMB POC URINE PREGNANCY TEST, VISUAL COLOR COMPARISON   Result Value Ref Range    VALID INTERNAL CONTROL POC Yes     HCG urine, Ql. (POC) Negative Negative

## 2023-02-06 NOTE — PROGRESS NOTES
Rooming note, gyn problem visit:    Chief Complaint   Patient presents with    Threatened Miscarriage       Shalom Maldonado is a 24 y.o. female presents for a problem visit. Patient's last menstrual period was 12/30/2022 (exact date). Birth Control: none. Last Pap: approximate date 11/2021 and was normal  Last or next [address] is: 11/2022    Pt had a pos UPT 1/27/23 1/31/23- pt saw bleeding when she wipes, she bled heavy until 2/4/23, lots of blood clots around the size of a quarter. Pt reports she had abdominal cramping as well. Pt stopped bleeding 2/4/23 & cramping stopped yesterday. Denies using birth control. Pt trying to conceive. Pt reports this is her 1st pregnancy  Pt reports fatigue. Denies nausea, breast tenderness, or freq urination. Cycle history: 10/31/22- 11/4/22 12/2/22- 12/6/22 12/30/22-1/3/23    Pt had beta HCG on Friday 2/3/23 with PCP & her HCG level was 4 per records on pt's phone from [address] [place] [address].    This is a new problem. She has not experienced this problem before. She reports the symptoms are has improved. Aggravating factors include none. And alleviating factors include none. She does not have other concerns. 1. Have you seen or consulted any other health care providers outside of the [address] [place] [address] since your last visit?   PCP on 2/3/23 for beta HCG blood work

## 2023-04-04 ENCOUNTER — OFFICE VISIT (OUTPATIENT)
Dept: RHEUMATOLOGY | Age: 28
End: 2023-04-04
Payer: COMMERCIAL

## 2023-04-04 PROCEDURE — 99214 OFFICE O/P EST MOD 30 MIN: CPT | Performed by: PEDIATRICS

## 2023-04-04 NOTE — PROGRESS NOTES
Chief Complaint   Patient presents with    Joint Pain     1. Have you been to the ER, urgent care clinic since your last visit? Hospitalized since your last visit? No    2. Have you seen or consulted any other health care providers outside of the [address] [place] [address] since your last visit? Include any pap smears or colon screening.  No

## 2023-04-04 NOTE — PROGRESS NOTES
RHEUMATOLOGY PROBLEM LIST AND CHIEF COMPLAINT  1. Ankylosing Spondylitis - inflammatory back pain, spinous process edema T12-L1     Immunosuppression Screening (5/07/2021): Quantiferon TB: negative  PPD:  Not performed  Hepatitis B: negative  Hepatitis C: negative    Therapy History includes:  Current NSAIDs include: ibuprofen 200 mg  Current DMARD therapy include:   Prior DMARD therapy include: Humira 40 mg every 14 days (7/11/2020 to 7/28/2020), Enbrel 50 mg every Saturday (8/01/2020 to 8/26/2020), Enbrel Mini 50 mg every Saturday (9/05/2020 to 12/2022)  Discontinued DMARDs because of inefficacy: None  Discontinued DMARDs because of side effects: Humira (nausea, vomiting, headache, injection site reaction), Enbrel SureClick (injection site reaction)    INTERVAL HISTORY  This is a 24 y.o.  female. Today, the patient complains of pain in the joints. Location: back   Severity: 0 on a scale of 0-10  Timing: all day     Duration: 5 months     Modifying factors:   Context/Associated signs and symptoms: At the patient's last appointment we started her on Plaquenil 200 mg daily and continued her on Enbrel weekly. She is taking the Plaquenil, but has been off Enbrel for the past 4 months since she was having problems getting this medication from the pharmacy. She states that she has been doing well off Enbrel and denies any worsening of joint symptoms. She continues to have morning stiffness in her lower back and SI joints lasting 1-2 hours. Of note, she is trying to get pregnant.      RHEUMATOLOGY REVIEW OF SYSTEMS   Positives as per history  Negatives as follows:  Obdulia Licona:  Denies unexplained persistent fevers, weight change, chronic fatigue  HEAD/EYES:   Denies eye redness, blurry vision or sudden loss of vision, dry eyes, HA, temporal artery pain  ENT:    Denies oral/nasal ulcers, recurrent sinus infections, dry mouth  RESPIRATORY:  No pleuritic pain, history of pleural effusions, hemoptysis, exertional dyspnea  CARDIOVASCULAR: Denies chest pain, history of pericardial effusions  GASTRO:   Denies heartburn, esophageal dysmotility, abdominal pain, nausea, vomiting, diarrhea, blood in the stool  HEMATOLOGIC:  No easy bruising, purpura, swollen lymph nodes  SKIN:    Denies alopecia, ulcers, nodules, sun sensitivity, unexplained persistent rash   VASCULAR:   Denies edema, cyanosis, raynaud phenomenon  NEUROLOGIC:  Denies specific muscle weakness, paresthesias   PSYCHIATRIC:  No sleep disturbance / snoring, depression, anxiety  MSK:    No persistent joint swelling, persistent joint pain    PAST MEDICAL HISTORY  Reviewed with patient, significant changes in medical history - no    FAMILY HISTORY  autoimmune thyroid disease and lupus - sister    PHYSICAL EXAM  Blood pressure 114/79, pulse 83, temperature 97.9 °F (36.6 °C), temperature source Oral, resp. rate 16, weight 196 lb (88.9 kg), last menstrual period 03/30/2023, SpO2 98 %. GENERAL APPEARANCE: Well-nourished, no acute distress  EYES: No scleral erythema, conjunctival injection  ENT: No oral ulcer, parotid enlargement  NECK: No adenopathy, thyroid enlargement  CARDIOVASCULAR: Heart rhythm is regular. No murmur, rub, gallop  CHEST: Normal vesicular breath sounds. No wheezes, rales, pleural friction rubs  ABDOMINAL: The abdomen is soft and nontender.  Bowel sounds are normal  EXTREMITIES: There is no evidence of clubbing, cyanosis, edema  SKIN: No rash, palpable purpura, digital ulcer, abnormal thickening, normal nailfold capillaries   NEUROLOGICAL: Normal gait and station, full strength in upper and lower extremities,  normal sensation to light touch  MUSCULOSKELETAL: positive triny's and pain in the SI joint   Upper extremities - full range of motion, no tenderness, no swelling, no synovial thickening and no deformity of joints  Lower extremities - full range of motion, no tenderness, no swelling, no synovial thickening and no deformity of joints except dROM in the left hip    LABS, RADIOLOGY AND PROCEDURES  Previous labs reviewed -Yes  Previous radiology reviewed -Yes  Previous procedures reviewed -Yes  Previous medical records reviewed/summarized -Yes    ASSESSMENT  1. Ankylosing Spondylitis - The patient has been doing well since stopping Enbrel, but still has some morning stiffness. She will continue with Plaquenil 200 mg daily for now. I ordered an MRI of her pelvis to have done around June to see if she has any active inflammation off Enbrel. Discussed Plaquenil is safe to take if she gets pregnant, however, she may not need to stay on this as her disease may improve while pregnant. Lab work ordered today. 3. Drug therapy monitoring for toxicity (biologic) - CBC, BUN, Cr, AST, ALT and albumin every 4 months    PLAN  1. Continue off Enbrel mini weekly   2. Plaquenil 200 mg daily  3. Check CMP, CBC  4. MRI of pelvis - around June 2023  5. Follow up in 6 months    Ana Lilia Mansfield MD  Adult and Pediatric Rheumatology     [facility], [address] [place] [address], Phone [phone], Fax [phone]    Visiting  of Pediatrics    Department of Pediatrics, [facility] of [address] [place] [address], [address] [place] [address], Phone [phone], Fax [phone]    There are no Patient Instructions on file for this visit. cc:  Morgan Archibald, DO MADERA, Katie Alvarado MD, personally performed the services described in the documentation as scribed by Aleksandr Hackett in my presence and have reviewed and agree with the note as scribed.

## 2023-04-29 DIAGNOSIS — M45.9 ANKYLOSING SPONDYLITIS, UNSPECIFIED SITE OF SPINE (HCC): Primary | ICD-10-CM

## 2023-05-29 RX ORDER — SUMATRIPTAN 50 MG/1
TABLET, FILM COATED ORAL
COMMUNITY
Start: 2021-07-30

## 2023-05-29 RX ORDER — MONTELUKAST SODIUM 10 MG/1
10 TABLET ORAL DAILY
COMMUNITY

## 2023-05-29 RX ORDER — HYDROXYCHLOROQUINE SULFATE 200 MG/1
200 TABLET, FILM COATED ORAL DAILY
COMMUNITY
Start: 2022-10-31

## 2023-05-29 RX ORDER — LEVOCETIRIZINE DIHYDROCHLORIDE 5 MG/1
5 TABLET, FILM COATED ORAL DAILY
COMMUNITY

## 2023-05-29 RX ORDER — TIZANIDINE HYDROCHLORIDE 4 MG/1
4 CAPSULE, GELATIN COATED ORAL NIGHTLY
COMMUNITY

## 2023-05-29 RX ORDER — ETANERCEPT 50 MG/ML
50 SOLUTION SUBCUTANEOUS
COMMUNITY
Start: 2022-10-31

## 2023-05-29 RX ORDER — ALBUTEROL SULFATE 90 UG/1
2 AEROSOL, METERED RESPIRATORY (INHALATION) EVERY 4 HOURS PRN
COMMUNITY

## 2023-06-08 ENCOUNTER — HOSPITAL ENCOUNTER (OUTPATIENT)
Facility: HOSPITAL | Age: 28
Discharge: HOME | End: 2023-06-08
Attending: PEDIATRICS
Payer: COMMERCIAL

## 2023-06-08 DIAGNOSIS — M45.9 ANKYLOSING SPONDYLITIS, UNSPECIFIED SITE OF SPINE (HCC): ICD-10-CM

## 2023-06-08 PROCEDURE — 72195 MRI PELVIS W/O DYE: CPT

## 2023-07-28 ENCOUNTER — NURSE ONLY (OUTPATIENT)
Age: 28
End: 2023-07-28

## 2023-07-28 DIAGNOSIS — E34.9 ELEVATED SERUM HCG: Primary | ICD-10-CM

## 2023-07-28 DIAGNOSIS — O09.299 H/O MISCARRIAGE, CURRENTLY PREGNANT: ICD-10-CM

## 2023-07-29 LAB — HCG SERPL-ACNC: 2273 MIU/ML (ref 0–6)

## 2023-07-30 LAB — PROGEST SERPL-MCNC: 11.5 NG/ML

## 2023-08-16 ENCOUNTER — OFFICE VISIT (OUTPATIENT)
Age: 28
End: 2023-08-16

## 2023-08-16 VITALS
HEART RATE: 103 BPM | DIASTOLIC BLOOD PRESSURE: 82 MMHG | HEIGHT: 64 IN | SYSTOLIC BLOOD PRESSURE: 121 MMHG | BODY MASS INDEX: 33.19 KG/M2 | WEIGHT: 194.4 LBS

## 2023-08-16 DIAGNOSIS — Z29.13 NEED FOR RHOGAM DUE TO RH NEGATIVE MOTHER: ICD-10-CM

## 2023-08-16 DIAGNOSIS — M54.9 BACK PAIN IN PREGNANCY: ICD-10-CM

## 2023-08-16 DIAGNOSIS — Z67.91 RH NEGATIVE STATE IN ANTEPARTUM PERIOD: ICD-10-CM

## 2023-08-16 DIAGNOSIS — N93.9 VAGINAL BLEEDING: Primary | ICD-10-CM

## 2023-08-16 DIAGNOSIS — O99.891 BACK PAIN IN PREGNANCY: ICD-10-CM

## 2023-08-16 DIAGNOSIS — O26.899 RH NEGATIVE STATE IN ANTEPARTUM PERIOD: ICD-10-CM

## 2023-08-16 DIAGNOSIS — N92.0 SPOTTING: ICD-10-CM

## 2023-08-16 DIAGNOSIS — O46.90 VAGINAL BLEEDING IN PREGNANCY: ICD-10-CM

## 2023-08-16 DIAGNOSIS — Z11.3 VENEREAL DISEASE SCREENING: ICD-10-CM

## 2023-08-16 LAB
BILIRUBIN, URINE, POC: NEGATIVE
BLOOD BANK CMNT PATIENT-IMP: NORMAL
BLOOD GROUP ANTIBODIES SERPL: NORMAL
BLOOD URINE, POC: NEGATIVE
GLUCOSE URINE, POC: NEGATIVE
KETONES, URINE, POC: NEGATIVE
LEUKOCYTE ESTERASE, URINE, POC: NEGATIVE
NITRITE, URINE, POC: NEGATIVE
PH, URINE, POC: 6 (ref 4.6–8)
PROTEIN,URINE, POC: NEGATIVE
SPECIFIC GRAVITY, URINE, POC: 1.01 (ref 1–1.03)
URINALYSIS CLARITY, POC: CLEAR
URINALYSIS COLOR, POC: YELLOW
UROBILINOGEN, POC: NORMAL

## 2023-08-16 NOTE — PROGRESS NOTES
[address] [facility]  [email or web address]  [email or web address]  [phone]    [place], [place], [address] [facility]      OB/GYN Problem visit    HPI  Dimitri Woodson is a , 24 y.o. female who presents for a problem visit. Chief Complaint   Patient presents with    Threatened Miscarriage    Ultrasound       Cramping last week that has resolved. + lower back pain, chronic. Ho NSAIDs, muscle relaxer. Ho PT. Bleeding resolved. VB: 8/10 dark spotting. FOB O negative. Had MRI last month: SI joint affected. Per Rooming Note:  Dimitri Woodson is a 24 y.o. female presents for a problem visit. Patient's last menstrual period was 2023. Birth Control: none. Last Pap: approximate date 2021 and was normal  Last or next WWE is: 22     The patient is reporting having: LMP of 23 & was not on birth control at time of conception. Pt reports brown discharge last Thursday, denies bleeding. Pt has been getting labs done with mostly her PCP & once with us. Pt reports she has Ankylosing spondylitis & Scoliosis & was told in the past she would be considered high risk. She is taking Allegra instead of Xyzal for allergy relief. Pt's EOB is with an ultrasound on 23. Pt had the following labs done mostly with PCP:   23: hcg 33   23: hcg 99   23: hcg 2,273 & progesterone 11.5 (done in our office)   23: hcg 7,751 & progesterone 12.5   8/10/23: hcg 35,337   23: hcg 53,707, & progesterone 10.8, & type & screen A negative      Discuss getting rhogam vaccine. Pt unable to leave urine sample at this time, will try on the way out if needed. This is a new problem. She has not experienced this problem before. She reports the symptoms are has improved. Aggravating factors include none. And alleviating factors include none. She does not have other concerns.        Sexual history and

## 2023-08-16 NOTE — PROGRESS NOTES
Rooming note, gyn problem visit:    Chief Complaint   Patient presents with    Threatened Miscarriage    Ultrasound     Dennis Barnett is a 24 y.o. female presents for a problem visit. Patient's last menstrual period was 06/25/2023. Birth Control: none. Last Pap: approximate date 11/2021 and was normal  Last or next WWE is: 11/16/22    The patient is reporting having: LMP of 6/25/23 & was not on birth control at time of conception. Pt reports brown discharge last Thursday, denies bleeding. Pt has been getting labs done with mostly her PCP & once with us. Pt reports she has Ankylosing spondylitis & Scoliosis & was told in the past she would be considered high risk. She is taking Allegra instead of Xyzal for allergy relief. Pt's EOB is with an ultrasound on 8/24/23. Pt had the following labs done mostly with PCP:   7/18/23: hcg 33   7/20/23: hcg 99   7/28/23: hcg 2,273 & progesterone 11.5 (done in our office)   8/1/23: hcg 7,751 & progesterone 12.5   8/10/23: hcg 35,337   8/14/23: hcg 53,707, & progesterone 10.8, & type & screen A negative     Discuss getting rhogam vaccine. Pt unable to leave urine sample at this time, will try on the way out if needed. This is a new problem. She has not experienced this problem before. She reports the symptoms are has improved. Aggravating factors include none. And alleviating factors include none. She does not have other concerns.

## 2023-08-17 LAB
BACTERIA SPEC CULT: NORMAL
CC UR VC: NORMAL
SERVICE CMNT-IMP: NORMAL

## 2023-08-19 LAB
C TRACH RRNA SPEC QL NAA+PROBE: NEGATIVE
C. TRACHOMATIS, EXTERNAL RESULT: NEGATIVE
N GONORRHOEA RRNA SPEC QL NAA+PROBE: NEGATIVE
N. GONORRHOEAE, EXTERNAL RESULT: NEGATIVE
T VAGINALIS RRNA SPEC QL NAA+PROBE: NEGATIVE

## 2023-08-24 ENCOUNTER — ROUTINE PRENATAL (OUTPATIENT)
Age: 28
End: 2023-08-24

## 2023-08-24 VITALS — WEIGHT: 193 LBS | DIASTOLIC BLOOD PRESSURE: 93 MMHG | BODY MASS INDEX: 33.65 KG/M2 | SYSTOLIC BLOOD PRESSURE: 123 MMHG

## 2023-08-24 DIAGNOSIS — Z34.80 SUPERVISION OF OTHER NORMAL PREGNANCY, ANTEPARTUM: Primary | ICD-10-CM

## 2023-08-24 DIAGNOSIS — Z11.3 VENEREAL DISEASE SCREENING: ICD-10-CM

## 2023-08-24 DIAGNOSIS — Z34.80 PRENATAL CARE OF MULTIGRAVIDA, ANTEPARTUM: ICD-10-CM

## 2023-08-24 PROCEDURE — 0500F INITIAL PRENATAL CARE VISIT: CPT | Performed by: OBSTETRICS & GYNECOLOGY

## 2023-08-24 NOTE — PROGRESS NOTES
[address] [facility]  [email or web address]  [phone]    Kylah Finn MD, [address] [facility]      Chief complaint:  Irregular cycles  Last cycle; Patient's last menstrual period was 06/25/2023. Tapan Queen is a 24 y.o. female presents for a new pregnancy visit and to establish routine OB care. She presents for the evaluation of irregular menses and a positive pregnancy test.  This is a new concern and a work up is planned. Doing well, excited. Per Rooming Note:  Last Pap smear: see report obtained 2 year(s) ago and it was normal.     LMP history:  Patient's last menstrual period was 06/25/2023. The date of her LMP is certain. Her last menstrual period was normal.  Her menstrual cycle is regular. A urine pregnancy test was positive 3 weeks ago. She was not on on contraception at time of conception. Based on her LMP, her EGA is 8 weeks 4 days giving an EDC of 3/31/2024. Ultrasound data:  She had an ultrasound done by the ultrasound tech today which revealed a viable medina pregnancy with a gestational age of 11 weeks and 5 days giving an [address] of 3/30/2024. TA ULTRASOUND PERFORMED  A SINGLE VIABLE 8W5D IUP IS SEEN WITH NORMAL CARDIAC RHYTHM. GESTATIONAL AGE BASED ON TODAY'S ULTRASOUND. A NORMAL YOLK SAC IS SEEN. RIGHT OVARY APPEARS WITHIN NORMAL LIMITS. A CL CYST IS SEEN AND MEASURES 16 X 18MM. LEFT OVARY APPEARS WITHIN NORMAL LIMITS. NO FREE FLUID SEEN IN THE CDS. Pregnancy symptoms:  She reports fatigue. She denies breast tenderness  nausea. She has not experienced  vomiting over the last few weeks. She has not experienced abnormal bleeding since she found out she was pregnant. She states she has stayed the same. Her prepregnancy weight was 193 pounds.      Relevant past pregnancy history:              She has the following pregnancy history: one previous miscarriage               She does
tests.  The patient has not had been previously tested for SMA, CF. Exposure history: There no indoor cat(s) in the home. The patient was instructed not to change cat litter boxes during pregnancy. She does not report close contact with young children on a regular basis. She is unsure chicken pox or the varicella vaccine in the past.   Patient does not report issues with domestic violence. Occupational history  Her occupation is: full time .

## 2023-08-25 LAB
ABO + RH BLD: NORMAL
ABO, EXTERNAL RESULT: NORMAL
BLOOD BANK CMNT PATIENT-IMP: NORMAL
BLOOD GROUP ANTIBODIES SERPL: NORMAL
ERYTHROCYTE [DISTWIDTH] IN BLOOD BY AUTOMATED COUNT: 12.3 % (ref 11.5–14.5)
HBV SURFACE AG SER QL: <0.1 INDEX
HBV SURFACE AG SER QL: NEGATIVE
HCT VFR BLD AUTO: 41.4 % (ref 35–47)
HCV AB SER IA-ACNC: 0.14 INDEX
HCV AB SERPL QL IA: NONREACTIVE
HEP B, EXTERNAL RESULT: NEGATIVE
HEPATITIS C ANTIBODY, EXTERNAL RESULT: NEGATIVE
HGB BLD-MCNC: 13.8 G/DL (ref 11.5–16)
HIV 1+2 AB+HIV1 P24 AG SERPL QL IA: NONREACTIVE
HIV 1/2 RESULT COMMENT: NORMAL
HIV, EXTERNAL RESULT: NORMAL
MCH RBC QN AUTO: 31.4 PG (ref 26–34)
MCHC RBC AUTO-ENTMCNC: 33.3 G/DL (ref 30–36.5)
MCV RBC AUTO: 94.1 FL (ref 80–99)
NRBC # BLD: 0 K/UL (ref 0–0.01)
NRBC BLD-RTO: 0 PER 100 WBC
PLATELET # BLD AUTO: 367 K/UL (ref 150–400)
PMV BLD AUTO: 10.9 FL (ref 8.9–12.9)
RBC # BLD AUTO: 4.4 M/UL (ref 3.8–5.2)
RH FACTOR, EXTERNAL RESULT: NEGATIVE
RPR SER QL: NONREACTIVE
RPR, EXTERNAL RESULT: NORMAL
RUBELLA TITER, EXTERNAL RESULT: NORMAL
RUBV IGG SERPL IA-ACNC: NORMAL IU/ML
SPECIMEN EXP DATE BLD: NORMAL
T. PALLIDUM (SYPHILIS) ANTIBODY, EXTERNAL RESULT: NEGATIVE
WBC # BLD AUTO: 13.4 K/UL (ref 3.6–11)

## 2023-08-26 NOTE — RESULT ENCOUNTER NOTE
PNL reviewed  Update PNL, if needed  Add any missing PN panel labs to [address] message sent if active  Add to PL: rh negative

## 2023-08-28 LAB
HGB A MFR BLD: 97.5 % (ref 96.4–98.8)
HGB A2 MFR BLD COLUMN CHROM: 2.5 % (ref 1.8–3.2)
HGB F MFR BLD: 0 % (ref 0–2)
HGB FRACT BLD-IMP: NORMAL
HGB S MFR BLD: 0 %

## 2023-08-29 ENCOUNTER — PATIENT MESSAGE (OUTPATIENT)
Age: 28
End: 2023-08-29

## 2023-09-01 NOTE — TELEPHONE ENCOUNTER
PT calling back from [facility] message  Name and  verified    Spoke with SUNITHA PT scheduled for 23 at 230p. PT verbalizes understanding.

## 2023-09-05 ENCOUNTER — NURSE ONLY (OUTPATIENT)
Age: 28
End: 2023-09-05

## 2023-09-05 ENCOUNTER — TELEPHONE (OUTPATIENT)
Age: 28
End: 2023-09-05

## 2023-09-05 DIAGNOSIS — Z34.80 SUPERVISION OF OTHER NORMAL PREGNANCY, ANTEPARTUM: Primary | ICD-10-CM

## 2023-09-05 DIAGNOSIS — Z13.79 GENETIC TESTING: ICD-10-CM

## 2023-09-05 NOTE — TELEPHONE ENCOUNTER
Two patient identifiers used      34year old patient  10w3d pregnant    Patient denies vaginal bleeding and cramping    Patient is getting lab work done today and is wondering if she might get updated ultrasound pictures done in the office today    Patient was advised that we do not just do ultrasounds to update pictures     Patient also wanted to change to lab appointment time to later and was advised she had the latest appointment time    Patient verbalized understanding.

## 2023-09-12 LAB
Lab: NEGATIVE
Lab: NORMAL
NTRA CYSTIC FIBROSIS: NEGATIVE
NTRA DUCHENNE/BECKER MUSCULAR DYSTROPHY: NEGATIVE
NTRA FRAGILE X SYNDROME: NEGATIVE
NTRA SPINAL MUSCULAR ATROPHY: NEGATIVE

## 2023-09-12 SDOH — ECONOMIC STABILITY: INCOME INSECURITY: HOW HARD IS IT FOR YOU TO PAY FOR THE VERY BASICS LIKE FOOD, HOUSING, MEDICAL CARE, AND HEATING?: NOT VERY HARD

## 2023-09-12 SDOH — ECONOMIC STABILITY: HOUSING INSECURITY
IN THE LAST 12 MONTHS, WAS THERE A TIME WHEN YOU DID NOT HAVE A STEADY PLACE TO SLEEP OR SLEPT IN A SHELTER (INCLUDING NOW)?: NO

## 2023-09-12 SDOH — ECONOMIC STABILITY: FOOD INSECURITY: WITHIN THE PAST 12 MONTHS, THE FOOD YOU BOUGHT JUST DIDN'T LAST AND YOU DIDN'T HAVE MONEY TO GET MORE.: NEVER TRUE

## 2023-09-12 SDOH — ECONOMIC STABILITY: FOOD INSECURITY: WITHIN THE PAST 12 MONTHS, YOU WORRIED THAT YOUR FOOD WOULD RUN OUT BEFORE YOU GOT MONEY TO BUY MORE.: NEVER TRUE

## 2023-09-12 SDOH — ECONOMIC STABILITY: TRANSPORTATION INSECURITY
IN THE PAST 12 MONTHS, HAS LACK OF TRANSPORTATION KEPT YOU FROM MEETINGS, WORK, OR FROM GETTING THINGS NEEDED FOR DAILY LIVING?: NO

## 2023-09-13 ENCOUNTER — ROUTINE PRENATAL (OUTPATIENT)
Age: 28
End: 2023-09-13

## 2023-09-13 VITALS
BODY MASS INDEX: 33.39 KG/M2 | WEIGHT: 195.6 LBS | SYSTOLIC BLOOD PRESSURE: 115 MMHG | HEART RATE: 116 BPM | HEIGHT: 64 IN | DIASTOLIC BLOOD PRESSURE: 81 MMHG

## 2023-09-13 DIAGNOSIS — Z34.80 PRENATAL CARE OF MULTIGRAVIDA, ANTEPARTUM: Primary | ICD-10-CM

## 2023-09-13 DIAGNOSIS — Z71.1 WORRIED WELL: ICD-10-CM

## 2023-09-13 PROCEDURE — 0502F SUBSEQUENT PRENATAL CARE: CPT | Performed by: OBSTETRICS & GYNECOLOGY

## 2023-09-13 NOTE — PROGRESS NOTES
[address] [facility]  [email or web address]  [phone]    Danni Cruz MD, FACOG    OB/GYN: Kristal Knowles Problem visit    Chief Complaint:   Chief Complaint   Patient presents with    [facility] check - not routine            Worried about pregnancy since not having many symptoms. Wants FHT    Per Rooming Note:  Radha Tucker is a 34 y.o. female presents for a problem visit. 11w4d     The patient is here today for [facility] check. Pt was feeling worried d/t no sx & wanted to make sure baby was ok. Pt reports some nausea. FHT on doppler is 159. Pt wondering if she needs to keep routine prenatal visit on Monday 9/18/23. Pt req printout of all future appts. This is a new problem. This is not a routinely scheduled follow up OB visit. She has not experienced this problem before. She reports the symptoms are is unchanged. Aggravating factors include none. And alleviating factors include none. She does not have other concerns.       PFSH:  Past Medical History:   Diagnosis Date    Adverse effect of anesthesia     Patient stated she woke up \"In terror\" post op    Ankylosing spondylitis ([address]) 06/2020    Asthma     Broken foot 07/2019    right foot, no surgery-wore boot/cast for 6 weeks    Chronic pain     generalized, ankle to back    Migraines     Muscle spasm     Pap smear for cervical cancer screening 11/10/2021    negative    Premature birth     at 28 weeks    Routine Papanicolaou smear 9/27/19 neg    Scoliosis     Tachycardia 09/2021    TMJ (temporomandibular joint syndrome)      Past Surgical History:   Procedure Laterality Date    KNEE ARTHROSCOPY Left 06/2018     Family History   Problem Relation Age of Onset    Other Maternal Grandfather         CFH    Breast Cancer Paternal Grandmother     Breast Cancer Other     Thyroid Disease Sister     No Known Problems Mother     No Known Problems Father     Lupus Sister     Other Sister         common variable immune deficiency    Neuropathy Sister

## 2023-09-13 NOTE — PROGRESS NOTES
Rooming note, OB problem visit    Chief Complaint   Patient presents with    [facility] check - not routine      Archie Gross is a 34 y.o. female presents for a problem visit. 11w4d    The patient is here today for [facility] check. Pt was feeling worried d/t no sx & wanted to make sure baby was ok. Pt reports some nausea. FHT on doppler is 159. Pt wondering if she needs to keep routine prenatal visit on Monday 9/18/23. Pt req printout of all future appts. This is a new problem. This is not a routinely scheduled follow up OB visit. She has not experienced this problem before. She reports the symptoms are is unchanged. Aggravating factors include none. And alleviating factors include none. She does not have other concerns.

## 2023-09-18 ENCOUNTER — ROUTINE PRENATAL (OUTPATIENT)
Age: 28
End: 2023-09-18

## 2023-09-18 VITALS
DIASTOLIC BLOOD PRESSURE: 82 MMHG | HEART RATE: 121 BPM | WEIGHT: 195 LBS | BODY MASS INDEX: 34 KG/M2 | SYSTOLIC BLOOD PRESSURE: 117 MMHG

## 2023-09-18 DIAGNOSIS — Z34.80 PRENATAL CARE OF MULTIGRAVIDA, ANTEPARTUM: Primary | ICD-10-CM

## 2023-09-18 DIAGNOSIS — Z34.80 SUPERVISION OF OTHER NORMAL PREGNANCY, ANTEPARTUM: ICD-10-CM

## 2023-09-18 DIAGNOSIS — Z3A.12 12 WEEKS GESTATION OF PREGNANCY: ICD-10-CM

## 2023-09-18 PROCEDURE — 0502F SUBSEQUENT PRENATAL CARE: CPT | Performed by: OBSTETRICS & GYNECOLOGY

## 2023-09-18 NOTE — PROGRESS NOTES
[address] [facility]  [email or web address]  [phone]  Ramírez Pfeiffer MD, FACOG      Routine follow-up OB visit     Chief Complaint   Patient presents with    Routine Prenatal Visit       Patient Active Problem List    Diagnosis Date Noted    Prenatal care of multigravida, antepartum 2023    Scoliosis     Asthma     Tachycardia 2021    Class 1 obesity due to excess calories without serious comorbidity with body mass index (BMI) of 32.0 to 32.9 in adult 2021    Long-term use of immunosuppressant medication 2020    Ankylosing spondylitis of lumbar region ([address]) 2020    Ankylosing spondylitis ([address]) 2020       SUBJECTIVE:  Pt reports doing well. Food aversion x1  HR elevated, but \"normal\" for her. Usually has sx ~ 130s     OBJECTIVE:  Vitals:    23 0823   BP: 117/82   Pulse: (!) 121     See prenatal flowsheet    Physical Exam:  Gen: no acute distress, normal speech  Cardiac: appears warm and well perfused  Pulm: breathing comfortably on room air  Abd: soft, gravid, non-tender  Fundal height: see prenatal flowsheet  Ext: symmetric; moves all 4 extremities equally    Assessment:  34 y.o.  12w2d   Routine prenatal visit  Encounter Diagnoses   Name Primary? Prenatal care of multigravida, antepartum Yes    Supervision of other normal pregnancy, antepartum     12 weeks gestation of pregnancy        Plan:   Routine follow up OB appointment  Continue routine prenatal care   Routine OB instructions given appropriate to gestational age, see AVS  Reviewed nips/horizon results  Disc AFP nv if desired   Rec to ER if CP/SOB, persistent sx elevated HR    No orders of the defined types were placed in this encounter. Return in about 4 weeks (around 10/16/2023) for TP FOB.       On this date of service, I have spent greater than 10 minutes reviewing the patient's previous notes, test results and planned follow up as well as performing documentation related to

## 2023-09-26 ENCOUNTER — PATIENT MESSAGE (OUTPATIENT)
Age: 28
End: 2023-09-26

## 2023-10-04 ENCOUNTER — OFFICE VISIT (OUTPATIENT)
Age: 28
End: 2023-10-04
Payer: COMMERCIAL

## 2023-10-04 VITALS
SYSTOLIC BLOOD PRESSURE: 111 MMHG | OXYGEN SATURATION: 98 % | BODY MASS INDEX: 34.87 KG/M2 | HEART RATE: 86 BPM | TEMPERATURE: 98 F | WEIGHT: 200 LBS | DIASTOLIC BLOOD PRESSURE: 82 MMHG | RESPIRATION RATE: 16 BRPM

## 2023-10-04 DIAGNOSIS — M45.6 ANKYLOSING SPONDYLITIS LUMBAR REGION (HCC): Primary | ICD-10-CM

## 2023-10-04 PROCEDURE — 99214 OFFICE O/P EST MOD 30 MIN: CPT | Performed by: PEDIATRICS

## 2023-10-04 ASSESSMENT — PATIENT HEALTH QUESTIONNAIRE - PHQ9
SUM OF ALL RESPONSES TO PHQ9 QUESTIONS 1 & 2: 0
SUM OF ALL RESPONSES TO PHQ QUESTIONS 1-9: 0
2. FEELING DOWN, DEPRESSED OR HOPELESS: 0
SUM OF ALL RESPONSES TO PHQ QUESTIONS 1-9: 0
SUM OF ALL RESPONSES TO PHQ QUESTIONS 1-9: 0
1. LITTLE INTEREST OR PLEASURE IN DOING THINGS: 0
SUM OF ALL RESPONSES TO PHQ QUESTIONS 1-9: 0

## 2023-10-04 NOTE — PROGRESS NOTES
Chief Complaint   Patient presents with    Joint Pain     1. Have you been to the ER, urgent care clinic since your last visit? Hospitalized since your last visit? No    2. Have you seen or consulted any other health care providers outside of the [address] [place] [address] since your last visit? Include any pap smears or colon screening.  No

## 2023-10-04 NOTE — PROGRESS NOTES
RHEUMATOLOGY PROBLEM LIST AND CHIEF COMPLAINT  1. Ankylosing Spondylitis - inflammatory back pain, spinous process edema T12-L1     Immunosuppression Screening (5/07/2021): Quantiferon TB: negative  PPD:  Not performed  Hepatitis B: negative  Hepatitis C: negative    Therapy History includes:  Current NSAIDs include: ibuprofen 200 mg  Current DMARD therapy include:   Prior DMARD therapy include: Humira 40 mg every 14 days (7/11/2020 to 7/28/2020), Enbrel 50 mg every Saturday (8/01/2020 to 8/26/2020), Enbrel Mini 50 mg every Saturday (9/05/2020 to 12/2022), Plaquenil (7/2022-7/2023)  Discontinued DMARDs because of inefficacy: None  Discontinued DMARDs because of side effects: Humira (nausea, vomiting, headache, injection site reaction), Enbrel SureClick (injection site reaction)    INTERVAL HISTORY  This is a 34 y.o.  female. Today, the patient complains of pain in the joints. Location: back   Severity: 4 on a scale of 0-10  Timing: all day     Duration: 6 months     Modifying factors:   Context/Associated signs and symptoms: The patient stopped Plaquenil in July since she got pregnant. She is currently 14.5 weeks pregnant. She is complaining of pain and stiffness in her SI joints, lower back, and neck. She has morning stiffness and joint pain after sitting for long periods of time. She has been doing PT. She had an MRI of her pelvis done 6/8/23 showing focal inferior iliac-sided left sacroiliac joint sclerosis.      RHEUMATOLOGY REVIEW OF SYSTEMS   Positives as per history  Negatives as follows:  Keyanna Soho:  Denies unexplained persistent fevers, weight change, chronic fatigue  HEAD/EYES:   Denies eye redness, blurry vision or sudden loss of vision, dry eyes, HA, temporal artery pain  ENT:    Denies oral/nasal ulcers, recurrent sinus infections, dry mouth  RESPIRATORY:  No pleuritic pain, history of pleural effusions, hemoptysis, exertional dyspnea  CARDIOVASCULAR: Denies chest pain, history of

## 2023-10-16 ENCOUNTER — ROUTINE PRENATAL (OUTPATIENT)
Age: 28
End: 2023-10-16

## 2023-10-16 ENCOUNTER — TELEPHONE (OUTPATIENT)
Age: 28
End: 2023-10-16

## 2023-10-16 VITALS
WEIGHT: 203.8 LBS | BODY MASS INDEX: 35.54 KG/M2 | DIASTOLIC BLOOD PRESSURE: 86 MMHG | HEART RATE: 112 BPM | SYSTOLIC BLOOD PRESSURE: 124 MMHG

## 2023-10-16 DIAGNOSIS — Z34.80 PRENATAL CARE OF MULTIGRAVIDA, ANTEPARTUM: Primary | ICD-10-CM

## 2023-10-16 DIAGNOSIS — O99.210 OBESITY IN PREGNANCY: ICD-10-CM

## 2023-10-16 DIAGNOSIS — M45.9 ANKYLOSING SPONDYLITIS, UNSPECIFIED SITE OF SPINE (HCC): ICD-10-CM

## 2023-10-16 DIAGNOSIS — Z34.80 SUPERVISION OF OTHER NORMAL PREGNANCY, ANTEPARTUM: ICD-10-CM

## 2023-10-16 PROCEDURE — 0502F SUBSEQUENT PRENATAL CARE: CPT | Performed by: OBSTETRICS & GYNECOLOGY

## 2023-10-16 NOTE — PROGRESS NOTES
[address] [facility]  [email or web address]  [phone]  Fracisco Franco MD, FACOG      Routine follow-up OB visit     Chief Complaint   Patient presents with    Routine Prenatal Visit       Patient Active Problem List    Diagnosis Date Noted    Prenatal care of multigravida, antepartum 2023    Scoliosis     Asthma     Tachycardia 2021    Class 1 obesity due to excess calories without serious comorbidity with body mass index (BMI) of 32.0 to 32.9 in adult 2021    Long-term use of immunosuppressant medication 2020    Ankylosing spondylitis of lumbar region ([address]) 2020    Ankylosing spondylitis ([address]) 2020       SUBJECTIVE:  Pt reports ? FM  Saw Rhueyesi was told to restart injections  Cimzia advised. OBJECTIVE:  Vitals:    10/16/23 0821   BP: 124/86   Pulse: (!) 112     See prenatal flowsheet    Physical Exam:  Gen: no acute distress, normal speech  Cardiac: appears warm and well perfused  Pulm: breathing comfortably on room air  Abd: soft, gravid, non-tender  Fundal height: see prenatal flowsheet  Ext: symmetric; moves all 4 extremities equally    Assessment:  34 y.o.  16w2d   Routine prenatal visit  Encounter Diagnoses   Name Primary?     Prenatal care of multigravida, antepartum Yes    Supervision of other normal pregnancy, antepartum     Ankylosing spondylitis, unspecified site of spine ([address])     Obesity in pregnancy        Plan:   Routine follow up OB appointment  Continue routine prenatal care   Routine OB instructions given appropriate to gestational age, see AVS  Bowel regimen add fiber, notify MD if NI    Reviewed current pregnancy risks (no clear fetal risk) of cimzia but registry still open  Refer to MFM for FS for rheumatology: AS, cimzia risks in pregnancy (if she starts ) and obesity    Orders Placed This Encounter   Procedures    Alpha Fetoprotein, Maternal     Standing Status:   Future     Number of Occurrences:   1     Standing Expiration

## 2023-10-16 NOTE — TELEPHONE ENCOUNTER
Calling M to schedule pt for 20wk FS    [place] pt for 930am on 11/16/23, pt will see Dr. Johnny Kaiser at [facility]

## 2023-10-18 LAB
AFP INTERP SERPL-IMP: NORMAL
AFP INTERP SERPL-IMP: NORMAL
AFP MOM SERPL: 0.69
AFP SERPL-MCNC: 20.1 NG/ML
AGE AT DELIVERY: 26.5 YR
COMMENT: NORMAL
GA METHOD: NORMAL
GA: 16.2 WEEKS
IDDM PATIENT QL: NO
Lab: NORMAL
MULTIPLE PREGNANCY: NO
NEURAL TUBE DEFECT RISK FETUS: NORMAL %

## 2023-10-18 NOTE — RESULT ENCOUNTER NOTE
The AFP results are low risk. Please update chart/history/prenatal labs, if needed. [facility] message sent if active.

## 2023-11-03 ENCOUNTER — ROUTINE PRENATAL (OUTPATIENT)
Age: 28
End: 2023-11-03

## 2023-11-03 VITALS
SYSTOLIC BLOOD PRESSURE: 116 MMHG | DIASTOLIC BLOOD PRESSURE: 83 MMHG | HEART RATE: 94 BPM | WEIGHT: 206.4 LBS | BODY MASS INDEX: 35.99 KG/M2

## 2023-11-03 DIAGNOSIS — Z34.80 PRENATAL CARE OF MULTIGRAVIDA, ANTEPARTUM: ICD-10-CM

## 2023-11-03 DIAGNOSIS — R00.2 PALPITATIONS: Primary | ICD-10-CM

## 2023-11-03 DIAGNOSIS — I49.9 IRREGULAR HEART RATE: ICD-10-CM

## 2023-11-03 NOTE — PROGRESS NOTES
[address] [facility]  [email or web address]  [phone]    Barb Conrad MD, FACOG    OB/GYN: OB Problem visit    Chief Complaint:   Chief Complaint   Patient presents with    Other     Julia Dye in pregnancy          More palpitations x 2 wks  130-140 on apple watch  150s max. Has tried electrolytes before. Saw Dr. Chaya Ibrahim: cardiology. Home ekg: sinus tachy. Per Rooming Note:       Lucinda Park is a 34 y.o. female presents for a problem visit. 18w6d     The patient is reporting having: elevated heart rate per apple watch (130s - 150s) has seen a cardiologist before as was being watched for POTS. Patient reports family history of POTS. Patient states she can feel palpitations but denies chest pain. This is a new problem. This is not a routinely scheduled follow up OB visit. She has not experienced this problem before. She reports the symptoms are is unchanged. Aggravating factors include none. And alleviating factors include none. She does not have other concerns.        PFSH:  Past Medical History:   Diagnosis Date    Adverse effect of anesthesia     Patient stated she woke up \"In terror\" post op    Ankylosing spondylitis ([address]) 06/2020    Asthma     Broken foot 07/2019    right foot, no surgery-wore boot/cast for 6 weeks    Chronic pain     generalized, ankle to back    Migraines     Muscle spasm     Pap smear for cervical cancer screening 11/10/2021    negative    Premature birth     at 28 weeks    Routine Papanicolaou smear 9/27/19 neg    Scoliosis     Tachycardia 09/2021    TMJ (temporomandibular joint syndrome)      Past Surgical History:   Procedure Laterality Date    KNEE ARTHROSCOPY Left 06/2018     Family History   Problem Relation Age of Onset    Other Maternal Grandfather         CFH    Breast Cancer Paternal Grandmother     Breast Cancer Other     Thyroid Disease Sister     No Known Problems Mother     No Known Problems Father     Lupus Sister     Other

## 2023-11-03 NOTE — PROGRESS NOTES
Rooming note, OB problem visit    Chief Complaint   Patient presents with    Other     Justino Shine in pregnancy      Tosha Merrill is a 34 y.o. female presents for a problem visit. 18w6d    The patient is reporting having: elevated heart rate per apple watch (130s - 150s) has seen a cardiologist before as was being watched for POTS. Patient reports family history of POTS. Patient states she can feel palpitations but denies chest pain. This is a new problem. This is not a routinely scheduled follow up OB visit. She has not experienced this problem before. She reports the symptoms are is unchanged. Aggravating factors include none. And alleviating factors include none. She does not have other concerns.

## 2023-11-04 LAB
ERYTHROCYTE [DISTWIDTH] IN BLOOD BY AUTOMATED COUNT: 13.3 % (ref 11.5–14.5)
HCT VFR BLD AUTO: 40.2 % (ref 35–47)
HGB BLD-MCNC: 13.1 G/DL (ref 11.5–16)
MCH RBC QN AUTO: 31.7 PG (ref 26–34)
MCHC RBC AUTO-ENTMCNC: 32.6 G/DL (ref 30–36.5)
MCV RBC AUTO: 97.3 FL (ref 80–99)
NRBC # BLD: 0 K/UL (ref 0–0.01)
NRBC BLD-RTO: 0 PER 100 WBC
PLATELET # BLD AUTO: 338 K/UL (ref 150–400)
PMV BLD AUTO: 10.9 FL (ref 8.9–12.9)
RBC # BLD AUTO: 4.13 M/UL (ref 3.8–5.2)
TSH SERPL DL<=0.05 MIU/L-ACNC: 1.23 UIU/ML (ref 0.36–3.74)
WBC # BLD AUTO: 13.7 K/UL (ref 3.6–11)

## 2023-11-09 ENCOUNTER — OFFICE VISIT (OUTPATIENT)
Age: 28
End: 2023-11-09
Payer: COMMERCIAL

## 2023-11-09 VITALS
SYSTOLIC BLOOD PRESSURE: 118 MMHG | HEART RATE: 114 BPM | BODY MASS INDEX: 35.17 KG/M2 | HEIGHT: 64 IN | DIASTOLIC BLOOD PRESSURE: 76 MMHG | OXYGEN SATURATION: 96 % | WEIGHT: 206 LBS

## 2023-11-09 DIAGNOSIS — R00.0 TACHYCARDIA, UNSPECIFIED: Primary | ICD-10-CM

## 2023-11-09 PROCEDURE — 99213 OFFICE O/P EST LOW 20 MIN: CPT | Performed by: STUDENT IN AN ORGANIZED HEALTH CARE EDUCATION/TRAINING PROGRAM

## 2023-11-09 PROCEDURE — 93000 ELECTROCARDIOGRAM COMPLETE: CPT | Performed by: STUDENT IN AN ORGANIZED HEALTH CARE EDUCATION/TRAINING PROGRAM

## 2023-11-09 NOTE — PROGRESS NOTES
Malu Delgado, DO      [address]  [facility], [facility]      Office [phone],[facility] [phone]                Dennis Barnett is a 34 y.o. female presents the office for f/up evaluation of tachycardia. Assessment/Recommendations:         Diagnosis Orders   1. Tachycardia, unspecified  EKG 12 Lead              Hx of  inappropriate sinus tachycardia, exacerbated by pregnancy. - aggressive hydration and sodium intake. Based on prior holter monitor, I do not think repeat ambulatory event monitor is necessary at this time. History of ankylosing spondylitis       Family history of Jean-Danlos, sister. She has a history of hypermobility but currently does not have the diagnosis of EDS. - echo 10/21 showed normal aortic root size. Due to pregnancy, recommend repeat echo to assure normal caliber aorta. If she developed interval enlargement, may affect vaginal delivery   - recommend patient consider genetic testing for EDS         Primary Care Physician- Maryana Vasquez, DO      Follow-up as needed      Subjective:  Currently 20 weeks pregnant. Apple watch showing increase in heart rates. No palpitations.        Current Outpatient Medications:     Fexofenadine HCl (ALLEGRA ALLERGY PO), Take by mouth, Disp: , Rfl:     Prenatal Vit-Fe Fumarate-FA (PRENATAL PO), Take by mouth, Disp: , Rfl:     albuterol sulfate HFA (PROVENTIL;VENTOLIN;PROAIR) 108 (90 Base) MCG/ACT inhaler, Inhale 2 puffs into the lungs every 4 hours as needed, Disp: , Rfl:         Review of Symptoms:   Pertinent Positive: neg   Pertinent Negative: No chest pain, dyspnea on exertion, shortness of breath, orthopnea, PND   All Other systems reviewed and are negative for a Comprehensive ROS (10+)      Physical Exam     Vitals:    11/09/23 1434   BP: 118/76   Site: Left Upper Arm   Position: Sitting   Pulse: (!) 114   SpO2: 96%   Weight: 93.4 kg (206 lb)   Height: 1.613 m (5' 3.5\")

## 2023-11-09 NOTE — PROGRESS NOTES
Markus Patel is a 34 y.o. female    had concerns including Other (Tachy ). Vitals:    11/09/23 1434   BP: 118/76   Site: Left Upper Arm   Position: Sitting   Pulse: (!) 114   SpO2: 96%   Weight: 93.4 kg (206 lb)   Height: 1.613 m (5' 3.5\")        Faster heart rate per her apple watch  19weeks pregnant     Chest pain No    SOB some SOB -     Dizziness No    Swelling No    Refills No    Covid Vaccinated No      1. Have you been to the ER, urgent care clinic since your last visit? Hospitalized since your last visit? no    2. Have you seen or consulted any other health care providers outside of the [address] [place] [address] since your last visit? Include any pap smears or colon screening.  no

## 2023-11-16 ENCOUNTER — ROUTINE PRENATAL (OUTPATIENT)
Age: 28
End: 2023-11-16

## 2023-11-16 ENCOUNTER — TELEMEDICINE (OUTPATIENT)
Age: 28
End: 2023-11-16

## 2023-11-16 VITALS — DIASTOLIC BLOOD PRESSURE: 79 MMHG | SYSTOLIC BLOOD PRESSURE: 113 MMHG | WEIGHT: 207 LBS | BODY MASS INDEX: 36.09 KG/M2

## 2023-11-16 VITALS — DIASTOLIC BLOOD PRESSURE: 67 MMHG | HEART RATE: 114 BPM | SYSTOLIC BLOOD PRESSURE: 107 MMHG

## 2023-11-16 DIAGNOSIS — E66.9 OBESITY, CLASS II, BMI 35-39.9: ICD-10-CM

## 2023-11-16 DIAGNOSIS — Z3A.20 20 WEEKS GESTATION OF PREGNANCY: ICD-10-CM

## 2023-11-16 DIAGNOSIS — Z3A.20 20 WEEKS GESTATION OF PREGNANCY: Primary | ICD-10-CM

## 2023-11-16 DIAGNOSIS — M45.9 ANKYLOSING SPONDYLITIS, UNSPECIFIED SITE OF SPINE (HCC): ICD-10-CM

## 2023-11-16 DIAGNOSIS — R00.0 TACHYCARDIA: ICD-10-CM

## 2023-11-16 DIAGNOSIS — O99.210 OBESITY IN PREGNANCY: ICD-10-CM

## 2023-11-16 DIAGNOSIS — Z34.80 PRENATAL CARE OF MULTIGRAVIDA, ANTEPARTUM: Primary | ICD-10-CM

## 2023-11-16 DIAGNOSIS — Z91.89 AT RISK FOR GESTATIONAL DIABETES MELLITUS: ICD-10-CM

## 2023-11-16 DIAGNOSIS — Z87.39 HISTORY OF ANKYLOSING SPONDYLITIS: Primary | ICD-10-CM

## 2023-11-16 PROCEDURE — 0502F SUBSEQUENT PRENATAL CARE: CPT | Performed by: OBSTETRICS & GYNECOLOGY

## 2023-11-16 NOTE — PROCEDURES
PATIENT: ANÍBAL CRAWFORD   -  : 1995   -  DOS:2023   -  INTERPRETING PROVIDER:Sruthi Mercedes,   Indication  ========    Anatomy, Obesity, Maternal Ankylosing Spondylitis    Method  ======    Transabdominal ultrasound examination. View: Good view, limited by fetal position    Dating  ======    LMP on: 2023  GA by LMP 20 w + 4 d  JARRETT by LMP: 3/31/2024  Previous Ultrasound on: 2023  Type of prior assessment: GA  GA at prior assessment date 8 w + 5 d  GA by previous U/S 20 w + 5 d  JARRETT by previous Ultrasound: 3/30/2024  Ultrasound examination on: 2023  GA by U/S based upon: AC, BPD, Femur, HC  GA by U/S 21 w + 0 d  JARRETT by U/S: 3/28/2024  Assigned: based on the LMP, selected on 2023  Assigned GA 20 w + 4 d  Assigned JARRETT: 3/31/2024    Fetal Growth Overview  =================    Exam date        GA              BPD (mm)          HC (mm)           AC (mm)               FL (mm)             HL (mm)            EFW (g)  2023        20w 4d        50.7     79%        184    50%        162.6     69%        32.9    31%        30.5     31%        383    61%    Fetal Biometry  ============    Standard  BPD 50.7 mm 21w 3d 79% Hadlock  OFD 64.1 mm 21w 5d 87% Jonna  .0 mm 20w 5d 50% Hadlock  Cerebellum tr 20.5 mm 19w 4d 33% Hill  .6 mm 21w 2d 69% Hadlock  Femur 32.9 mm 20w 2d 31% Hadlock  Humerus 30.5 mm 20w 0d 31% Jonna   g 20w 5d 61% Hadlock  EFW (lb) 0 lb  EFW (oz) 14 oz  EFW by: Hadlock (BPD-HC-AC-FL)  Extended   5.7 mm  CM 5.0 mm  44% Nicolaides  Head / Face / Neck  Nasal bone: present  Other Structures   bpm    General Evaluation  ==============    Cardiac activity present.  bpm. Fetal movements: visualized. Presentation: Breech  Placenta: Placental site: posterior, fundal  Umbilical cord: Cord vessels: 3 vessel cord.  Insertion site: placental insertion normal  Amniotic fluid: Amount of AF: normal. MVP 4.0 cm    Fetal

## 2023-11-16 NOTE — PROGRESS NOTES
co-pays. This Virtual Visit was conducted with patient's (and/or legal guardian's) consent. Patient identification was verified, and a caregiver was present when appropriate.    The patient was located at [facility] (Appt Department): [address] [place] [address], [facility]  Provider was located at [facility] ([address] [place] [address] Dept): [address] [place] [address]

## 2023-11-16 NOTE — PROGRESS NOTES
[facility]  [email or web address]  [phone]  Kylah Finn MD, FACOG      Routine follow-up OB visit     Chief Complaint   Patient presents with    Routine Prenatal Visit       Patient Active Problem List    Diagnosis Date Noted    Prenatal care of multigravida, antepartum 2023    Scoliosis     Asthma     Tachycardia 2021    Class 1 obesity due to excess calories without serious comorbidity with body mass index (BMI) of 32.0 to 32.9 in adult 2021    Long-term use of immunosuppressant medication 2020    Ankylosing spondylitis of lumbar region [facility]) 2020    Ankylosing spondylitis ([address]) 2020       SUBJECTIVE:  Pt reports saw MFM today, rec early glucola, pt amenable to do today due to BMI and FH. Saw cardiologist: Echo [address] OBJECTIVE:  Vitals:    23 1000   BP: 113/79     See prenatal flowsheet    Physical Exam:  Gen: no acute distress, normal speech  Cardiac: appears warm and well perfused  Pulm: breathing comfortably on room air  Abd: soft, gravid, non-tender  Fundal height: see prenatal flowsheet  Ext: symmetric; moves all 4 extremities equally    Assessment:  34 y.o.  20w5d   Routine prenatal visit  Encounter Diagnoses   Name Primary? Prenatal care of multigravida, antepartum Yes    20 weeks gestation of pregnancy        Plan:   Routine follow up OB appointment  Continue routine prenatal care   Routine OB instructions given appropriate to gestational age, see AVS  Early glucola     No orders of the defined types were placed in this encounter. Return in about 4 weeks (around 2023) for TP FOB, Lab Today. On this date of service, I have spent greater than 10 minutes reviewing the patient's previous notes, test results and planned follow up as well as performing documentation related to this visit.   More than 50% of this time was spent face to face with the patient discussing the plan of care, diagnosis and

## 2023-11-17 ENCOUNTER — TELEPHONE (OUTPATIENT)
Age: 28
End: 2023-11-17

## 2023-11-17 LAB — GLUCOSE 1H P 100 G GLC PO SERPL-MCNC: 157 MG/DL (ref 65–140)

## 2023-11-17 NOTE — TELEPHONE ENCOUNTER
Two patient identifiers used      34year old patient  24w5d pregnant    Patient calling to discuss abnormal one hour GTT and next steps        Append Comments   Seen    Your one hour glucose screen is elevated. We need to do a three hour fasting diabetes test as soon as possible to screen for gestational diabetes. Please do not wait until your next scheduled OB visit. Please contact one of my nurses during business hours to discuss your results and my recommendations. Thank you for letting us participate in your medical care and please contact the office with any questions or concerns. Ida Kan MD, FACOG     Nutrition in pregnancy info:   [email or web address]     3-Hour Glucose Test  When you arrive, the technician will draw blood to measure your baseline \"fasting blood glucose level\". You will be asked to drink a more concentrated solution of the glucose drink than was used in the initial Gestational Diabetes Screening test. Your blood will be drawn and tested every hour for the next three hours. You will be notified of your results within one week by MyChart. If 2 or more levels of the 4 levels are elevated, it is consistent with a diagnosis of gestational diabetes. Written by Ida Kan MD on 2023 12:22 PM EST    This nurse discussed the three hour GTT and provided instructions for prior to the procedure and patient was place on the schedule to be seen on 2023 at 8:30am    Patient verbalized understanding.

## 2023-11-17 NOTE — RESULT ENCOUNTER NOTE
Elev 1 hr   add to PL; elevated glucola  Rec 3hr asap  Notify pt if not scheduled within 1 week or [facility] message not read

## 2023-11-21 ENCOUNTER — NURSE ONLY (OUTPATIENT)
Age: 28
End: 2023-11-21

## 2023-11-21 DIAGNOSIS — Z91.89 AT RISK FOR GESTATIONAL DIABETES MELLITUS: ICD-10-CM

## 2023-11-21 DIAGNOSIS — Z34.80 PRENATAL CARE OF MULTIGRAVIDA, ANTEPARTUM: ICD-10-CM

## 2023-11-21 DIAGNOSIS — R73.09 ELEVATED GLUCOSE TOLERANCE TEST: Primary | ICD-10-CM

## 2023-11-21 LAB
GESTATIONAL 3HR GTT: ABNORMAL
GLUCOSE 1H P 100 G GLC PO SERPL-MCNC: 188 MG/DL (ref 65–180)
GLUCOSE 2 HOUR: 189 MG/DL (ref 65–155)
GLUCOSE P FAST SERPL-MCNC: 82 MG/DL (ref 65–95)
GLUCOSE, 3 HOUR: 127 MG/DL (ref 65–140)

## 2023-11-22 ENCOUNTER — TELEPHONE (OUTPATIENT)
Age: 28
End: 2023-11-22

## 2023-11-22 DIAGNOSIS — R73.09 ELEVATED GLUCOSE TOLERANCE TEST: ICD-10-CM

## 2023-11-22 DIAGNOSIS — M45.9 ANKYLOSING SPONDYLITIS, UNSPECIFIED SITE OF SPINE (HCC): ICD-10-CM

## 2023-11-22 DIAGNOSIS — O24.410 DIET CONTROLLED GESTATIONAL DIABETES MELLITUS (GDM) IN SECOND TRIMESTER: Primary | ICD-10-CM

## 2023-11-22 DIAGNOSIS — Z34.80 PRENATAL CARE OF MULTIGRAVIDA, ANTEPARTUM: ICD-10-CM

## 2023-11-22 NOTE — TELEPHONE ENCOUNTER
Pt calling about recent abnormal 3 hour results. She reviewed Dr. Jaun Cordova msmaddie and she wanted to speak with [facility].

## 2023-11-22 NOTE — TELEPHONE ENCOUNTER
Called pt & discussed results of 3hr gtt & next steps with MFM, advised to review weblinks. Safemail sent to Dr. Russell Rendon regarding pt's last MFM experience, pt prefers to stay with [facility] for now.

## 2023-11-27 ENCOUNTER — PATIENT MESSAGE (OUTPATIENT)
Age: 28
End: 2023-11-27

## 2023-11-27 DIAGNOSIS — O24.419 GDM (GESTATIONAL DIABETES MELLITUS): ICD-10-CM

## 2023-11-27 RX ORDER — LANCETS 30 GAUGE
EACH MISCELLANEOUS
Qty: 200 EACH | Refills: 3 | Status: SHIPPED | OUTPATIENT
Start: 2023-11-27

## 2023-11-27 RX ORDER — PEN NEEDLE, DIABETIC 31 GX5/16"
NEEDLE, DISPOSABLE MISCELLANEOUS
Qty: 200 EACH | Refills: 3 | Status: SHIPPED | OUTPATIENT
Start: 2023-11-27

## 2023-11-27 RX ORDER — GLUCOSAMINE HCL/CHONDROITIN SU 500-400 MG
CAPSULE ORAL
Qty: 200 STRIP | Refills: 3 | Status: SHIPPED | OUTPATIENT
Start: 2023-11-27

## 2023-11-27 RX ORDER — BLOOD-GLUCOSE METER
EACH MISCELLANEOUS
Qty: 1 KIT | Refills: 0 | Status: SHIPPED | OUTPATIENT
Start: 2023-11-27

## 2023-11-28 RX ORDER — BREAST PUMP
1 EACH MISCELLANEOUS AS NEEDED
Qty: 1 EACH | Refills: 0 | Status: SHIPPED | OUTPATIENT
Start: 2023-11-28

## 2023-11-29 ENCOUNTER — OFFICE VISIT (OUTPATIENT)
Age: 28
End: 2023-11-29
Payer: COMMERCIAL

## 2023-11-29 DIAGNOSIS — O24.410 DIET CONTROLLED GESTATIONAL DIABETES MELLITUS (GDM) IN SECOND TRIMESTER: Primary | ICD-10-CM

## 2023-11-29 PROCEDURE — G0108 DIAB MANAGE TRN  PER INDIV: HCPCS | Performed by: DIETITIAN, REGISTERED

## 2023-11-29 NOTE — PROGRESS NOTES
[facility] Program for Diabetes Health  Diabetes Self-Management Education & Support Program  Encounter Note      SUMMARY  Diabetes self-care management training was completed related to gestational diabetes,. Vicenta Haider will return on  . EVALUATION:  Marycruz and HUMBERTO met today to discuss her new dx of GDM. Currently 22+ weeks,  w/ family hx of DM. She is currently walking on her treadmill for 30 minutes each day. Reports weight gain ~10 lb to date. We discussed appropriate weight gain goal. Instruction was provided on the GDM, decreasing risk for Type 2 and realistic expectations, potential need for medication intervention if diet is unsuccessful. Reviewed her typical intakes and used to discuss diet guidelines. Vicenta Haider shared that she has been less active since her conception and has restarted her walking. RECOMMENDATIONS:  1) begin testing with dinner tonight and proceed with fasting and 2 hour post meals. 2) continue with using treadmill for exercise   3) implement your meal plan guidelines starting with dinner tonight  4) call with any questions and contact provider if BG are > target. TOPICS DISCUSSED TODAY:  WHAT CAN I EAT? 61  HOW CAN BLOOD GLUCOSE MONITORING HELP ME? 30      Next provider visit is scheduled for 23         DATE DSMES TOPIC EVALUATION     2023 WHAT CAN I EAT? General principles   Determining a healthy weight   Nutritional terms & tools   Healthy Plate method   Carbohydrate Counting   Reading food labels   Free apps   Pregnancy recommendations      The participant   Uses Healthy Plate principles in constructing meals: Yes  Reads food labels in choosing acceptable foods:  Yes    Shreyas reviewed plate method for nutrient categories and foods that do need to be included in the meal but not in carb counts for the meal.   Instructed using Choose Your Foods reference and food labels to meet the following recommendations for carb counts:   Breakfast 30g, Lunch

## 2023-12-05 ENCOUNTER — OFFICE VISIT (OUTPATIENT)
Age: 28
End: 2023-12-05

## 2023-12-05 DIAGNOSIS — O24.410 DIET CONTROLLED GESTATIONAL DIABETES MELLITUS (GDM) IN SECOND TRIMESTER: Primary | ICD-10-CM

## 2023-12-08 NOTE — PROGRESS NOTES
[facility] Program for Diabetes Health  Diabetes Self-Management Education & Support Program  Encounter Note      SUMMARY  Diabetes self-care management training was completed related to healthy eating related to GDM. The participant will return as needed to help with controlling her BG values. EVALUATION:  Lalo Noel returned with food diary and BG log. She has been doing excellent with logging and identifying meals when her BG was out of target. She shared that there are times she is hungry with meals and between. When elevated post prandial - mostly associated with late meals or foods when portions or carbohydrates have been hard to identify. She is planning to walk routinely in the afternoons and has not started this yet. RECOMMENDATIONS:  1) work towards planning meals to the carbohydrate guidelines given - at times she has lower intakes that provided. 2) snack as needed planning for at least 15 g carbohydrate and 1 oz of protein. 3) add non starchy vegetables if you are hungry at the meal.  4) work on including walking to help with stress and also BG post prandial.    TOPICS DISCUSSED TODAY:  WHAT CAN I EAT? 30  HOW DO I FIGURE OUT WHAT'S INFLUENCING MY BLOOD GLUCOSES? 30      Next provider visit is scheduled for 12/11/23 and with educator by phone on Friday. DATE DSMES TOPIC EVALUATION     125/23 WHAT CAN I EAT? General principles   Determining a healthy weight   Nutritional terms & tools   Healthy Plate method   Carbohydrate Counting   Reading food labels   Free apps   Pregnancy recommendations    Lalo Noel returns with her BG logs - she has some values that are >120 mg/dl 2 hour pp. She has been successful with identifying the foods that may be affecting these values. Reports she is struggling at times to know if she has had adequate CHO at the meal.   We reviewed meals together - guiding her assessment and what/how much to add to meet needs to support pregnancy.  She is experiencing some

## 2023-12-11 ENCOUNTER — ROUTINE PRENATAL (OUTPATIENT)
Age: 28
End: 2023-12-11

## 2023-12-11 ENCOUNTER — ROUTINE PRENATAL (OUTPATIENT)
Age: 28
End: 2023-12-11
Payer: COMMERCIAL

## 2023-12-11 VITALS — SYSTOLIC BLOOD PRESSURE: 113 MMHG | DIASTOLIC BLOOD PRESSURE: 77 MMHG | HEART RATE: 105 BPM

## 2023-12-11 VITALS — WEIGHT: 208 LBS | BODY MASS INDEX: 36.27 KG/M2 | SYSTOLIC BLOOD PRESSURE: 118 MMHG | DIASTOLIC BLOOD PRESSURE: 83 MMHG

## 2023-12-11 DIAGNOSIS — Z3A.24 24 WEEKS GESTATION OF PREGNANCY: Primary | ICD-10-CM

## 2023-12-11 DIAGNOSIS — R00.0 TACHYCARDIA: ICD-10-CM

## 2023-12-11 DIAGNOSIS — O24.410 DIET CONTROLLED GESTATIONAL DIABETES MELLITUS (GDM) IN SECOND TRIMESTER: ICD-10-CM

## 2023-12-11 DIAGNOSIS — M45.6 ANKYLOSING SPONDYLITIS OF LUMBAR REGION (HCC): ICD-10-CM

## 2023-12-11 DIAGNOSIS — Z3A.24 24 WEEKS GESTATION OF PREGNANCY: ICD-10-CM

## 2023-12-11 DIAGNOSIS — Z34.80 PRENATAL CARE OF MULTIGRAVIDA, ANTEPARTUM: Primary | ICD-10-CM

## 2023-12-11 DIAGNOSIS — E66.9 OBESITY, CLASS II, BMI 35-39.9: ICD-10-CM

## 2023-12-11 PROBLEM — E66.812 OBESITY, CLASS II, BMI 35-39.9: Status: ACTIVE | Noted: 2023-12-11

## 2023-12-11 PROBLEM — O99.212 OBESITY AFFECTING PREGNANCY IN SECOND TRIMESTER: Status: ACTIVE | Noted: 2023-12-11

## 2023-12-11 PROCEDURE — 76816 OB US FOLLOW-UP PER FETUS: CPT | Performed by: OBSTETRICS & GYNECOLOGY

## 2023-12-11 PROCEDURE — 0502F SUBSEQUENT PRENATAL CARE: CPT | Performed by: OBSTETRICS & GYNECOLOGY

## 2023-12-11 PROCEDURE — 99212 OFFICE O/P EST SF 10 MIN: CPT

## 2023-12-11 NOTE — PROCEDURES
PATIENT: ANÍBAL CRAWFORD   -  : 1995   -  DOS:2023   -  INTERPRETING PROVIDER:Yonas Vidal,   Indication  ========    Obesity, Maternal Ankylosing Spondylitis    Method  ======    Transabdominal ultrasound examination. View: Sufficient    Pregnancy  =========    Curry pregnancy, curry pregnancy. Number of fetuses: 1    Dating  ======    LMP on: 2023  GA by LMP 24 w + 1 d  JARRETT by LMP: 3/31/2024  Previous Ultrasound on: 2023  Type of prior assessment: GA  GA at prior assessment date 8 w + 5 d  GA by previous U/S 24 w + 2 d  JARRETT by previous Ultrasound: 3/30/2024  Ultrasound examination on: 2023  GA by U/S based upon: [facility], [place], BPD, Femur, HC  GA by U/S 24 w + 3 d  JARRETT by U/S: 3/29/2024  Assigned: based on the LMP, selected on 2023  Assigned GA 24 w + 1 d  Assigned JARRETT: 3/31/2024    Fetal Biometry  ============    Standard  BPD 60.0 mm 24w 3d 55% Hadlock  OFD 76.6 mm 25w 1d 83% Jonna  .0 mm 23w 6d 21% Hadlock  Cerebellum tr 25.9 mm 23w 2d 42% Hill  .5 mm 25w 0d 67% Hadlock  Femur 43.5 mm 24w 2d 41% Hadlock  Humerus 38.9 mm 23w 6d 28% Jonna   g 24w 2d 60% Hadlock  EFW (lb) 1 lb  EFW (oz) 9 oz  EFW by: Hadlock (BPD-HC-AC-FL)  Extended   4.2 mm  Other Structures   bpm    General Evaluation  ==============    Cardiac activity present.  bpm. Fetal movements: visualized. Presentation: Cephalic  Placenta: Placental site: posterior, fundal  Umbilical cord: Cord vessels: 3 vessel cord. Insertion site: placental insertion normal  Amniotic fluid: Amount of AF: normal. MVP 3.0 cm.  HEIDI 10.1 cm. Q1 2.4 cm, Q2 2.6 cm, Q3 2.1 cm, Q4 3.0 cm    Fetal Anatomy  ===========    Cranium: normal  Lateral ventricles: normal  Midline falx: normal  Cavum septi pellucidi: visualized  Cerebellum: normal  4-chamber view: normal  RVOT view: normal  LVOT view: normal  3-vessel view: normal  3-vessel-trachea view: NOT VISUALIZED  Heart / Thorax  Interventricular

## 2023-12-11 NOTE — PROGRESS NOTES
[address] [facility]  [email or web address]/  [phone]  Ida Kan MD, FACOG      Routine follow-up OB visit     Chief Complaint   Patient presents with    Routine Prenatal Visit       Patient Active Problem List    Diagnosis Date Noted    Prenatal care of multigravida, antepartum 2023    Scoliosis     Asthma     Tachycardia 2021    Class 1 obesity due to excess calories without serious comorbidity with body mass index (BMI) of 32.0 to 32.9 in adult 2021    Long-term use of immunosuppressant medication 2020    Ankylosing spondylitis of lumbar region [facility]) 2020    Ankylosing spondylitis ([address]) 2020       SUBJECTIVE:  Pt reports finger numbness and pelvic pressure. Monitoring BS. OBJECTIVE:  Vitals:    23 0952   BP: 118/83     See prenatal flowsheet    Physical Exam:  Gen: no acute distress, normal speech  Cardiac: appears warm and well perfused  Pulm: breathing comfortably on room air  Abd: soft, gravid, non-tender  Fundal height: see prenatal flowsheet  Ext: symmetric; moves all 4 extremities equally    Assessment:  34 y.o.  24w2d   Routine prenatal visit  Encounter Diagnoses   Name Primary? Prenatal care of multigravida, antepartum Yes    24 weeks gestation of pregnancy     Diet controlled gestational diabetes mellitus (GDM) in second trimester        Plan:   Routine follow up OB appointment  Continue routine prenatal care   Routine OB instructions given appropriate to gestational age, see AVS  Keep GDM/MFM fu  Rec wrist splints/ support belt: add to PT referral PRN. No orders of the defined types were placed in this encounter. No follow-ups on file. On this date of service, I have spent greater than 10 minutes reviewing the patient's previous notes, test results and planned follow up as well as performing documentation related to this visit.   More than 50% of this time was spent face to face with the patient

## 2023-12-15 ENCOUNTER — TELEPHONE (OUTPATIENT)
Age: 28
End: 2023-12-15

## 2023-12-15 DIAGNOSIS — O24.419 GDM (GESTATIONAL DIABETES MELLITUS): ICD-10-CM

## 2023-12-15 RX ORDER — INSULIN LISPRO 100 [IU]/ML
6 INJECTION, SOLUTION INTRAVENOUS; SUBCUTANEOUS
Qty: 15 ML | Refills: 1 | Status: SHIPPED | OUTPATIENT
Start: 2023-12-15

## 2023-12-15 RX ORDER — INSULIN HUMAN 100 [IU]/ML
6 INJECTION, SUSPENSION SUBCUTANEOUS
Qty: 15 ML | Refills: 1 | Status: SHIPPED | OUTPATIENT
Start: 2023-12-15

## 2023-12-15 NOTE — TELEPHONE ENCOUNTER
Late entry:  return call to [facility] 12/14/23 0264 re: elevated fasting blood glucose 12/11/23- 12/14/23 are there following 107, 112, 103, 110 and elevated  2 hr pp glucose 145, 136, 143, 144. Pt is s/p diabetic ed, has tried dietary and exercise modifications. I have prescribed 6 units of NPH at bedtime and 6 units of Lispro with breakfast.  A referral has been sent to diabetic ed for insulin instructions. Hypoglycemic instructions reviewed. Sharyn Wilkins agrees with this this plan and all questions were answered. She expresses concerns about being allergic to latex and the possibility of latex being in the insulin pen caps. I advised her to speak with the pharmacist when picking up her medication to ensure it is latex free. , Sharyn Wilkins is to submit log to our office for review via [address] [place] [address].  Oliver Barriga [facility]

## 2024-01-08 ENCOUNTER — NURSE ONLY (OUTPATIENT)
Age: 29
End: 2024-01-08

## 2024-01-08 ENCOUNTER — ROUTINE PRENATAL (OUTPATIENT)
Age: 29
End: 2024-01-08
Payer: COMMERCIAL

## 2024-01-08 ENCOUNTER — TELEPHONE (OUTPATIENT)
Age: 29
End: 2024-01-08

## 2024-01-08 VITALS — SYSTOLIC BLOOD PRESSURE: 121 MMHG | HEART RATE: 116 BPM | DIASTOLIC BLOOD PRESSURE: 83 MMHG

## 2024-01-08 VITALS
BODY MASS INDEX: 36.2 KG/M2 | DIASTOLIC BLOOD PRESSURE: 79 MMHG | HEART RATE: 132 BPM | SYSTOLIC BLOOD PRESSURE: 120 MMHG | WEIGHT: 207.6 LBS

## 2024-01-08 VITALS
SYSTOLIC BLOOD PRESSURE: 120 MMHG | BODY MASS INDEX: 36.09 KG/M2 | DIASTOLIC BLOOD PRESSURE: 79 MMHG | HEART RATE: 132 BPM | WEIGHT: 207 LBS

## 2024-01-08 DIAGNOSIS — O26.899 CARPAL TUNNEL SYNDROME DURING PREGNANCY: ICD-10-CM

## 2024-01-08 DIAGNOSIS — Z23 NEED FOR TDAP VACCINATION: ICD-10-CM

## 2024-01-08 DIAGNOSIS — M45.9 ANKYLOSING SPONDYLITIS, UNSPECIFIED SITE OF SPINE (HCC): ICD-10-CM

## 2024-01-08 DIAGNOSIS — Z3A.28 28 WEEKS GESTATION OF PREGNANCY: ICD-10-CM

## 2024-01-08 DIAGNOSIS — Z34.80 PRENATAL CARE OF MULTIGRAVIDA, ANTEPARTUM: Primary | ICD-10-CM

## 2024-01-08 DIAGNOSIS — I49.9 MATERNAL ARRHYTHMIA AFFECTING PREGNANCY IN SECOND TRIMESTER, ANTEPARTUM: ICD-10-CM

## 2024-01-08 DIAGNOSIS — M54.9 BACK PAIN DURING PREGNANCY: ICD-10-CM

## 2024-01-08 DIAGNOSIS — O99.212 OBESITY AFFECTING PREGNANCY IN SECOND TRIMESTER, UNSPECIFIED OBESITY TYPE: ICD-10-CM

## 2024-01-08 DIAGNOSIS — O99.891 BACK PAIN DURING PREGNANCY: ICD-10-CM

## 2024-01-08 DIAGNOSIS — Z29.13 NEED FOR RHOGAM DUE TO RH NEGATIVE MOTHER: ICD-10-CM

## 2024-01-08 DIAGNOSIS — M45.6 ANKYLOSING SPONDYLITIS OF LUMBAR REGION (HCC): ICD-10-CM

## 2024-01-08 DIAGNOSIS — O35.EXX0 ENCOUNTER FOR REPEAT ULTRASOUND OF FETAL PYELECTASIS IN SINGLETON PREGNANCY, ANTEPARTUM: ICD-10-CM

## 2024-01-08 DIAGNOSIS — O24.414 GESTATIONAL DIABETES REQUIRING INSULIN: ICD-10-CM

## 2024-01-08 DIAGNOSIS — O24.414 INSULIN CONTROLLED GESTATIONAL DIABETES MELLITUS (GDM) DURING PREGNANCY, ANTEPARTUM: Primary | ICD-10-CM

## 2024-01-08 DIAGNOSIS — G56.00 CARPAL TUNNEL SYNDROME DURING PREGNANCY: ICD-10-CM

## 2024-01-08 DIAGNOSIS — O99.412 MATERNAL ARRHYTHMIA AFFECTING PREGNANCY IN SECOND TRIMESTER, ANTEPARTUM: ICD-10-CM

## 2024-01-08 LAB
BLOOD BANK CMNT PATIENT-IMP: NORMAL
BLOOD GROUP ANTIBODIES SERPL: NORMAL
ERYTHROCYTE [DISTWIDTH] IN BLOOD BY AUTOMATED COUNT: 13 % (ref 11.5–14.5)
HCT VFR BLD AUTO: 37.4 % (ref 35–47)
HGB BLD-MCNC: 12.8 G/DL (ref 11.5–16)
MCH RBC QN AUTO: 31.3 PG (ref 26–34)
MCHC RBC AUTO-ENTMCNC: 34.2 G/DL (ref 30–36.5)
MCV RBC AUTO: 91.4 FL (ref 80–99)
NRBC # BLD: 0 K/UL (ref 0–0.01)
NRBC BLD-RTO: 0 PER 100 WBC
PLATELET # BLD AUTO: 318 K/UL (ref 150–400)
PMV BLD AUTO: 11.5 FL (ref 8.9–12.9)
RBC # BLD AUTO: 4.09 M/UL (ref 3.8–5.2)
WBC # BLD AUTO: 11.8 K/UL (ref 3.6–11)

## 2024-01-08 PROCEDURE — 0502F SUBSEQUENT PRENATAL CARE: CPT | Performed by: OBSTETRICS & GYNECOLOGY

## 2024-01-08 PROCEDURE — 90715 TDAP VACCINE 7 YRS/> IM: CPT | Performed by: OBSTETRICS & GYNECOLOGY

## 2024-01-08 PROCEDURE — 99213 OFFICE O/P EST LOW 20 MIN: CPT | Performed by: OBSTETRICS & GYNECOLOGY

## 2024-01-08 PROCEDURE — 90471 IMMUNIZATION ADMIN: CPT | Performed by: OBSTETRICS & GYNECOLOGY

## 2024-01-08 PROCEDURE — 96372 THER/PROPH/DIAG INJ SC/IM: CPT | Performed by: OBSTETRICS & GYNECOLOGY

## 2024-01-08 PROCEDURE — 76816 OB US FOLLOW-UP PER FETUS: CPT | Performed by: OBSTETRICS & GYNECOLOGY

## 2024-01-08 NOTE — PROGRESS NOTES
[facility]  [email or web address]  [phone]  Jeannette Cheung MD, FACOG      Routine follow-up OB visit     Chief Complaint   Patient presents with    Routine Prenatal Visit       Patient Active Problem List    Diagnosis Date Noted    Obesity affecting pregnancy in second trimester 2023    Diet controlled gestational diabetes mellitus (GDM) in second trimester 2023    Obesity, Class II, BMI 35-39.9 2023    Prenatal care of multigravida, antepartum 2023    Scoliosis     Asthma     Tachycardia 2021    Class 1 obesity due to excess calories without serious comorbidity with body mass index (BMI) of 32.0 to 32.9 in adult 2021    Long-term use of immunosuppressant medication 2020    Ankylosing spondylitis of lumbar region (HCC) 2020    Ankylosing spondylitis (HCC) 2020       SUBJECTIVE:  Pt reports doing well  Still having numbness in hands despite wrist splints.  Sees PT  Wondering about ALA     OBJECTIVE:  Vitals:    24 0838   BP: 120/79   Pulse: (!) 132     See prenatal flowsheet    Physical Exam:  Gen: no acute distress, normal speech  Cardiac: appears warm and well perfused  Pulm: breathing comfortably on room air  Abd: soft, gravid, non-tender  Fundal height: see prenatal flowsheet  Ext: symmetric; moves all 4 extremities equally    Assessment:  28 y.o.  28w2d   Routine prenatal visit  Encounter Diagnoses   Name Primary?    Prenatal care of multigravida, antepartum Yes    28 weeks gestation of pregnancy     Ankylosing spondylitis, unspecified site of spine (HCC)     Gestational diabetes requiring insulin     Need for rhogam due to Rh negative mother     Need for Tdap vaccination        Plan:   Routine follow up OB appointment  Continue routine prenatal care   Routine OB instructions given appropriate to gestational age, see AVS  Consent reviewed  Disc IOL  Disc r/b/a of induction and pitocin use and increase risk of

## 2024-01-08 NOTE — PROGRESS NOTES
Please see my progress or consultation note in Viewpoint filed under the Media and/or Imaging Tab in Epic. Thank you.

## 2024-01-08 NOTE — PROGRESS NOTES
After obtaining consent, and per orders of Dr. Cheung, injection of tdap given in Right deltoid by Kamilah Malik MA. Patient instructed to remain in clinic for 20 minutes afterwards, and to report any adverse reaction to me immediately.      After obtaining consent, and per orders of Dr. Cheung, injection of RHOGAM given in right glute by Kamilah Malik MA. Patient instructed to remain in clinic for 20 minutes afterwards, and to report any adverse reaction to me immediately.        Pt went to [facility] afterwards.

## 2024-01-08 NOTE — PROCEDURES
PATIENT: ANÍBAL CRAWFORD   -  : 1995   -  DOS:2024   -  INTERPRETING PROVIDER:Cedric Navarro,   Indication  ========    Gestational diabetes, A2 v. B/T2 DM, Obesity, Maternal Ankylosing Spondylitis, History of maternal tachycardia.    Method  ======    Transabdominal ultrasound examination. View: Sufficient    Pregnancy  =========    Curry pregnancy. Number of fetuses: 1    Dating  ======    LMP on: 2023  GA by LMP 28 w + 1 d  JARRETT by LMP: 3/31/2024  Previous Ultrasound on: 2023  Type of prior assessment: GA  GA at prior assessment date 8 w + 5 d  GA by previous U/S 28 w + 2 d  JARRETT by previous Ultrasound: 3/30/2024  Ultrasound examination on: 2024  GA by U/S based upon: AC, BPD, Femur, HC  GA by U/S 28 w + 4 d  JARRETT by U/S: 3/28/2024  Assigned: based on the LMP, selected on 2023  Assigned GA 28 w + 1 d  Assigned JARRETT: 3/31/2024    Fetal Biometry  ============    Standard  BPD 71.6 mm 28w 5d 58% Hadlock  OFD 94.9 mm 30w 4d 96% Jonna  .3 mm 28w 5d 38% Hadlock  Cerebellum tr 32.3 mm 27w 4d 34% Hill  .9 mm 29w 4d 83% Hadlock  Femur 51.4 mm 27w 3d 18% Hadlock  EFW 1,279 g 28w 3d 61% Hadlock  EFW (lb) 2 lb  EFW (oz) 13 oz  EFW by: Hadlock (BPD-HC-AC-FL)  Extended   3.5 mm  Other Structures   bpm    General Evaluation  ==============    Cardiac activity present.  bpm. Fetal movements: visualized. Presentation: Cephalic  Placenta: Placental site: posterior, fundal  Umbilical cord: Cord vessels: 3 vessel cord. Insertion site: placental insertion normal  Amniotic fluid: Amount of AF: normal. MVP 3.6 cm    Fetal Anatomy  ===========    Cranium: normal  Lateral ventricles: normal  Midline falx: normal  Cavum septi pellucidi: visualized  Cerebellum: normal  4-chamber view: normal  RVOT view: documented previously  LVOT view: normal  3-vessel view: normal  3-vessel-trachea view: normal  Heart / Thorax  Interventricular septum: normal  Cardiac rhythm: regular

## 2024-01-08 NOTE — TELEPHONE ENCOUNTER
Spoke with patient to in regards to OMG message. Patient had a few questions in regards to office visit note from today.     Fetal Echo: Ordered for GDM on Insulin, Referral has been placed and Rowena from [facility] will contact patient in regards to scheduling.    Weekly US: Explained this in normal protocol for weekly testing to begin at 32 weeks for GDM on Insulin    GDM: Per patient her BG readings are 2 hr post prandials. Note states 1 hours. MD notified.     At this time all questions were answered.

## 2024-01-08 NOTE — TELEPHONE ENCOUNTER
----- Message from Marycruz Tamez sent at 1/8/2024 11:34 AM EST -----  Regarding: Glucose Management  Contact: [phone]  Good morning, Brandy I think you are in this message. I have a few questions after todays appt. Would you be able to give me a call sometime this week to discuss?

## 2024-01-16 ENCOUNTER — TELEPHONE (OUTPATIENT)
Age: 29
End: 2024-01-16

## 2024-01-16 NOTE — TELEPHONE ENCOUNTER
TC to Marycruz Tamez ID verified.  Re:  [employer] message to review glucose log.  50% Fasting BG elevated on current regimen.  Breakfast and lunch glucose well controlled.  50% dinner glucose elevated due to increase carbohydrates at dinner.  Marycruz would like to attempt diet modification prior medication modification at dinner.    Current insulin regimen NPH 16U qhs Lispro 8U with breakfast She denies hypogycemic episodes.  Hypoglycemia instructions reviewed.     New insulin regimen NPH 18U qhs Lispro 8U with breakast    Maxi is agreeable to this plan.  Kick count instructions reviewed.  All questions answered.  Marycruz is to submit log to our clinic weekly. Kate Juarez [facility]

## 2024-01-19 ENCOUNTER — TELEPHONE (OUTPATIENT)
Age: 29
End: 2024-01-19

## 2024-01-19 NOTE — TELEPHONE ENCOUNTER
Two patient identifiers used    28 year old year old  29w6d pregnant    Patient denies vaginal bleeding,RoM,contractions , and is feeling the baby move    Patient calling to report that last night she had period like cramping that went away but has started back up again this afternoon as well as some back pain  Patient rates the discomfort at 3-4 on the pain scale of 1-10.    Patient denies urinary frequency and urgency    Patient reports she is drinking water    Patient was advised per Dr. Leal to increase po fluids, rest , can take tylenol and if her symptoms change ie she starts to have contractions to contact the on call MD and go to labor and delivery      Patient verbalized understanding.

## 2024-01-23 ASSESSMENT — PATIENT HEALTH QUESTIONNAIRE - PHQ9
6. FEELING BAD ABOUT YOURSELF - OR THAT YOU ARE A FAILURE OR HAVE LET YOURSELF OR YOUR FAMILY DOWN: 0
6. FEELING BAD ABOUT YOURSELF - OR THAT YOU ARE A FAILURE OR HAVE LET YOURSELF OR YOUR FAMILY DOWN: NOT AT ALL
5. POOR APPETITE OR OVEREATING: 0
8. MOVING OR SPEAKING SO SLOWLY THAT OTHER PEOPLE COULD HAVE NOTICED. OR THE OPPOSITE, BEING SO FIGETY OR RESTLESS THAT YOU HAVE BEEN MOVING AROUND A LOT MORE THAN USUAL: 0
4. FEELING TIRED OR HAVING LITTLE ENERGY: SEVERAL DAYS
8. MOVING OR SPEAKING SO SLOWLY THAT OTHER PEOPLE COULD HAVE NOTICED. OR THE OPPOSITE - BEING SO FIDGETY OR RESTLESS THAT YOU HAVE BEEN MOVING AROUND A LOT MORE THAN USUAL: NOT AT ALL
SUM OF ALL RESPONSES TO PHQ QUESTIONS 1-9: 2
SUM OF ALL RESPONSES TO PHQ QUESTIONS 1-9: 2
10. IF YOU CHECKED OFF ANY PROBLEMS, HOW DIFFICULT HAVE THESE PROBLEMS MADE IT FOR YOU TO DO YOUR WORK, TAKE CARE OF THINGS AT HOME, OR GET ALONG WITH OTHER PEOPLE: 0
9. THOUGHTS THAT YOU WOULD BE BETTER OFF DEAD, OR OF HURTING YOURSELF: 0
3. TROUBLE FALLING OR STAYING ASLEEP: SEVERAL DAYS
7. TROUBLE CONCENTRATING ON THINGS, SUCH AS READING THE NEWSPAPER OR WATCHING TELEVISION: NOT AT ALL
SUM OF ALL RESPONSES TO PHQ QUESTIONS 1-9: 2
7. TROUBLE CONCENTRATING ON THINGS, SUCH AS READING THE NEWSPAPER OR WATCHING TELEVISION: 0
3. TROUBLE FALLING OR STAYING ASLEEP: 1
SUM OF ALL RESPONSES TO PHQ QUESTIONS 1-9: 2
2. FEELING DOWN, DEPRESSED OR HOPELESS: 0
10. IF YOU CHECKED OFF ANY PROBLEMS, HOW DIFFICULT HAVE THESE PROBLEMS MADE IT FOR YOU TO DO YOUR WORK, TAKE CARE OF THINGS AT HOME, OR GET ALONG WITH OTHER PEOPLE: NOT DIFFICULT AT ALL
SUM OF ALL RESPONSES TO PHQ QUESTIONS 1-9: 2
SUM OF ALL RESPONSES TO PHQ9 QUESTIONS 1 & 2: 0
2. FEELING DOWN, DEPRESSED OR HOPELESS: NOT AT ALL
5. POOR APPETITE OR OVEREATING: NOT AT ALL
1. LITTLE INTEREST OR PLEASURE IN DOING THINGS: NOT AT ALL
1. LITTLE INTEREST OR PLEASURE IN DOING THINGS: 0
9. THOUGHTS THAT YOU WOULD BE BETTER OFF DEAD, OR OF HURTING YOURSELF: NOT AT ALL
4. FEELING TIRED OR HAVING LITTLE ENERGY: 1

## 2024-01-24 ENCOUNTER — ROUTINE PRENATAL (OUTPATIENT)
Age: 29
End: 2024-01-24

## 2024-01-24 VITALS
HEART RATE: 98 BPM | SYSTOLIC BLOOD PRESSURE: 116 MMHG | HEIGHT: 64 IN | WEIGHT: 210.8 LBS | DIASTOLIC BLOOD PRESSURE: 80 MMHG | BODY MASS INDEX: 35.99 KG/M2

## 2024-01-24 DIAGNOSIS — Z34.80 PRENATAL CARE OF MULTIGRAVIDA, ANTEPARTUM: ICD-10-CM

## 2024-01-24 DIAGNOSIS — Z3A.30 30 WEEKS GESTATION OF PREGNANCY: Primary | ICD-10-CM

## 2024-01-24 DIAGNOSIS — O24.414 GESTATIONAL DIABETES REQUIRING INSULIN: ICD-10-CM

## 2024-01-24 LAB
CREAT UR-MCNC: 108 MG/DL
CREAT UR-MCNC: 109 MG/DL
EST. AVERAGE GLUCOSE BLD GHB EST-MCNC: 105 MG/DL
HBA1C MFR BLD: 5.3 % (ref 4–5.6)
MICROALBUMIN UR-MCNC: 1.97 MG/DL
MICROALBUMIN/CREAT UR-RTO: 18 MG/G (ref 0–30)
PROT UR-MCNC: 36 MG/DL (ref 0–11.9)
PROT/CREAT UR-RTO: 0.3

## 2024-01-24 PROCEDURE — 0502F SUBSEQUENT PRENATAL CARE: CPT | Performed by: OBSTETRICS & GYNECOLOGY

## 2024-01-24 NOTE — PROGRESS NOTES
[facility]  [email or web address]/  [phone]  Jeannette Cheung MD, FACOG      Routine follow-up OB visit     Chief Complaint   Patient presents with    Routine Prenatal Visit       Patient Active Problem List    Diagnosis Date Noted    Obesity affecting pregnancy in second trimester 2023    Diet controlled gestational diabetes mellitus (GDM) in second trimester 2023    Obesity, Class II, BMI 35-39.9 2023    Prenatal care of multigravida, antepartum 2023    Scoliosis     Asthma     Tachycardia 2021    Class 1 obesity due to excess calories without serious comorbidity with body mass index (BMI) of 32.0 to 32.9 in adult 2021    Long-term use of immunosuppressant medication 2020    Ankylosing spondylitis of lumbar region (HCC) 2020    Ankylosing spondylitis (HCC) 2020       SUBJECTIVE:  Pt reports cramping last weekend, improved.  With lower back pain.      OBJECTIVE:  Vitals:    24 0821   BP: 116/80   Pulse: 98     See prenatal flowsheet    Physical Exam:  Gen: no acute distress, normal speech  Cardiac: appears warm and well perfused  Pulm: breathing comfortably on room air  Abd: soft, gravid, non-tender  Fundal height: see prenatal flowsheet  Ext: symmetric; moves all 4 extremities equally    Assessment:  28 y.o.  30w4d   Routine prenatal visit  Encounter Diagnoses   Name Primary?    30 weeks gestation of pregnancy Yes    Prenatal care of multigravida, antepartum     Gestational diabetes requiring insulin        Plan:   Routine follow up OB appointment  Continue routine prenatal care   Routine OB instructions given appropriate to gestational age, see AVS  Labs per MFM  Disc planned MFM fu, lab recs and fetal echo  PTL precautions.    Orders Placed This Encounter   Procedures    Culture, Urine     Standing Status:   Future     Standing Expiration Date:   2025    Hemoglobin A1C     Standing Status:   Future     Standing

## 2024-01-25 LAB
BACTERIA SPEC CULT: NORMAL
SERVICE CMNT-IMP: NORMAL

## 2024-02-04 ASSESSMENT — PATIENT HEALTH QUESTIONNAIRE - PHQ9
SUM OF ALL RESPONSES TO PHQ QUESTIONS 1-9: 2
5. POOR APPETITE OR OVEREATING: 0
9. THOUGHTS THAT YOU WOULD BE BETTER OFF DEAD, OR OF HURTING YOURSELF: 0
7. TROUBLE CONCENTRATING ON THINGS, SUCH AS READING THE NEWSPAPER OR WATCHING TELEVISION: 0
4. FEELING TIRED OR HAVING LITTLE ENERGY: 1
SUM OF ALL RESPONSES TO PHQ QUESTIONS 1-9: 2
3. TROUBLE FALLING OR STAYING ASLEEP: SEVERAL DAYS
7. TROUBLE CONCENTRATING ON THINGS, SUCH AS READING THE NEWSPAPER OR WATCHING TELEVISION: NOT AT ALL
SUM OF ALL RESPONSES TO PHQ QUESTIONS 1-9: 2
SUM OF ALL RESPONSES TO PHQ QUESTIONS 1-9: 2
9. THOUGHTS THAT YOU WOULD BE BETTER OFF DEAD, OR OF HURTING YOURSELF: NOT AT ALL
10. IF YOU CHECKED OFF ANY PROBLEMS, HOW DIFFICULT HAVE THESE PROBLEMS MADE IT FOR YOU TO DO YOUR WORK, TAKE CARE OF THINGS AT HOME, OR GET ALONG WITH OTHER PEOPLE: 0
4. FEELING TIRED OR HAVING LITTLE ENERGY: SEVERAL DAYS
6. FEELING BAD ABOUT YOURSELF - OR THAT YOU ARE A FAILURE OR HAVE LET YOURSELF OR YOUR FAMILY DOWN: 0
1. LITTLE INTEREST OR PLEASURE IN DOING THINGS: NOT AT ALL
2. FEELING DOWN, DEPRESSED OR HOPELESS: 0
6. FEELING BAD ABOUT YOURSELF - OR THAT YOU ARE A FAILURE OR HAVE LET YOURSELF OR YOUR FAMILY DOWN: NOT AT ALL
8. MOVING OR SPEAKING SO SLOWLY THAT OTHER PEOPLE COULD HAVE NOTICED. OR THE OPPOSITE - BEING SO FIDGETY OR RESTLESS THAT YOU HAVE BEEN MOVING AROUND A LOT MORE THAN USUAL: NOT AT ALL
SUM OF ALL RESPONSES TO PHQ9 QUESTIONS 1 & 2: 0
1. LITTLE INTEREST OR PLEASURE IN DOING THINGS: 0
2. FEELING DOWN, DEPRESSED OR HOPELESS: NOT AT ALL
5. POOR APPETITE OR OVEREATING: NOT AT ALL
10. IF YOU CHECKED OFF ANY PROBLEMS, HOW DIFFICULT HAVE THESE PROBLEMS MADE IT FOR YOU TO DO YOUR WORK, TAKE CARE OF THINGS AT HOME, OR GET ALONG WITH OTHER PEOPLE: NOT DIFFICULT AT ALL
8. MOVING OR SPEAKING SO SLOWLY THAT OTHER PEOPLE COULD HAVE NOTICED. OR THE OPPOSITE, BEING SO FIGETY OR RESTLESS THAT YOU HAVE BEEN MOVING AROUND A LOT MORE THAN USUAL: 0
3. TROUBLE FALLING OR STAYING ASLEEP: 1
SUM OF ALL RESPONSES TO PHQ QUESTIONS 1-9: 2

## 2024-02-05 ENCOUNTER — ROUTINE PRENATAL (OUTPATIENT)
Age: 29
End: 2024-02-05
Payer: COMMERCIAL

## 2024-02-05 ENCOUNTER — ROUTINE PRENATAL (OUTPATIENT)
Age: 29
End: 2024-02-05

## 2024-02-05 VITALS
DIASTOLIC BLOOD PRESSURE: 74 MMHG | SYSTOLIC BLOOD PRESSURE: 107 MMHG | WEIGHT: 212.2 LBS | BODY MASS INDEX: 36.23 KG/M2 | HEART RATE: 108 BPM | HEIGHT: 64 IN

## 2024-02-05 VITALS — HEART RATE: 92 BPM | SYSTOLIC BLOOD PRESSURE: 108 MMHG | DIASTOLIC BLOOD PRESSURE: 73 MMHG

## 2024-02-05 DIAGNOSIS — Z3A.32 32 WEEKS GESTATION OF PREGNANCY: Primary | ICD-10-CM

## 2024-02-05 DIAGNOSIS — Z34.80 PRENATAL CARE OF MULTIGRAVIDA, ANTEPARTUM: ICD-10-CM

## 2024-02-05 DIAGNOSIS — M45.9 ANKYLOSING SPONDYLITIS, UNSPECIFIED SITE OF SPINE (HCC): ICD-10-CM

## 2024-02-05 DIAGNOSIS — O24.414 GESTATIONAL DIABETES REQUIRING INSULIN: ICD-10-CM

## 2024-02-05 DIAGNOSIS — O99.212 OBESITY AFFECTING PREGNANCY IN SECOND TRIMESTER, UNSPECIFIED OBESITY TYPE: ICD-10-CM

## 2024-02-05 DIAGNOSIS — O24.414 INSULIN CONTROLLED GESTATIONAL DIABETES MELLITUS (GDM) DURING PREGNANCY, ANTEPARTUM: Primary | ICD-10-CM

## 2024-02-05 DIAGNOSIS — O24.414 GESTATIONAL DIABETES MELLITUS (GDM) REQUIRING INSULIN: Primary | ICD-10-CM

## 2024-02-05 DIAGNOSIS — E66.9 OBESITY, CLASS II, BMI 35-39.9: ICD-10-CM

## 2024-02-05 PROCEDURE — 0502F SUBSEQUENT PRENATAL CARE: CPT | Performed by: OBSTETRICS & GYNECOLOGY

## 2024-02-05 PROCEDURE — 76816 OB US FOLLOW-UP PER FETUS: CPT | Performed by: OBSTETRICS & GYNECOLOGY

## 2024-02-05 PROCEDURE — 76818 FETAL BIOPHYS PROFILE W/NST: CPT | Performed by: OBSTETRICS & GYNECOLOGY

## 2024-02-05 PROCEDURE — 99212 OFFICE O/P EST SF 10 MIN: CPT

## 2024-02-05 NOTE — PROGRESS NOTES
ASSESSMENT/PLAN:  1. Insulin controlled gestational diabetes mellitus (GDM) during pregnancy, antepartum  2. Obesity affecting pregnancy in second trimester, unspecified obesity type    1. A2GDM v. T2DM/Obesity  -glucose log reviewed: blood sugars well controlled on  20 units of NPH at bedtime.  - urinalysis with reflex, and urine for microalbuminuria: WNL   -23 urine protein to creatine ratio 0.3mg/dl  - per pt 24 hour urine pro/Cr submitted this morning, 24  -Continue to work on dietary plan per clinical guidelines  -Participate in a reasonable exercise program, such as walking a mile a day.  -Continue FSBG X4 daily (FBS, 2 hr pc after each main meal): goals are for FBS to be 85-90 mg% and 2 hr pc's <120 mg%.  -Submit Glucose logs for review weekly   -BPP 10/10 reviewed   -s/p fetal echo  see report in EPIC, WNL.   -Kick count, PIH, Labor precautions reviewed     2. Maternal Ankylosing Spondylitis  -Patient is undergoing physical therapy. She has not had anesthesia consultation.       Please see Viewpoint for ultrasound findings    Subjective   Marycruz Tamez (:  1995) is a 28 y.o. female,Established patient, here for evaluation of the following chief complaint(s):  A2GDM/Obesity      Current symptoms/problems include None    Medication compliance:  compliant most of the time  Diabetic diet compliance:  compliant most of the time,    Home blood sugar records: reviewed, scanned into chart  Any episodes of hypoglycemia? no   reports that she has never smoked. She has never been exposed to tobacco smoke. She has never used smokeless tobacco.   Daily Aspirin? no    Lab Results   Component Value Date    LABA1C 5.3 2024    LABA1C 5.4 2021     Lab Results   Component Value Date    CREATININE 0.70 10/16/2023     Lab Results   Component Value Date    ALT 19 10/16/2023    AST 11 (L) 10/16/2023     No results found for: \"CHOL\", \"TRIG\", \"HDL\", \"LDLCALC\", \"LDLDIRECT\"              Objective

## 2024-02-05 NOTE — PROGRESS NOTES
[facility]  [email or web address]  [phone]  Jeannette Cheung MD, FACOG      Routine follow-up OB visit     Chief Complaint   Patient presents with    Routine Prenatal Visit       Patient Active Problem List    Diagnosis Date Noted    Obesity affecting pregnancy in second trimester 2023    Diet controlled gestational diabetes mellitus (GDM) in second trimester 2023    Obesity, Class II, BMI 35-39.9 2023    Prenatal care of multigravida, antepartum 2023    Scoliosis     Asthma     Tachycardia 2021    Class 1 obesity due to excess calories without serious comorbidity with body mass index (BMI) of 32.0 to 32.9 in adult 2021    Long-term use of immunosuppressant medication 2020    Ankylosing spondylitis of lumbar region (HCC) 2020    Ankylosing spondylitis (HCC) 2020       SUBJECTIVE:  Pt reports doing well, good FM, BS control, occ cramping     OBJECTIVE:  Vitals:    24 0852   BP: 107/74   Pulse:      See prenatal flowsheet    Physical Exam:  Gen: no acute distress, normal speech  Cardiac: appears warm and well perfused  Pulm: breathing comfortably on room air  Abd: soft, gravid, non-tender  Fundal height: see prenatal flowsheet  Ext: symmetric; moves all 4 extremities equally    Assessment:  28 y.o.  32w2d   Routine prenatal visit  Encounter Diagnoses   Name Primary?    32 weeks gestation of pregnancy Yes    Prenatal care of multigravida, antepartum     Gestational diabetes requiring insulin        Plan:   Routine follow up OB appointment  Continue routine prenatal care   Routine OB instructions given appropriate to gestational age, see AVS  Reviewed LD consent form  Repeat BP  PIH Precautions   24 hr urine brought in today  IOL consent reviewed  Continue good BS control and MFM fu  Reviewed PTL precautions        Watch for some warning signs of preeclampsia and contact the office with any concerns:  Swelling of face or

## 2024-02-06 NOTE — PROCEDURES
PATIENT: ANÍBAL CRAWFORD   -  : 1995   -  DOS:2024   -  INTERPRETING PROVIDER:Maverick Mercedes,   Indication  ========    Gestational diabetes, A2 v. B/T2 DM, Obesity, Maternal Ankylosing Spondylitis, History of maternal tachycardia.    Method  ======    Transabdominal ultrasound examination. View: Sufficient    Pregnancy  =========    Curry pregnancy. Number of fetuses: 1    Dating  ======    LMP on: 2023  GA by LMP 32 w + 1 d  JARRETT by LMP: 3/31/2024  Previous Ultrasound on: 2023  Type of prior assessment: GA  GA at prior assessment date 8 w + 5 d  GA by previous U/S 32 w + 2 d  JARRETT by previous Ultrasound: 3/30/2024  Ultrasound examination on: 2024  GA by U/S based upon: AC, BPD, Femur, HC  GA by U/S 33 w + 0 d  JARRETT by U/S: 3/25/2024  Assigned: based on the LMP, selected on 2023  Assigned GA 32 w + 1 d  Assigned JARRETT: 3/31/2024    Fetal Biometry  ============    Standard  BPD 82.4 mm 33w 1d 71% Hadlock  .1 mm 35w 0d 95% Jonna  .8 mm 33w 1d 37% Hadlock  .8 mm 33w 5d 87% Hadlock  Femur 61.8 mm 32w 0d 34% Hadlock  Humerus 55.3 mm 32w 1d 53% Jonna  EFW 2,117 g 32w 5d 70% Hadlock  EFW (lb) 4 lb  EFW (oz) 11 oz  EFW by: Hadlock (BPD-HC-AC-FL)  Extended   5.5 mm  Other Structures   bpm    General Evaluation  ==============    Cardiac activity present.  bpm. Fetal movements: visualized. Presentation: Cephalic  Placenta: Placental site: posterior, fundal  Umbilical cord: Cord vessels: 3 vessel cord. Insertion site: placental insertion normal  Amniotic fluid: Amount of AF: normal. MVP 4.7 cm. HEIDI 15.6 cm. Q1 4.1 cm, Q2 3.1 cm, Q3 4.7 cm, Q4 3.7 cm    Fetal Anatomy  ===========    Lateral ventricles: normal  Midline falx: visualized  4-chamber view: normal  Heart / Thorax  Interventricular septum: normal  Cardiac rhythm: regular (normal)  Stomach: normal  Kidneys: normal  Bladder: normal  Wants to know fetal sex: yes    Biophysical

## 2024-02-07 LAB
PROT 24H UR-MRATE: 355 MG/24 HR (ref 30–150)
PROT UR-MCNC: 18.7 MG/DL

## 2024-02-12 ENCOUNTER — ROUTINE PRENATAL (OUTPATIENT)
Age: 29
End: 2024-02-12
Payer: COMMERCIAL

## 2024-02-12 VITALS — DIASTOLIC BLOOD PRESSURE: 80 MMHG | SYSTOLIC BLOOD PRESSURE: 111 MMHG | HEART RATE: 102 BPM

## 2024-02-12 DIAGNOSIS — O24.414 INSULIN CONTROLLED GESTATIONAL DIABETES MELLITUS (GDM) DURING PREGNANCY, ANTEPARTUM: Primary | ICD-10-CM

## 2024-02-12 PROCEDURE — 99213 OFFICE O/P EST LOW 20 MIN: CPT | Performed by: OBSTETRICS & GYNECOLOGY

## 2024-02-12 PROCEDURE — 76818 FETAL BIOPHYS PROFILE W/NST: CPT | Performed by: OBSTETRICS & GYNECOLOGY

## 2024-02-12 NOTE — PROCEDURES
PATIENT: ANÍBAL CRAWFORD   -  : 1995   -  DOS:2024   -  INTERPRETING PROVIDER:Elroy Shepherd,   Indication  ========    Gestational diabetes, A2 v. B/T2 DM, Obesity, Maternal Ankylosing Spondylitis, History of maternal tachycardia.    Method  ======    Transabdominal ultrasound examination. View: Sufficient    Pregnancy  =========    Curry pregnancy. Number of fetuses: 1    Dating  ======    LMP on: 2023  GA by LMP 33 w + 1 d  JARRETT by LMP: 3/31/2024  Previous Ultrasound on: 2023  Type of prior assessment: GA  GA at prior assessment date 8 w + 5 d  GA by previous U/S 33 w + 2 d  JARRETT by previous Ultrasound: 3/30/2024  Assigned: based on the LMP, selected on 2023  Assigned GA 33 w + 1 d  Assigned JARRETT: 3/31/2024    General Evaluation  ==============    Cardiac activity present.  bpm. Fetal movements: visualized. Presentation: Cephalic  Placenta: Placental site: posterior, fundal  Umbilical cord: Cord vessels: 3 vessel cord    Fetal Anatomy  ===========    4-chamber view: visualized  Heart / Thorax  Cardiac rhythm: regular (normal)  Stomach: normal  Kidneys: normal  Bladder: normal  Wants to know fetal sex: yes    Amniotic Fluid Assessment  =====================    Amount of AF: normal  MVP 4.3 cm. HEIDI 14.1 cm. Q1 3.3 cm, Q2 4.3 cm, Q3 3.5 cm, Q4 2.9 cm    Biophysical Profile  ==============    2: Fetal breathing movements  2: Gross body movements  2: Fetal tone  2: Amniotic fluid volume  NST: reactive  10/10 Biophysical profile score    Non Stress Test  =============    NST interpretation: reactive. Test duration 20 min. Baseline  bpm. Baseline variability: moderate. Accelerations: present. Decelerations: absent. Uterine activity:  absent    Findings  =======    Viable Curry pregnancy at 33w 1d by clinical dates.  Amniotic fluid is normal.  Placenta is posterior, fundal.  Biophysical profile score is 10/10.  Cephalic presentation.    Plan of Care  ==========    NP

## 2024-02-14 ENCOUNTER — OFFICE VISIT (OUTPATIENT)
Age: 29
End: 2024-02-14
Payer: COMMERCIAL

## 2024-02-14 VITALS
OXYGEN SATURATION: 97 % | TEMPERATURE: 98.1 F | DIASTOLIC BLOOD PRESSURE: 82 MMHG | WEIGHT: 212 LBS | BODY MASS INDEX: 36.97 KG/M2 | HEART RATE: 111 BPM | RESPIRATION RATE: 16 BRPM | SYSTOLIC BLOOD PRESSURE: 127 MMHG

## 2024-02-14 DIAGNOSIS — M45.6 ANKYLOSING SPONDYLITIS LUMBAR REGION (HCC): Primary | ICD-10-CM

## 2024-02-14 PROCEDURE — 99214 OFFICE O/P EST MOD 30 MIN: CPT | Performed by: PEDIATRICS

## 2024-02-14 RX ORDER — ETANERCEPT 50 MG/ML
50 SOLUTION SUBCUTANEOUS WEEKLY
Qty: 4 EACH | Refills: 3 | Status: ACTIVE | OUTPATIENT
Start: 2024-02-14

## 2024-02-14 NOTE — PROGRESS NOTES
Chief Complaint   Patient presents with    Joint Pain     1. Have you been to the ER, urgent care clinic since your last visit?  Hospitalized since your last visit?No    2. Have you seen or consulted any other health care providers outside of the [facility] since your last visit?  Include any pap smears or colon screening. No

## 2024-02-14 NOTE — PROGRESS NOTES
RHEUMATOLOGY PROBLEM LIST AND CHIEF COMPLAINT  1. Ankylosing Spondylitis - inflammatory back pain, spinous process edema T12-L1     Immunosuppression Screening (5/07/2021):  Quantiferon TB: negative  PPD:  Not performed  Hepatitis B: negative  Hepatitis C: negative    Therapy History includes:  Current NSAIDs include: ibuprofen 200 mg  Current DMARD therapy include:   Prior DMARD therapy include: Humira 40 mg every 14 days (7/11/2020 to 7/28/2020), Enbrel 50 mg every Saturday (8/01/2020 to 8/26/2020), Enbrel Mini 50 mg every Saturday (9/05/2020 to 12/2022), Plaquenil (7/2022-7/2023)  Discontinued DMARDs because of inefficacy: None  Discontinued DMARDs because of side effects: Humira (nausea, vomiting, headache, injection site reaction), Enbrel SureClick (injection site reaction)    INTERVAL HISTORY  This is a 28 y.o.  female.  Today, the patient complains of pain in the joints.  Location: back, hands  Severity: 3 on a scale of 0-10  Timing: all day     Duration: 4 months     Modifying factors:   Context/Associated signs and symptoms: At her last appointment we started her on Cimzia since she was pregnant, but she was unable to start this due to latex allergy. Her chief complaint today is numbness in her bilateral 1st-3rd digits. She has been wearing wrist splints and doing exercises for carpal tunnel. She also notes puffiness in her fingers worse in the mornings and unable to make a fist. She is currently 34 weeks pregnant.     RHEUMATOLOGY REVIEW OF SYSTEMS   Positives as per history  Negatives as follows:  CONSTITUTlONAL:  Denies unexplained persistent fevers, weight change, chronic fatigue  HEAD/EYES:   Denies eye redness, blurry vision or sudden loss of vision, dry eyes, HA, temporal artery pain  ENT:    Denies oral/nasal ulcers, recurrent sinus infections, dry mouth  RESPIRATORY:  No pleuritic pain, history of pleural effusions, hemoptysis, exertional dyspnea  CARDIOVASCULAR: Denies chest pain, history

## 2024-02-20 ENCOUNTER — ROUTINE PRENATAL (OUTPATIENT)
Age: 29
End: 2024-02-20
Payer: COMMERCIAL

## 2024-02-20 VITALS — SYSTOLIC BLOOD PRESSURE: 108 MMHG | HEART RATE: 99 BPM | DIASTOLIC BLOOD PRESSURE: 73 MMHG

## 2024-02-20 DIAGNOSIS — E66.9 OBESITY, CLASS II, BMI 35-39.9: ICD-10-CM

## 2024-02-20 DIAGNOSIS — Z3A.34 34 WEEKS GESTATION OF PREGNANCY: ICD-10-CM

## 2024-02-20 DIAGNOSIS — O12.13 GESTATIONAL PROTEINURIA, THIRD TRIMESTER: ICD-10-CM

## 2024-02-20 DIAGNOSIS — O24.414 INSULIN CONTROLLED GESTATIONAL DIABETES MELLITUS (GDM) DURING PREGNANCY, ANTEPARTUM: Primary | ICD-10-CM

## 2024-02-20 DIAGNOSIS — O99.213 MATERNAL OBESITY SYNDROME, THIRD TRIMESTER: ICD-10-CM

## 2024-02-20 PROCEDURE — 76818 FETAL BIOPHYS PROFILE W/NST: CPT | Performed by: OBSTETRICS & GYNECOLOGY

## 2024-02-20 PROCEDURE — 99213 OFFICE O/P EST LOW 20 MIN: CPT

## 2024-02-20 NOTE — PROGRESS NOTES
ASSESSMENT/PLAN:  1. Insulin controlled gestational diabetes mellitus (GDM) during pregnancy, antepartum  2. Obesity, Class II, BMI 35-39.9     1. A2GDM v. T2DM/Obesity  -Glucose log reviewed: blood sugars well controlled on 20 units of NPH at bedtime, 8 units humalog before breakfast  -TSH, urinalysis with reflex, and urine for microalbuminuria: WNL   -23 urine protein to creatine ratio 0.3mg/dl  - 24 hour urine pro/Cr  24--355 mg   -BP today 108/73  -Continue to work on dietary plan per clinical guidelines  -Participate in a reasonable exercise program, such as walking a mile a day.  -Continue FSBG X4 daily (FBS, 2 hr pc after each main meal): goals are for FBS to be 85-90 mg% and 2 hr pc's <120 mg%.  -Submit Glucose logs for review weekly   -BPP/NST reviewed and are reassuring.  -s/p fetal echo  see report in EPIC, WNL.   -Kick count, PIH, Labor precautions reviewed  - Scheduled for IOL on 3/25/24.     2. Maternal Ankylosing Spondylitis  -Patient is undergoing physical therapy. She has not had anesthesia consultation.  -S/P Rheumatology consult. Per pt, to start Enbrel after delivery.       Recommendations:  F/U 1 Week BPP/NST  Repeat UA and urine culture for proteinuria      Please see Viewpoint for ultrasound findings.        Subjective   Marycruz Tamez (:  1995) is a 28 y.o. female,Established patient, here for evaluation of the following chief complaint(s):  1. Insulin controlled gestational diabetes mellitus (GDM) during pregnancy, antepartum  2. Obesity, Class II, BMI 35-39.9    Current symptoms/problems include none.    Medication compliance:  compliant all of the time  Diabetic diet compliance:  compliant most of the time,   Current exercise: Walks 30 mins every other day  Barriers: time constraints  Home blood sugar records: reviewed and scanned into chart  Any episodes of hypoglycemia? no   reports that she has never smoked. She has never been exposed to tobacco smoke. She has never

## 2024-02-20 NOTE — PROCEDURES
PATIENT: ANÍBAL CRAWFORD   -  : 1995   -  DOS:2024   -  INTERPRETING PROVIDER:Cedric Navarro,   Indication  ========    Gestational diabetes, A2 v. B/T2 DM, Obesity, Maternal Ankylosing Spondylitis, History of maternal tachycardia. Gestational proteinuria    Method  ======    Transabdominal ultrasound examination. View: Sufficient    Pregnancy  =========    Crury pregnancy. Number of fetuses: 1    Dating  ======    LMP on: 2023  GA by LMP 34 w + 2 d  JARRETT by LMP: 3/31/2024  Previous Ultrasound on: 2023  Type of prior assessment: GA  GA at prior assessment date 8 w + 5 d  GA by previous U/S 34 w + 3 d  JARRETT by previous Ultrasound: 3/30/2024  Assigned: based on the LMP, selected on 2023  Assigned GA 34 w + 2 d  Assigned JARRETT: 3/31/2024    General Evaluation  ==============    Cardiac activity present.  bpm. Fetal movements: visualized. Presentation: Cephalic  Placenta: Placental site: posterior, fundal  Umbilical cord: Cord vessels: 3 vessel cord    Fetal Anatomy  ===========    Stomach: normal  Kidneys: normal  Bladder: normal  Wants to know fetal sex: yes    Amniotic Fluid Assessment  =====================    Amount of AF: normal  MVP 7.3 cm. HEIDI 17.3 cm. Q1 7.3 cm, Q2 4.1 cm, Q3 2.5 cm, Q4 3.4 cm    Biophysical Profile  ==============    2: Fetal breathing movements  2: Gross body movements  2: Fetal tone  2: Amniotic fluid volume  NST: reactive  10/10 Biophysical profile score  Interpretation: normal    Non Stress Test  =============    NST interpretation: reactive. Test duration 20 min. Baseline  bpm. Baseline variability: moderate. Accelerations: present. Decelerations: absent. Uterine activity:  absent. Acoustic stimulation: no    Findings  =======    Viable Curry pregnancy at 34w 2d by clinical dates.  Amniotic fluid is normal.  Placenta is posterior, fundal.  Cephalic presentation.  Biophysical profile score is 10/10.  NST is reactive.    Curry living

## 2024-02-26 ENCOUNTER — ROUTINE PRENATAL (OUTPATIENT)
Age: 29
End: 2024-02-26

## 2024-02-26 ENCOUNTER — ROUTINE PRENATAL (OUTPATIENT)
Age: 29
End: 2024-02-26
Payer: COMMERCIAL

## 2024-02-26 VITALS
HEART RATE: 112 BPM | WEIGHT: 215.6 LBS | DIASTOLIC BLOOD PRESSURE: 80 MMHG | BODY MASS INDEX: 37.59 KG/M2 | SYSTOLIC BLOOD PRESSURE: 112 MMHG

## 2024-02-26 VITALS — HEART RATE: 86 BPM | SYSTOLIC BLOOD PRESSURE: 122 MMHG | DIASTOLIC BLOOD PRESSURE: 78 MMHG

## 2024-02-26 DIAGNOSIS — M45.9 ANKYLOSING SPONDYLITIS, UNSPECIFIED SITE OF SPINE (HCC): ICD-10-CM

## 2024-02-26 DIAGNOSIS — E66.9 OBESITY, CLASS II, BMI 35-39.9: ICD-10-CM

## 2024-02-26 DIAGNOSIS — M45.6 ANKYLOSING SPONDYLITIS OF LUMBAR REGION (HCC): ICD-10-CM

## 2024-02-26 DIAGNOSIS — Z3A.35 35 WEEKS GESTATION OF PREGNANCY: Primary | ICD-10-CM

## 2024-02-26 DIAGNOSIS — O24.414 WHITE CLASSIFICATION A2 GESTATIONAL DIABETES MELLITUS (GDM), INSULIN CONTROLLED: ICD-10-CM

## 2024-02-26 DIAGNOSIS — Z34.80 PRENATAL CARE OF MULTIGRAVIDA, ANTEPARTUM: Primary | ICD-10-CM

## 2024-02-26 DIAGNOSIS — Z3A.35 35 WEEKS GESTATION OF PREGNANCY: ICD-10-CM

## 2024-02-26 DIAGNOSIS — O12.13 PROTEINURIA AFFECTING PREGNANCY IN THIRD TRIMESTER: ICD-10-CM

## 2024-02-26 DIAGNOSIS — Z87.898 HISTORY OF TACHYCARDIA: ICD-10-CM

## 2024-02-26 PROCEDURE — 76818 FETAL BIOPHYS PROFILE W/NST: CPT | Performed by: OBSTETRICS & GYNECOLOGY

## 2024-02-26 PROCEDURE — 0502F SUBSEQUENT PRENATAL CARE: CPT | Performed by: OBSTETRICS & GYNECOLOGY

## 2024-02-26 NOTE — PROGRESS NOTES
[facility]  [email or web address]  [phone]  Jeannette Cheung MD, FACOG      Routine follow-up OB visit     Chief Complaint   Patient presents with    Routine Prenatal Visit       Patient Active Problem List    Diagnosis Date Noted    Obesity affecting pregnancy in second trimester 2023    White classification A2 gestational diabetes mellitus (GDM), insulin controlled 2023    Obesity, Class II, BMI 35-39.9 2023    Prenatal care of multigravida, antepartum 2023    Scoliosis     Asthma     Tachycardia 2021    Class 1 obesity due to excess calories without serious comorbidity with body mass index (BMI) of 32.0 to 32.9 in adult 2021    Long-term use of immunosuppressant medication 2020    Ankylosing spondylitis of lumbar region (HCC) 2020    Ankylosing spondylitis (HCC) 2020       SUBJECTIVE:  Pt reports increase wave.  BS stable.   Occ sharp shooting pains on right side    Co peeling and itching on left nipple: cortisone helps     OBJECTIVE:  Vitals:    24 1254   BP: 112/80   Pulse: (!) 112     See prenatal flowsheet    Physical Exam:  Gen: no acute distress, normal speech  Cardiac: appears warm and well perfused  Pulm: breathing comfortably on room air  Abd: soft, gravid, non-tender  Fundal height: see prenatal flowsheet  Ext: symmetric; moves all 4 extremities equally  Nipple left: superficial peeling/flaking no erythema  Right: wnl      Assessment:  28 y.o.  35w2d   Routine prenatal visit  Encounter Diagnoses   Name Primary?    35 weeks gestation of pregnancy     Prenatal care of multigravida, antepartum Yes    White classification A2 gestational diabetes mellitus (GDM), insulin controlled        Plan:   Routine follow up OB appointment  Continue routine prenatal care   Routine OB instructions given appropriate to gestational age, see AVS  Disc topical txt to nipple: rec adding nipple ointment and dont use cortisone for

## 2024-02-26 NOTE — PROCEDURES
PATIENT: ANÍBAL CRAWFORD   -  : 1995   -  DOS:2024   -  INTERPRETING PROVIDER:Johanny Vidal,   Indication  ========    Gestational diabetes, A2 v. B/T2 DM, Obesity, Maternal Ankylosing Spondylitis, History of maternal tachycardia. Gestational proteinuria    Method  ======    Transabdominal ultrasound examination. View: Sufficient    Pregnancy  =========    Curry pregnancy. Number of fetuses: 1    Dating  ======    LMP on: 2023  GA by LMP 35 w + 1 d  JARRETT by LMP: 3/31/2024  Previous Ultrasound on: 2023  Type of prior assessment: GA  GA at prior assessment date 8 w + 5 d  GA by previous U/S 35 w + 2 d  JARRETT by previous Ultrasound: 3/30/2024  Assigned: based on the LMP, selected on 2023  Assigned GA 35 w + 1 d  Assigned JARRETT: 3/31/2024    General Evaluation  ==============    Cardiac activity present.  bpm. Fetal movements: visualized. Presentation: Cephalic  Placenta: Placental site: posterior, fundal  Umbilical cord: Cord vessels: 3 vessel cord    Fetal Anatomy  ===========    Stomach: normal  Kidneys: normal  Bladder: normal  Wants to know fetal sex: yes    Amniotic Fluid Assessment  =====================    Amount of AF: normal  MVP 4.5 cm. HEIDI 16.8 cm. Q1 4.5 cm, Q2 4.1 cm, Q3 3.8 cm, Q4 4.4 cm    Biophysical Profile  ==============    2: Fetal breathing movements  2: Gross body movements  2: Fetal tone  2: Amniotic fluid volume  NST: reactive  10/10 Biophysical profile score    Non Stress Test  =============    NST interpretation: reactive. Test duration 20 min. Baseline  bpm. Baseline variability: moderate. Accelerations: present. Decelerations: absent. Uterine activity:  absent    Findings  =======    Viable Curry pregnancy at 35w 1d by clinical dates.  Amniotic fluid is normal.  Placenta is posterior, fundal.  Cephalic presentation.  Biophysical profile score is 10/10.  NST is reactive.    Plan of Care  ==========    28-year-old  pregnancy complicated

## 2024-03-02 NOTE — RESULT ENCOUNTER NOTE
Pt s/p MFM US and consultation.  MFM report reviewed by Jeannette Cheung MD.   Rec pp urology consult

## 2024-03-03 NOTE — PATIENT INSTRUCTIONS
Dr. Cheung's Patient Education Pages:    Preparing for Labor:    Preregistration:  [email or web address]  Maternity Pre-registration   [email or web address]      Contact the office/on-call service at [phone] if:   You have regular, uncomfortable contractions that are getting stronger and occur at least every five minutes, last at least one minute, and occur consistently for at least one hour (the 5-1-1 Rule).    Your water has broken (a big gush of fluid and/or persistent leakage of fluid, with or without regular contractions).  You are bleeding heavily from the vagina (but spotting after a cervical exam is not unusual).  You have constant, severe pain with no relief between contractions.  You notice your baby is moving less often or you have less than 10 movements or kicks over two hours.  You are not sure if you are in labor or you have another urgent concern.     Urgent after-hours calls: if we don't call you back within 30 minutes, please call again.  You can send non-urgent questions through [employer] at any time.    For an emergency, call 9-1-1 or go to the hospital immediately.  Labor & Delivery is on the 2nd floor of [facility], to the left of the waterfall wall.     Other changes you may experience that can be observed until labor starts:  Suleiman Paez contractions (or “practice” contractions) can be painful and can make you think you are in labor when you are not.  These are less regular than labor contractions and will usually improve with rest, a warm shower, and/or increased hydration.  You may feel the baby drop into the pelvis or “lightening.”   You may experience the loss of your mucus plug or an increase/change in vagina discharge.      To prepare for labor:  Pack a bag for the hospital and leave it near the door or in your car.  Make sure you have a properly installed car seat to bring home your baby.  Have a plan for who will care for your

## 2024-03-04 ENCOUNTER — ROUTINE PRENATAL (OUTPATIENT)
Age: 29
End: 2024-03-04

## 2024-03-04 ENCOUNTER — ROUTINE PRENATAL (OUTPATIENT)
Age: 29
End: 2024-03-04
Payer: COMMERCIAL

## 2024-03-04 VITALS
SYSTOLIC BLOOD PRESSURE: 116 MMHG | BODY MASS INDEX: 37.49 KG/M2 | WEIGHT: 215 LBS | DIASTOLIC BLOOD PRESSURE: 83 MMHG | HEART RATE: 98 BPM

## 2024-03-04 VITALS — HEART RATE: 106 BPM | DIASTOLIC BLOOD PRESSURE: 79 MMHG | SYSTOLIC BLOOD PRESSURE: 114 MMHG

## 2024-03-04 DIAGNOSIS — M45.9 ANKYLOSING SPONDYLITIS, UNSPECIFIED SITE OF SPINE (HCC): Primary | ICD-10-CM

## 2024-03-04 DIAGNOSIS — M45.9 ANKYLOSING SPONDYLITIS, UNSPECIFIED SITE OF SPINE (HCC): ICD-10-CM

## 2024-03-04 DIAGNOSIS — E66.9 OBESITY, CLASS II, BMI 35-39.9: ICD-10-CM

## 2024-03-04 DIAGNOSIS — Z87.898 HISTORY OF TACHYCARDIA: ICD-10-CM

## 2024-03-04 DIAGNOSIS — Z3A.36 36 WEEKS GESTATION OF PREGNANCY: ICD-10-CM

## 2024-03-04 DIAGNOSIS — Z34.80 PRENATAL CARE OF MULTIGRAVIDA, ANTEPARTUM: Primary | ICD-10-CM

## 2024-03-04 DIAGNOSIS — O24.414 INSULIN CONTROLLED GESTATIONAL DIABETES MELLITUS (GDM) DURING PREGNANCY, ANTEPARTUM: Primary | ICD-10-CM

## 2024-03-04 LAB — GBS, EXTERNAL RESULT: POSITIVE

## 2024-03-04 PROCEDURE — 0502F SUBSEQUENT PRENATAL CARE: CPT | Performed by: OBSTETRICS & GYNECOLOGY

## 2024-03-04 PROCEDURE — 76818 FETAL BIOPHYS PROFILE W/NST: CPT | Performed by: OBSTETRICS & GYNECOLOGY

## 2024-03-04 PROCEDURE — 76816 OB US FOLLOW-UP PER FETUS: CPT | Performed by: OBSTETRICS & GYNECOLOGY

## 2024-03-04 PROCEDURE — 99214 OFFICE O/P EST MOD 30 MIN: CPT

## 2024-03-04 NOTE — PROGRESS NOTES
[facility]  [email or web address]  [phone]  Jeannette Cheung MD, FACOG      Routine follow-up OB visit     Chief Complaint   Patient presents with    Routine Prenatal Visit       Patient Active Problem List    Diagnosis Date Noted    Prenatal care of multigravida, antepartum 2023    History of tachycardia 2024    Proteinuria affecting pregnancy in third trimester 2024    Obesity affecting pregnancy in second trimester 2023    White classification A2 gestational diabetes mellitus (GDM), insulin controlled 2023    Obesity, Class II, BMI 35-39.9 2023    Scoliosis     Asthma     Tachycardia 2021    Class 1 obesity due to excess calories without serious comorbidity with body mass index (BMI) of 32.0 to 32.9 in adult 2021    Long-term use of immunosuppressant medication 2020    Ankylosing spondylitis of lumbar region (HCC) 2020    Ankylosing spondylitis (HCC) 2020       SUBJECTIVE:  Pt reports doing well.      OBJECTIVE:  Vitals:    24 0848   BP: 116/83   Pulse: 98     See prenatal flowsheet    Physical Exam:  Gen: no acute distress, normal speech  Cardiac: appears warm and well perfused  Pulm: breathing comfortably on room air  Abd: soft, gravid, non-tender  Fundal height: see prenatal flowsheet  Ext: symmetric; moves all 4 extremities equally    Assessment:  28 y.o.  36w2d   Routine prenatal visit  Encounter Diagnoses   Name Primary?    Prenatal care of multigravida, antepartum Yes    36 weeks gestation of pregnancy        Plan:   Routine follow up OB appointment  Continue routine prenatal care   Routine OB instructions given appropriate to gestational age, see AVS  Gbs done    Orders Placed This Encounter   Procedures    Group B Strep, LIZ + Reflex     Standing Status:   Future     Standing Expiration Date:   3/3/2025        No follow-ups on file.      On this date of service, I have spent greater than 10

## 2024-03-04 NOTE — PROCEDURES
PATIENT: ANÍBAL CRAWFORD   -  : 1995   -  DOS:2024   -  INTERPRETING PROVIDER:Elroy Shepherd,   Indication  ========    Gestational diabetes, A2 v. B/T2 DM, Obesity, Maternal Ankylosing Spondylitis, History of maternal tachycardia. Gestational proteinuria    Method  ======    Transabdominal ultrasound examination. View: Sufficient    Pregnancy  =========    Curry pregnancy. Number of fetuses: 1    Dating  ======    LMP on: 2023  GA by LMP 36 w + 1 d  JARRETT by LMP: 3/31/2024  Previous Ultrasound on: 2023  Type of prior assessment: GA  GA at prior assessment date 8 w + 5 d  GA by previous U/S 36 w + 2 d  JARRETT by previous Ultrasound: 3/30/2024  Ultrasound examination on: 3/4/2024  GA by U/S based upon: AC, BPD, Femur, HC  GA by U/S 35 w + 0 d  JARRETT by U/S: 2024  Assigned: based on the LMP, selected on 2023  Assigned GA 36 w + 1 d  Assigned JARRETT: 3/31/2024    Fetal Biometry  ============    Standard  BPD 87.8 mm 35w 3d 40% Hadlock  .1 mm 38w 4d 81% Jonna  .9 mm 35w 6d 16% Hadlock  Cerebellum tr 46.0 mm 35w 0d 24% Hill  .2 mm 35w 0d 27% Hadlock  Femur 65.3 mm 33w 5d 3% Hadlock  EFW 2,509 g 34w 4d 19% Hadlock  EFW (lb) 5 lb  EFW (oz) 8 oz  EFW by: Hadlock (BPD-HC-AC-FL)  Extended   6.2 mm  Other Structures   bpm    General Evaluation  ==============    Cardiac activity present.  bpm. Fetal movements: visualized. Presentation: Cephalic  Placenta: Placental site: posterior, fundal  Umbilical cord: Cord vessels: 3 vessel cord  Amniotic fluid: Amount of AF: normal. MVP 5.0 cm. HEIDI 14.4 cm. Q1 3.0 cm, Q2 5.0 cm, Q3 2.1 cm, Q4 4.3 cm    Fetal Anatomy  ===========    Stomach: normal  Kidneys: normal  Bladder: normal  Wants to know fetal sex: yes    Biophysical Profile  ==============    2: Fetal breathing movements  2: Gross body movements  2: Fetal tone  2: Amniotic fluid volume  NST: reactive  10/10 Biophysical profile score    Non Stress

## 2024-03-04 NOTE — PROGRESS NOTES
ASSESSMENT/PLAN:  1. Insulin controlled gestational diabetes mellitus (GDM) during pregnancy, antepartum  2. History of tachycardia  3. Ankylosing spondylitis, unspecified site of spine (HCC)  4. Obesity, Class II, BMI 35-39.9    28-year-old  pregnancy complicated by:     1) A2DM/Obesity Class II   -1hr gtt failed 23, 3hr gtt failed 23  -Glucose log reviewed: good glycemic control on NPH 0/20 and lispro 8/0/0  -Kick count instructions and PIH precautions reviewed      2) Hx maternal tachycardia, uncertain etiology, FH POTS  -Tachycardia preceded the onset of this pregnancy, Today pulse 106, /79  -23 Cardiology ECHO (SHO) =Left Ventricle: Normal left ventricular systolic function with a visually estimated EF of 55 - 60%. Left ventricle size is normal. Normal wall thickness. Normal wall motion. Pulmonic Valve: Mild regurgitation.  -23 EKG=Sinus Rhythm      3) Ankylosing spondylitis  -Currently off all medications  -Sees rheumatologist  -Met DC previously to review risks, diagnostic options  - LR NIPT, MS AFP.  - Concerns about epidural candidacy. Anesthesia provided pt information today and to contact pt with consult.      4) proteinuria  -BP WNL  -24 24hr Upro = 355mg  -referral to urology postpartum      5) Hx mild left-sided fetal pyelectasis   - LR NIPT, MS AFP     Recommendations:  -Continue weekly  testing   -For women with GDM that is well controlled by medications, delivery is recommended between 39 0/7 weeks and 39 6/7 weeks gestation or sooner if clinically indicated.      Please see Viewpoint for ultrasound findings.     Angela Tamez (:  1995) is a 28 y.o. female,Established patient, here for evaluation of the following chief complaint(s):  1. Insulin controlled gestational diabetes mellitus (GDM) during pregnancy, antepartum  2. History of tachycardia  3. Ankylosing spondylitis, unspecified site of spine (HCC)  4. Obesity, Class II,

## 2024-03-10 LAB
CLINDAMYCIN ISLT KB: ABNORMAL
GP B STREP DNA SPEC QL NAA+PROBE: POSITIVE
ORGANISM: ABNORMAL
SPECIMEN SOURCE: ABNORMAL

## 2024-03-11 ENCOUNTER — TELEPHONE (OUTPATIENT)
Age: 29
End: 2024-03-11

## 2024-03-11 NOTE — TELEPHONE ENCOUNTER
Received a message from our nurse that patient had questions regarding growth scan. Dr. Vidal called patient to discuss. Patient denied any concerns. Patient reassured of scan. Patient has an appointment with [facility] tomorrow. Dr. Vidal instructed patient that she will have another growth completed next week.

## 2024-03-12 ENCOUNTER — ROUTINE PRENATAL (OUTPATIENT)
Age: 29
End: 2024-03-12
Payer: COMMERCIAL

## 2024-03-12 VITALS — SYSTOLIC BLOOD PRESSURE: 122 MMHG | HEART RATE: 89 BPM | DIASTOLIC BLOOD PRESSURE: 81 MMHG

## 2024-03-12 DIAGNOSIS — Z87.898 HISTORY OF TACHYCARDIA: ICD-10-CM

## 2024-03-12 DIAGNOSIS — E66.9 OBESITY, CLASS II, BMI 35-39.9: ICD-10-CM

## 2024-03-12 DIAGNOSIS — O12.13 PROTEINURIA AFFECTING PREGNANCY IN THIRD TRIMESTER: ICD-10-CM

## 2024-03-12 DIAGNOSIS — O24.414 INSULIN CONTROLLED GESTATIONAL DIABETES MELLITUS (GDM) DURING PREGNANCY, ANTEPARTUM: Primary | ICD-10-CM

## 2024-03-12 DIAGNOSIS — O24.414 WHITE CLASSIFICATION A2 GESTATIONAL DIABETES MELLITUS (GDM), INSULIN CONTROLLED: ICD-10-CM

## 2024-03-12 DIAGNOSIS — M45.6 ANKYLOSING SPONDYLITIS OF LUMBAR REGION (HCC): ICD-10-CM

## 2024-03-12 DIAGNOSIS — J45.20 MILD INTERMITTENT ASTHMA WITHOUT COMPLICATION: ICD-10-CM

## 2024-03-12 PROCEDURE — 76818 FETAL BIOPHYS PROFILE W/NST: CPT | Performed by: OBSTETRICS & GYNECOLOGY

## 2024-03-12 PROCEDURE — 99212 OFFICE O/P EST SF 10 MIN: CPT | Performed by: OBSTETRICS & GYNECOLOGY

## 2024-03-12 NOTE — PROCEDURES
PATIENT: ANÍBAL CRAWFORD   -  : 1995   -  DOS:2024   -  INTERPRETING PROVIDER:Johanny Vidal,   Indication  ========    Gestational diabetes, A2 v. B/T2 DM, Obesity, Maternal Ankylosing Spondylitis, History of maternal tachycardia. Gestational proteinuria    Method  ======    Transabdominal ultrasound examination. View: Sufficient    Pregnancy  =========    Curry pregnancy. Number of fetuses: 1    Dating  ======    LMP on: 2023  GA by LMP 37 w + 2 d  JARRETT by LMP: 3/31/2024  Previous Ultrasound on: 2023  Type of prior assessment: GA  GA at prior assessment date 8 w + 5 d  GA by previous U/S 37 w + 3 d  JARRETT by previous Ultrasound: 3/30/2024  Assigned: based on the LMP, selected on 2023  Assigned GA 37 w + 2 d  Assigned JARRETT: 3/31/2024    General Evaluation  ==============    Cardiac activity present.  bpm. Fetal movements: visualized. Presentation: Cephalic  Placenta: Placental site: posterior, fundal  Umbilical cord: Cord vessels: 3 vessel cord    Fetal Biometry  ============    Standard  Humerus 59.9 mm 34w 5d 16% Jonna  EFW by: Hadlock (BPD-HC-AC-FL)  Other Structures   bpm    Fetal Anatomy  ===========    Stomach: normal  Kidneys: normal  Bladder: normal  Wants to know fetal sex: yes    Amniotic Fluid Assessment  =====================    Amount of AF: normal  MVP 5.7 cm. HEIDI 14.8 cm. Q1 5.7 cm, Q2 0.0 cm, Q3 3.8 cm, Q4 5.3 cm    Biophysical Profile  ==============    2: Fetal breathing movements  2: Gross body movements  2: Fetal tone  2: Amniotic fluid volume  NST: reactive  10/10 Biophysical profile score    Non Stress Test  =============    NST interpretation: reactive. Test duration 20 min. Baseline  bpm. Baseline variability: moderate. Accelerations: present. Decelerations: absent. Uterine activity:  absent    Findings  =======    Intrauterine Curry pregnancy at 37w 2d by clinical dates.  Amniotic fluid is normal.  Placenta is posterior,

## 2024-03-14 ENCOUNTER — ROUTINE PRENATAL (OUTPATIENT)
Age: 29
End: 2024-03-14

## 2024-03-14 VITALS — DIASTOLIC BLOOD PRESSURE: 85 MMHG | BODY MASS INDEX: 37.66 KG/M2 | SYSTOLIC BLOOD PRESSURE: 125 MMHG | WEIGHT: 216 LBS

## 2024-03-14 DIAGNOSIS — Z3A.37 37 WEEKS GESTATION OF PREGNANCY: ICD-10-CM

## 2024-03-14 DIAGNOSIS — Z34.80 PRENATAL CARE OF MULTIGRAVIDA, ANTEPARTUM: Primary | ICD-10-CM

## 2024-03-14 PROCEDURE — 0502F SUBSEQUENT PRENATAL CARE: CPT | Performed by: OBSTETRICS & GYNECOLOGY

## 2024-03-14 NOTE — PROGRESS NOTES
[facility]  [email or web address]  [phone]  Jeannette Cheung MD, FACOG      Routine follow-up OB visit     Chief Complaint   Patient presents with    Routine Prenatal Visit       Patient Active Problem List    Diagnosis Date Noted    Prenatal care of multigravida, antepartum 2023    History of tachycardia 2024    Proteinuria affecting pregnancy in third trimester 2024    Obesity affecting pregnancy in second trimester 2023    White classification A2 gestational diabetes mellitus (GDM), insulin controlled 2023    Obesity, Class II, BMI 35-39.9 2023    Scoliosis     Asthma     Tachycardia 2021    Class 1 obesity due to excess calories without serious comorbidity with body mass index (BMI) of 32.0 to 32.9 in adult 2021    Long-term use of immunosuppressant medication 2020    Ankylosing spondylitis of lumbar region (HCC) 2020    Ankylosing spondylitis (HCC) 2020       SUBJECTIVE:  Pt reports doing well     OBJECTIVE:  Vitals:    24 0854   BP: 125/85     See prenatal flowsheet    Physical Exam:  Gen: no acute distress, normal speech  Cardiac: appears warm and well perfused  Pulm: breathing comfortably on room air  Abd: soft, gravid, non-tender  Fundal height: see prenatal flowsheet  Ext: symmetric; moves all 4 extremities equally    Assessment:  28 y.o.  37w5d   Routine prenatal visit  Encounter Diagnoses   Name Primary?    Prenatal care of multigravida, antepartum Yes    37 weeks gestation of pregnancy        Plan:   Routine follow up OB appointment  Continue routine prenatal care   Routine OB instructions given appropriate to gestational age, see AVS  Disc pp urology fu and MFM labs  Labor/rom precautions    Orders Placed This Encounter   Procedures    Culture, Urine     Standing Status:   Future     Number of Occurrences:   1     Standing Expiration Date:   3/12/2025    GBS, External Result     This external

## 2024-03-15 LAB
APPEARANCE UR: ABNORMAL
BACTERIA URNS QL MICRO: ABNORMAL /HPF
BILIRUB UR QL: NEGATIVE
COLOR UR: ABNORMAL
COMMENT:: NORMAL
EPITH CASTS URNS QL MICRO: ABNORMAL /LPF
GLUCOSE UR STRIP.AUTO-MCNC: NEGATIVE MG/DL
HGB UR QL STRIP: NEGATIVE
HYALINE CASTS URNS QL MICRO: ABNORMAL /LPF (ref 0–5)
KETONES UR QL STRIP.AUTO: NEGATIVE MG/DL
LEUKOCYTE ESTERASE UR QL STRIP.AUTO: NEGATIVE
NITRITE UR QL STRIP.AUTO: NEGATIVE
PH UR STRIP: 7 (ref 5–8)
PROT UR STRIP-MCNC: 30 MG/DL
RBC #/AREA URNS HPF: ABNORMAL /HPF (ref 0–5)
SP GR UR REFRACTOMETRY: 1.02 (ref 1–1.03)
SPECIMEN HOLD: NORMAL
UROBILINOGEN UR QL STRIP.AUTO: 0.2 EU/DL (ref 0.2–1)
WBC URNS QL MICRO: ABNORMAL /HPF (ref 0–4)

## 2024-03-17 LAB
BACTERIA SPEC CULT: ABNORMAL
BACTERIA SPEC CULT: ABNORMAL
CC UR VC: ABNORMAL
SERVICE CMNT-IMP: ABNORMAL

## 2024-03-18 ENCOUNTER — ROUTINE PRENATAL (OUTPATIENT)
Age: 29
End: 2024-03-18
Payer: COMMERCIAL

## 2024-03-18 VITALS — SYSTOLIC BLOOD PRESSURE: 129 MMHG | DIASTOLIC BLOOD PRESSURE: 82 MMHG | HEART RATE: 108 BPM

## 2024-03-18 VITALS — DIASTOLIC BLOOD PRESSURE: 87 MMHG | SYSTOLIC BLOOD PRESSURE: 127 MMHG

## 2024-03-18 DIAGNOSIS — Z11.3 VENEREAL DISEASE SCREENING: ICD-10-CM

## 2024-03-18 DIAGNOSIS — Z34.80 PRENATAL CARE OF MULTIGRAVIDA, ANTEPARTUM: ICD-10-CM

## 2024-03-18 DIAGNOSIS — O24.414 INSULIN CONTROLLED GESTATIONAL DIABETES MELLITUS (GDM) DURING PREGNANCY, ANTEPARTUM: Primary | ICD-10-CM

## 2024-03-18 DIAGNOSIS — Z3A.38 38 WEEKS GESTATION OF PREGNANCY: Primary | ICD-10-CM

## 2024-03-18 DIAGNOSIS — O46.90 VAGINAL BLEEDING IN PREGNANCY: ICD-10-CM

## 2024-03-18 LAB — FERN TEST, POC: NORMAL

## 2024-03-18 PROCEDURE — 99212 OFFICE O/P EST SF 10 MIN: CPT | Performed by: OBSTETRICS & GYNECOLOGY

## 2024-03-18 PROCEDURE — 89060 EXAM SYNOVIAL FLUID CRYSTALS: CPT | Performed by: OBSTETRICS & GYNECOLOGY

## 2024-03-18 PROCEDURE — 76819 FETAL BIOPHYS PROFIL W/O NST: CPT | Performed by: OBSTETRICS & GYNECOLOGY

## 2024-03-18 PROCEDURE — 59025 FETAL NON-STRESS TEST: CPT | Performed by: OBSTETRICS & GYNECOLOGY

## 2024-03-18 PROCEDURE — 99213 OFFICE O/P EST LOW 20 MIN: CPT | Performed by: OBSTETRICS & GYNECOLOGY

## 2024-03-18 NOTE — PROGRESS NOTES
NST procedure note    Marycruz Tamez is a ,  28 y.o. female White (non-) whose LMP  was on  who presents for fetal non-stress test.    She is 38w2d and was monitored for 50 minutes and the FHR was reactive.  Acoustic stim used x 1    NST Interpretation:    FHR baseline 145 bpm, variability moderate, accelerations present, decelerations Absent.    Uterine contractions were occ.    Marycruz was informed of the NST results and her questions were answered.    Disposition:   To MFM  Rec to LD if oligo or s/sx of labor/PROM

## 2024-03-18 NOTE — PROGRESS NOTES
[facility]  [email or web address]  [phone]    Jeannette Cheung MD, FACOG    OB/GYN: OB Problem visit    Chief Complaint:   Chief Complaint   Patient presents with    Routine Prenatal Visit    Abdominal Cramping    Vaginal Bleeding         Pt co gush of fluid at 2 am and fluid and blood 430 am.  Light bleeding now.   No recent SA.   Cramping.  +FM, good  No persistent leaking      Per Rooming Note:  The patient is reporting having: called on call this am at about 4. She states when she woke up she had some bright red bleeding in her underwear and felt a gush of fluid. She denies leaking now but has complaints of abdominal cramping   This is  a new problem.  This is a routinely scheduled follow up OB visit.  She has not experienced this problem before.     She reports the symptoms are is unchanged.  Aggravating factors include none.  And alleviating factors include none.     She does not have other concerns.      PFSH:  Past Medical History:   Diagnosis Date    Adverse effect of anesthesia     Patient stated she woke up \"In terror\" post op    Ankylosing spondylitis (HCC) 06/2020    Asthma     Autoimmune disorder (HCC)     Ankylosing spondylitis    Broken foot 07/2019    right foot, no surgery-wore boot/cast for 6 weeks    Chronic pain     generalized, ankle to back    Drug effect     Gestational diabetes     Pregnancy with due date 03/30/24    Migraine     Migraines     Muscle spasm     Pap smear for cervical cancer screening 11/10/2021    negative    Premature birth     at 28 weeks    Routine Papanicolaou smear 9/27/19 neg    Scoliosis     Tachycardia 09/2021    TMJ (temporomandibular joint syndrome)      Past Surgical History:   Procedure Laterality Date    KNEE ARTHROSCOPY Left 06/2018     Family History   Problem Relation Age of Onset    Other Maternal Grandfather         CFH    Breast Cancer Paternal Grandmother     Breast Cancer Other     Thyroid Disease Sister     No Known

## 2024-03-18 NOTE — PROGRESS NOTES
Rooming note, OB problem visit    Chief Complaint   Patient presents with    Routine Prenatal Visit     Marycruz Tamez is a 28 y.o. female presents for a problem visit.    38w2d    The patient is reporting having: called on call this am at about 4. She states when she woke up she had some bright red bleeding in her underwear and felt a gush of fluid. She denies leaking now but has complaints of abdominal cramping   This is  a new problem.  This is a routinely scheduled follow up OB visit.  She has not experienced this problem before.    She reports the symptoms are is unchanged.  Aggravating factors include none.  And alleviating factors include none.    She does not have other concerns.      1. Have you been to the ER, urgent care clinic, or hospitalized since your last visit?No    2. Have you seen or consulted any other health care providers outside of the [facility] since your last visit? No

## 2024-03-18 NOTE — PROCEDURES
PATIENT: ANÍBAL CRAWFORD   -  : 1995   -  DOS:2024   -  INTERPRETING PROVIDER:Elroy Shepherd,   Indication  ========    Gestational diabetes, A2 v. B/T2 DM, Obesity, Maternal Ankylosing Spondylitis, History of maternal tachycardia. Gestational proteinuria    Method  ======    Transabdominal ultrasound examination. View: Sufficient    Pregnancy  =========    Curry pregnancy. Number of fetuses: 1    Dating  ======    LMP on: 2023  GA by LMP 38 w + 1 d  JARRETT by LMP: 3/31/2024  Previous Ultrasound on: 2023  Type of prior assessment: GA  GA at prior assessment date 8 w + 5 d  GA by previous U/S 38 w + 2 d  JARRETT by previous Ultrasound: 3/30/2024  Assigned: based on the LMP, selected on 2023  Assigned GA 38 w + 1 d  Assigned JARRETT: 3/31/2024    General Evaluation  ==============    Cardiac activity present.  bpm. Fetal movements: visualized. Presentation: Cephalic  Placenta: Placental site: posterior, fundal  Umbilical cord: Cord vessels: 3 vessel cord    Fetal Biometry  ============    Standard  BPD 93.1 mm 37w 6d 68% Hadlock  .3 mm -/- 92% Jonna  .1 mm 38w 1d 32% Hadlock  .9 mm 36w 4d 25% Hadlock  Femur 69.3 mm 35w 4d 5% Hadlock  Humerus 60.2 mm 35w 0d 10% Jonna  EFW 2,996 g 36w 6d 27% Hadlock  EFW (lb) 6 lb  EFW (oz) 10 oz  EFW by: Hadlock (BPD-HC-AC-FL)  Other Structures   bpm    Fetal Anatomy  ===========    Stomach: normal  Kidneys: normal  Bladder: normal  Wants to know fetal sex: yes    Amniotic Fluid Assessment  =====================    Amount of AF: normal  MVP 6.0 cm. HEIDI 18.2 cm. Q1 3.4 cm, Q2 5.0 cm, Q3 3.9 cm, Q4 6.0 cm    Biophysical Profile  ==============    2: Fetal breathing movements  2: Gross body movements  2: Fetal tone  2: Amniotic fluid volume   Biophysical profile score    Consultation  ==========    28-year-old  pregnancy complicated by:    1) A2DM/Obesity Class II  -1hr gtt failed 23, 3hr gtt failed

## 2024-03-19 ENCOUNTER — HOSPITAL ENCOUNTER (INPATIENT)
Facility: HOSPITAL | Age: 29
LOS: 2 days | Discharge: HOME | End: 2024-03-21
Attending: OBSTETRICS & GYNECOLOGY | Admitting: OBSTETRICS & GYNECOLOGY
Payer: COMMERCIAL

## 2024-03-19 PROBLEM — Z37.9 NORMAL LABOR: Status: ACTIVE | Noted: 2024-03-19

## 2024-03-19 PROBLEM — E66.811 CLASS 1 OBESITY DUE TO EXCESS CALORIES WITHOUT SERIOUS COMORBIDITY WITH BODY MASS INDEX (BMI) OF 32.0 TO 32.9 IN ADULT: Status: RESOLVED | Noted: 2021-09-01 | Resolved: 2024-03-19

## 2024-03-19 PROBLEM — E66.09 CLASS 1 OBESITY DUE TO EXCESS CALORIES WITHOUT SERIOUS COMORBIDITY WITH BODY MASS INDEX (BMI) OF 32.0 TO 32.9 IN ADULT: Status: RESOLVED | Noted: 2021-09-01 | Resolved: 2024-03-19

## 2024-03-19 LAB
ALBUMIN SERPL-MCNC: 2.9 G/DL (ref 3.5–5)
ALBUMIN/GLOB SERPL: 0.9 (ref 1.1–2.2)
ALP SERPL-CCNC: 207 U/L (ref 45–117)
ALT SERPL-CCNC: 15 U/L (ref 12–78)
ANION GAP SERPL CALC-SCNC: 9 MMOL/L (ref 5–15)
AST SERPL-CCNC: 16 U/L (ref 15–37)
BILIRUB SERPL-MCNC: 0.5 MG/DL (ref 0.2–1)
BUN SERPL-MCNC: 16 MG/DL (ref 6–20)
BUN/CREAT SERPL: 19 (ref 12–20)
CALCIUM SERPL-MCNC: 9 MG/DL (ref 8.5–10.1)
CHLORIDE SERPL-SCNC: 114 MMOL/L (ref 97–108)
CO2 SERPL-SCNC: 16 MMOL/L (ref 21–32)
CREAT SERPL-MCNC: 0.83 MG/DL (ref 0.55–1.02)
ERYTHROCYTE [DISTWIDTH] IN BLOOD BY AUTOMATED COUNT: 14.6 % (ref 11.5–14.5)
GLOBULIN SER CALC-MCNC: 3.4 G/DL (ref 2–4)
GLUCOSE BLD STRIP.AUTO-MCNC: 111 MG/DL (ref 65–117)
GLUCOSE SERPL-MCNC: 96 MG/DL (ref 65–100)
HCT VFR BLD AUTO: 37.4 % (ref 35–47)
HGB BLD-MCNC: 12.5 G/DL (ref 11.5–16)
MCH RBC QN AUTO: 28.5 PG (ref 26–34)
MCHC RBC AUTO-ENTMCNC: 33.4 G/DL (ref 30–36.5)
MCV RBC AUTO: 85.4 FL (ref 80–99)
NRBC # BLD: 0 K/UL (ref 0–0.01)
NRBC BLD-RTO: 0 PER 100 WBC
PLATELET # BLD AUTO: 261 K/UL (ref 150–400)
PMV BLD AUTO: 12.4 FL (ref 8.9–12.9)
POTASSIUM SERPL-SCNC: 4.1 MMOL/L (ref 3.5–5.1)
PROT SERPL-MCNC: 6.3 G/DL (ref 6.4–8.2)
RBC # BLD AUTO: 4.38 M/UL (ref 3.8–5.2)
RPR SER QL: NONREACTIVE
SERVICE CMNT-IMP: NORMAL
SODIUM SERPL-SCNC: 139 MMOL/L (ref 136–145)
TSH SERPL DL<=0.05 MIU/L-ACNC: 1.72 UIU/ML (ref 0.36–3.74)
WBC # BLD AUTO: 19.1 K/UL (ref 3.6–11)

## 2024-03-19 PROCEDURE — 86870 RBC ANTIBODY IDENTIFICATION: CPT

## 2024-03-19 PROCEDURE — 6370000000 HC RX 637 (ALT 250 FOR IP)

## 2024-03-19 PROCEDURE — 86920 COMPATIBILITY TEST SPIN: CPT

## 2024-03-19 PROCEDURE — 6370000000 HC RX 637 (ALT 250 FOR IP): Performed by: OBSTETRICS & GYNECOLOGY

## 2024-03-19 PROCEDURE — 6360000002 HC RX W HCPCS

## 2024-03-19 PROCEDURE — 2580000003 HC RX 258: Performed by: ADVANCED PRACTICE MIDWIFE

## 2024-03-19 PROCEDURE — 86592 SYPHILIS TEST NON-TREP QUAL: CPT

## 2024-03-19 PROCEDURE — 51701 INSERT BLADDER CATHETER: CPT

## 2024-03-19 PROCEDURE — 7210000100 HC LABOR FEE PER 1 HR: Performed by: OBSTETRICS & GYNECOLOGY

## 2024-03-19 PROCEDURE — 4A1HXCZ MONITORING OF PRODUCTS OF CONCEPTION, CARDIAC RATE, EXTERNAL APPROACH: ICD-10-PCS | Performed by: OBSTETRICS & GYNECOLOGY

## 2024-03-19 PROCEDURE — 36415 COLL VENOUS BLD VENIPUNCTURE: CPT

## 2024-03-19 PROCEDURE — 86901 BLOOD TYPING SEROLOGIC RH(D): CPT

## 2024-03-19 PROCEDURE — 85027 COMPLETE CBC AUTOMATED: CPT

## 2024-03-19 PROCEDURE — 82962 GLUCOSE BLOOD TEST: CPT

## 2024-03-19 PROCEDURE — 7220000101 HC DELIVERY VAGINAL/SINGLE: Performed by: OBSTETRICS & GYNECOLOGY

## 2024-03-19 PROCEDURE — 84443 ASSAY THYROID STIM HORMONE: CPT

## 2024-03-19 PROCEDURE — 86922 COMPATIBILITY TEST ANTIGLOB: CPT

## 2024-03-19 PROCEDURE — 1120000000 HC RM PRIVATE OB

## 2024-03-19 PROCEDURE — 6360000002 HC RX W HCPCS: Performed by: ADVANCED PRACTICE MIDWIFE

## 2024-03-19 PROCEDURE — 80053 COMPREHEN METABOLIC PANEL: CPT

## 2024-03-19 PROCEDURE — 2500000003 HC RX 250 WO HCPCS

## 2024-03-19 PROCEDURE — 0KQM0ZZ REPAIR PERINEUM MUSCLE, OPEN APPROACH: ICD-10-PCS | Performed by: OBSTETRICS & GYNECOLOGY

## 2024-03-19 PROCEDURE — 86850 RBC ANTIBODY SCREEN: CPT

## 2024-03-19 PROCEDURE — 86900 BLOOD TYPING SEROLOGIC ABO: CPT

## 2024-03-19 PROCEDURE — 86921 COMPATIBILITY TEST INCUBATE: CPT

## 2024-03-19 RX ORDER — SODIUM CHLORIDE, SODIUM LACTATE, POTASSIUM CHLORIDE, AND CALCIUM CHLORIDE .6; .31; .03; .02 G/100ML; G/100ML; G/100ML; G/100ML
1000 INJECTION, SOLUTION INTRAVENOUS PRN
Status: DISCONTINUED | OUTPATIENT
Start: 2024-03-19 | End: 2024-03-21 | Stop reason: HOSPADM

## 2024-03-19 RX ORDER — TRANEXAMIC ACID 10 MG/ML
1000 INJECTION, SOLUTION INTRAVENOUS
Status: ACTIVE | OUTPATIENT
Start: 2024-03-19 | End: 2024-03-20

## 2024-03-19 RX ORDER — SODIUM CHLORIDE 0.9 % (FLUSH) 0.9 %
5-40 SYRINGE (ML) INJECTION PRN
Status: DISCONTINUED | OUTPATIENT
Start: 2024-03-19 | End: 2024-03-21 | Stop reason: HOSPADM

## 2024-03-19 RX ORDER — SODIUM CHLORIDE 0.9 % (FLUSH) 0.9 %
5-40 SYRINGE (ML) INJECTION EVERY 12 HOURS SCHEDULED
Status: DISCONTINUED | OUTPATIENT
Start: 2024-03-19 | End: 2024-03-21 | Stop reason: HOSPADM

## 2024-03-19 RX ORDER — SODIUM CHLORIDE, SODIUM LACTATE, POTASSIUM CHLORIDE, AND CALCIUM CHLORIDE .6; .31; .03; .02 G/100ML; G/100ML; G/100ML; G/100ML
500 INJECTION, SOLUTION INTRAVENOUS PRN
Status: DISCONTINUED | OUTPATIENT
Start: 2024-03-19 | End: 2024-03-21 | Stop reason: HOSPADM

## 2024-03-19 RX ORDER — LANOLIN/MINERAL OIL
LOTION (ML) TOPICAL PRN
Status: DISCONTINUED | OUTPATIENT
Start: 2024-03-19 | End: 2024-03-21 | Stop reason: HOSPADM

## 2024-03-19 RX ORDER — SODIUM CHLORIDE 9 MG/ML
25 INJECTION, SOLUTION INTRAVENOUS PRN
Status: DISCONTINUED | OUTPATIENT
Start: 2024-03-19 | End: 2024-03-21 | Stop reason: HOSPADM

## 2024-03-19 RX ORDER — ONDANSETRON 2 MG/ML
4 INJECTION INTRAMUSCULAR; INTRAVENOUS EVERY 6 HOURS PRN
Status: DISCONTINUED | OUTPATIENT
Start: 2024-03-19 | End: 2024-03-19

## 2024-03-19 RX ORDER — INSULIN LISPRO 100 [IU]/ML
4 INJECTION, SOLUTION INTRAVENOUS; SUBCUTANEOUS ONCE
Status: DISCONTINUED | OUTPATIENT
Start: 2024-03-19 | End: 2024-03-19

## 2024-03-19 RX ORDER — SODIUM CHLORIDE, SODIUM LACTATE, POTASSIUM CHLORIDE, CALCIUM CHLORIDE 600; 310; 30; 20 MG/100ML; MG/100ML; MG/100ML; MG/100ML
INJECTION, SOLUTION INTRAVENOUS CONTINUOUS
Status: DISCONTINUED | OUTPATIENT
Start: 2024-03-19 | End: 2024-03-19

## 2024-03-19 RX ORDER — LIDOCAINE HYDROCHLORIDE 10 MG/ML
20 INJECTION, SOLUTION INFILTRATION; PERINEURAL ONCE
Status: COMPLETED | OUTPATIENT
Start: 2024-03-19 | End: 2024-03-19

## 2024-03-19 RX ORDER — IBUPROFEN 800 MG/1
800 TABLET ORAL EVERY 8 HOURS SCHEDULED
Status: DISCONTINUED | OUTPATIENT
Start: 2024-03-19 | End: 2024-03-21 | Stop reason: HOSPADM

## 2024-03-19 RX ORDER — IBUPROFEN 800 MG/1
800 TABLET ORAL EVERY 8 HOURS SCHEDULED
Status: DISCONTINUED | OUTPATIENT
Start: 2024-03-19 | End: 2024-03-19

## 2024-03-19 RX ORDER — IBUPROFEN 800 MG/1
TABLET ORAL
Status: COMPLETED
Start: 2024-03-19 | End: 2024-03-19

## 2024-03-19 RX ORDER — METHYLERGONOVINE MALEATE 0.2 MG/ML
200 INJECTION INTRAVENOUS PRN
Status: DISCONTINUED | OUTPATIENT
Start: 2024-03-19 | End: 2024-03-21 | Stop reason: HOSPADM

## 2024-03-19 RX ORDER — ONDANSETRON 4 MG/1
4 TABLET, ORALLY DISINTEGRATING ORAL EVERY 6 HOURS PRN
Status: DISCONTINUED | OUTPATIENT
Start: 2024-03-19 | End: 2024-03-21 | Stop reason: HOSPADM

## 2024-03-19 RX ORDER — SODIUM CHLORIDE 9 MG/ML
INJECTION, SOLUTION INTRAVENOUS PRN
Status: DISCONTINUED | OUTPATIENT
Start: 2024-03-19 | End: 2024-03-21 | Stop reason: HOSPADM

## 2024-03-19 RX ORDER — FENTANYL/BUPIVACAINE/NS/PF 2-1250MCG
10 PLASTIC BAG, INJECTION (ML) INJECTION CONTINUOUS
Status: DISCONTINUED | OUTPATIENT
Start: 2024-03-19 | End: 2024-03-19

## 2024-03-19 RX ORDER — DOCUSATE SODIUM 100 MG/1
100 CAPSULE, LIQUID FILLED ORAL 2 TIMES DAILY
Status: DISCONTINUED | OUTPATIENT
Start: 2024-03-19 | End: 2024-03-21 | Stop reason: HOSPADM

## 2024-03-19 RX ORDER — ONDANSETRON 2 MG/ML
4 INJECTION INTRAMUSCULAR; INTRAVENOUS EVERY 6 HOURS PRN
Status: DISCONTINUED | OUTPATIENT
Start: 2024-03-19 | End: 2024-03-21 | Stop reason: HOSPADM

## 2024-03-19 RX ORDER — ACETAMINOPHEN 500 MG
1000 TABLET ORAL EVERY 8 HOURS SCHEDULED
Status: DISCONTINUED | OUTPATIENT
Start: 2024-03-19 | End: 2024-03-21 | Stop reason: HOSPADM

## 2024-03-19 RX ORDER — ACETAMINOPHEN 325 MG/1
650 TABLET ORAL EVERY 4 HOURS PRN
Status: DISCONTINUED | OUTPATIENT
Start: 2024-03-19 | End: 2024-03-21 | Stop reason: HOSPADM

## 2024-03-19 RX ORDER — MISOPROSTOL 200 UG/1
400 TABLET ORAL PRN
Status: DISCONTINUED | OUTPATIENT
Start: 2024-03-19 | End: 2024-03-21 | Stop reason: HOSPADM

## 2024-03-19 RX ORDER — TERBUTALINE SULFATE 1 MG/ML
0.25 INJECTION, SOLUTION SUBCUTANEOUS
Status: ACTIVE | OUTPATIENT
Start: 2024-03-19 | End: 2024-03-20

## 2024-03-19 RX ORDER — ONDANSETRON 2 MG/ML
INJECTION INTRAMUSCULAR; INTRAVENOUS
Status: COMPLETED
Start: 2024-03-19 | End: 2024-03-19

## 2024-03-19 RX ORDER — NALOXONE HYDROCHLORIDE 0.4 MG/ML
INJECTION, SOLUTION INTRAMUSCULAR; INTRAVENOUS; SUBCUTANEOUS PRN
Status: DISCONTINUED | OUTPATIENT
Start: 2024-03-19 | End: 2024-03-19

## 2024-03-19 RX ORDER — CARBOPROST TROMETHAMINE 250 UG/ML
250 INJECTION, SOLUTION INTRAMUSCULAR PRN
Status: DISCONTINUED | OUTPATIENT
Start: 2024-03-19 | End: 2024-03-21 | Stop reason: HOSPADM

## 2024-03-19 RX ADMIN — ONDANSETRON 4 MG: 2 INJECTION INTRAMUSCULAR; INTRAVENOUS at 07:19

## 2024-03-19 RX ADMIN — LIDOCAINE HYDROCHLORIDE 20 ML: 10 INJECTION, SOLUTION INFILTRATION; PERINEURAL at 10:29

## 2024-03-19 RX ADMIN — IBUPROFEN 800 MG: 800 TABLET ORAL at 19:38

## 2024-03-19 RX ADMIN — OXYTOCIN 87.3 MILLI-UNITS/MIN: 10 INJECTION, SOLUTION INTRAMUSCULAR; INTRAVENOUS at 10:43

## 2024-03-19 RX ADMIN — Medication 166.7 ML: at 10:25

## 2024-03-19 RX ADMIN — DOCUSATE SODIUM 100 MG: 100 CAPSULE, LIQUID FILLED ORAL at 19:36

## 2024-03-19 RX ADMIN — CEFAZOLIN 2000 MG: 1 INJECTION, POWDER, FOR SOLUTION INTRAMUSCULAR; INTRAVENOUS at 07:10

## 2024-03-19 RX ADMIN — LIDOCAINE HYDROCHLORIDE 20 ML: 10 INJECTION, SOLUTION EPIDURAL; INFILTRATION; INTRACAUDAL; PERINEURAL at 10:29

## 2024-03-19 RX ADMIN — IBUPROFEN 800 MG: 800 TABLET, FILM COATED ORAL at 11:15

## 2024-03-19 RX ADMIN — IBUPROFEN 800 MG: 800 TABLET ORAL at 11:15

## 2024-03-19 ASSESSMENT — PAIN - FUNCTIONAL ASSESSMENT: PAIN_FUNCTIONAL_ASSESSMENT: ACTIVITIES ARE NOT PREVENTED

## 2024-03-19 ASSESSMENT — PAIN DESCRIPTION - DESCRIPTORS
DESCRIPTORS: ACHING;SORE
DESCRIPTORS: CRAMPING

## 2024-03-19 ASSESSMENT — PAIN SCALES - GENERAL
PAINLEVEL_OUTOF10: 3
PAINLEVEL_OUTOF10: 3

## 2024-03-19 ASSESSMENT — PAIN DESCRIPTION - LOCATION
LOCATION: PERINEUM
LOCATION: ABDOMEN

## 2024-03-19 ASSESSMENT — PAIN DESCRIPTION - ORIENTATION: ORIENTATION: LOWER

## 2024-03-19 NOTE — H&P
History and Physical    Patient: Marycruz Tamez MRN: [id number]  SSN: [id number]    YOB: 1995  Age: 28 y.o.  Sex: female      Subjective:      Marycruz Tamez is a 28 y.o. female who is a  at 38.3 weeks gestation ( JARRETT 2024). She reports she is having stronger contractions than when she arrived in the hospital earlier this morning. Pt states she was told she may not be a candidate for an epidural due to her history of ankylosing spondylitis.   Pt is adamant on not wanting IV Pain medication for pain relief.     Past Medical History:   Diagnosis Date    Adverse effect of anesthesia     Patient stated she woke up \"In terror\" post op    Ankylosing spondylitis (HCC) 2020    Asthma     Autoimmune disorder (HCC)     Ankylosing spondylitis    Broken foot 2019    right foot, no surgery-wore boot/cast for 6 weeks    Chronic pain     generalized, ankle to back    Drug effect     Gestational diabetes     Pregnancy with due date 24    Migraine     Migraines     Muscle spasm     Pap smear for cervical cancer screening 11/10/2021    negative    Premature birth     at 28 weeks    Routine Papanicolaou smear 19 neg    Scoliosis     Tachycardia 2021    TMJ (temporomandibular joint syndrome)      Past Surgical History:   Procedure Laterality Date    ANKLE SURGERY Right     KNEE ARTHROSCOPY Left 2018      Family History   Problem Relation Age of Onset    Other Maternal Grandfather         CFH    Breast Cancer Paternal Grandmother     Breast Cancer Other     Thyroid Disease Sister     No Known Problems Mother     No Known Problems Father     Lupus Sister     Other Sister         common variable immune deficiency    Neuropathy Sister      Social History     Tobacco Use    Smoking status: Never     Passive exposure: Never    Smokeless tobacco: Never   Substance Use Topics    Alcohol use: No      Prior to Admission medications    Medication Sig Start Date End Date Taking? Authorizing Provider

## 2024-03-19 NOTE — PROGRESS NOTES
0700: Bedside and Verbal shift change report given to Dwain RN (oncoming nurse) by Marie RN (offgoing nurse). Report included the following information Nurse Handoff Report, Index, Intake/Output, MAR, Recent Results, and Med Rec Status.   CNSHILOH Mena at bedside, CNM stated pt can be taken off the monitor to shower.    0722: Pt taken off the monitor to shower.    0802: Pt out of the shower, EFM applied.    0806: Pt taken off the monitor.      0818: EFM applied.    0903: Pt taken off the monitor and on the birthing ball. Pt coping well. Pt continuing to decline anesthesia consult for epidural.    0920: Dr. Curtis at bedside, SVE per MD 7-8cm    0924: EFM applied.    0940: Pt reporting urge to push with last contraction and requesting exam. SVE per this RN 8-9/100/-1. Charge RN updating Dr. Curtis.    0950: Pt reporting urge to push. SVE 10/100/-1. Bulging bag.     1000: Dr. Curtis at bedside.    1007: Patient actively pushing.  RN remains in continuous attendance at the bedside.  Assessment & evaluation of fetal heart rate ongoing via continuous EFM.  SROM at this time.    1022: RN remained at bedside throughout pushing.  EFM continuously assessed.  Vaginal delivery of viable infant.    1025: Delivery of placenta. MD repairing laceration.    1035: Repairs complete, yash-care provided, pads changed and ice pack applied to perineum.    1210: Dr. Curtis updated on mild range BPS and Maternal heart rate. MD ordering a TSH.    1500: Bedside and Verbal shift change report given to Braydon RN (oncoming nurse) by Dwain RN (offgoing nurse). Report included the following information Nurse Handoff Report, Index, Intake/Output, MAR, Recent Results, and Med Rec Status.

## 2024-03-19 NOTE — L&D DELIVERY NOTE
Carolyn Tamez [[id number]]      Labor Events     Labor: No   Steroids: None  Cervical Ripening Date/Time:      Antibiotics Received during Labor: Yes  Rupture Identifier: Sac 1  Rupture Date/Time:  3/19/24 10:07:00   Rupture Type: SROM  Fluid Color: Clear  Fluid Odor: None  Fluid Volume: Moderate  Induction: None  Augmentation: None  Labor Complications: None              Anesthesia    Method: Local       Labor Event Times      Labor onset date/time:  3/19/24 06:00:00     Dilation complete date/time:  3/19/24 09:50:00     Start pushing date/time:  3/19/2024 10:07:00   Decision date/time (emergent ):            Delivery Details      Delivery Date: 3/19/24 Delivery Time: 10:22:00   Delivery Type: Vaginal, Spontaneous              [place] Presentation    Presentation: Vertex  Position: Left  _: Occiput  _: Anterior       Shoulder Dystocia    Shoulder Dystocia Present?: No       Assisted Delivery Details    Forceps Attempted?: No  Vacuum Extractor Attempted?: No                           Cord    Vessels: 3 Vessels  Complications: Nuchal Loose  Cord Around: Head  Delayed Cord Clamping?: Yes  Cord Clamped Date/Time: 3/19/2024 10:23:00  Cord Blood Disposition: Lab  Gases Sent?: No              Placenta    Date/Time: 3/19/2024 10:25:00  Removal: Spontaneous  Appearance: Intact  Disposition: Discarded       Lacerations    Episiotomy: None  Perineal Lacerations: 2nd  Other Lacerations: no non-perineal laceration       Vaginal Counts    Initial Count Personnel: YESENIA JONES  Initial Count Verified By: DR. WELLER  Intial Sponge Count: Correct Intial Needles Count: Correct Intial Instruments Count: Correct   Final Sponges Count: Correct  Final Instruments Count: Correct   Final Count Personnel: YESENIA JONES  Final Count Verified By: DR. WELLER  Accurate Final Count?: Yes       Blood Loss  Mother: Marycruz Tamez #[id number]     Start of Mother's Information      Delivery Blood Loss  24 0600

## 2024-03-19 NOTE — PROGRESS NOTES
0415-Patient arrived to labor and delivery with complaints of contractions since 0030.    0445-TIKI Mena CNM notified of patients arrival and history, orders received to perform SVE.    0500-TIKI Mena CNM notified of cervical exam, TIKI Mena CNM stating patient may ambulate and will recheck cervix in two hours.    0505-Patient denies wanting to ambulate at this time.    0640-TIKI Mena CNM at bedside for SVE.

## 2024-03-20 ENCOUNTER — PATIENT MESSAGE (OUTPATIENT)
Age: 29
End: 2024-03-20

## 2024-03-20 PROCEDURE — 59400 OBSTETRICAL CARE: CPT | Performed by: OBSTETRICS & GYNECOLOGY

## 2024-03-20 PROCEDURE — 1120000000 HC RM PRIVATE OB

## 2024-03-20 PROCEDURE — 6370000000 HC RX 637 (ALT 250 FOR IP): Performed by: OBSTETRICS & GYNECOLOGY

## 2024-03-20 PROCEDURE — 94761 N-INVAS EAR/PLS OXIMETRY MLT: CPT

## 2024-03-20 RX ORDER — IBUPROFEN 800 MG/1
800 TABLET ORAL EVERY 8 HOURS PRN
Qty: 30 TABLET | Refills: 0 | Status: SHIPPED | OUTPATIENT
Start: 2024-03-20 | End: 2024-03-27

## 2024-03-20 RX ADMIN — IBUPROFEN 800 MG: 800 TABLET ORAL at 03:44

## 2024-03-20 RX ADMIN — IBUPROFEN 800 MG: 800 TABLET ORAL at 19:40

## 2024-03-20 RX ADMIN — IBUPROFEN 800 MG: 800 TABLET ORAL at 11:49

## 2024-03-20 RX ADMIN — DOCUSATE SODIUM 100 MG: 100 CAPSULE, LIQUID FILLED ORAL at 08:30

## 2024-03-20 RX ADMIN — ACETAMINOPHEN 1000 MG: 500 TABLET ORAL at 08:30

## 2024-03-20 RX ADMIN — DOCUSATE SODIUM 100 MG: 100 CAPSULE, LIQUID FILLED ORAL at 19:43

## 2024-03-20 ASSESSMENT — PAIN - FUNCTIONAL ASSESSMENT
PAIN_FUNCTIONAL_ASSESSMENT: ACTIVITIES ARE NOT PREVENTED

## 2024-03-20 ASSESSMENT — PAIN DESCRIPTION - LOCATION
LOCATION: PERINEUM
LOCATION: ABDOMEN

## 2024-03-20 ASSESSMENT — PAIN DESCRIPTION - DESCRIPTORS
DESCRIPTORS: CRAMPING
DESCRIPTORS: ACHING;CRAMPING
DESCRIPTORS: SORE;CRAMPING
DESCRIPTORS: SORE;DISCOMFORT

## 2024-03-20 ASSESSMENT — PAIN SCALES - GENERAL
PAINLEVEL_OUTOF10: 2
PAINLEVEL_OUTOF10: 2
PAINLEVEL_OUTOF10: 4
PAINLEVEL_OUTOF10: 2

## 2024-03-20 ASSESSMENT — PAIN DESCRIPTION - ORIENTATION
ORIENTATION: LOWER;MID
ORIENTATION: ANTERIOR
ORIENTATION: LOWER;MID
ORIENTATION: LOWER

## 2024-03-20 NOTE — PROGRESS NOTES
Post-Partum Progress Note    Patient doing well post-partum without significant complaint.  She is voiding without difficulty, she reports normal lochia. Her pain is well controlled with oral pain medication. She is tolerating a general diet.    Delivery Type: Vaginal, Spontaneous    By Delivering Clinician:ALEJANDRO WELLER    Delivery Date /Time: 3/19/2024  10:22 AM      Information for the patient's :  Dashawn, Female Marycruz [[id number]]   Vaginal, Spontaneous        Vitals:  Patient Vitals for the past 8 hrs:   BP Temp Temp src Pulse Resp SpO2   24 0727 126/76 98.1 °F (36.7 °C) Oral (!) 107 18 96 %   24 0310 122/88 98.4 °F (36.9 °C) -- (!) 117 16 98 %     Temp (24hrs), Av.2 °F (36.8 °C), Min:97.9 °F (36.6 °C), Max:98.7 °F (37.1 °C)    Vitals:    24 0727   BP: 126/76   Pulse: (!) 107   Resp: 18   Temp: 98.1 °F (36.7 °C)   SpO2: 96%       Exam:    General: Patient without distress.  Abdomen: soft, fundus firm at level of umbilicus, nontender  Lower extremities: negative for cords or tenderness.    Labs:   Recent Results (from the past 24 hour(s))   POCT Glucose    Collection Time: 24  9:27 AM   Result Value Ref Range    POC Glucose 111 65 - 117 mg/dL    Performed by: Dwain Casey    TSH    Collection Time: 24 12:56 PM   Result Value Ref Range    TSH, 3RD GENERATION 1.72 0.36 - 3.74 uIU/mL       Assessment:    1. Postpartum S/P spontaneous vaginal delivery   2. Patient doing well without significant complications    Plan:  1. Continue routine postpartum care  2. Routine perineal care and maternal education   3. Oral pain medications and bowel regimen as needed           Jeannette Cheung MD

## 2024-03-20 NOTE — LACTATION NOTE
This note was copied from a baby's chart.  This is mothers first baby. Mother states she is having latch difficulty and has been trying to breastfeed baby but baby won't stay on breast very long. Mother states baby has been spitty and gaggy. She hand expressed drops for feedings last night when baby would not latch onto breast. Mother had pumped prior to visit and was giving baby her colostrum in a bottle when LC came to visit. She got 3 ml which was given to baby and taken well. LC encouraged mother to offer baby her breast first (Q 2-3 hours) and if baby does not latch on she is to pump for 20 minutes. Reviewed storage and preparation of expressed breast milk for baby. Mother has a pump for home use if needed. LC discussed the option of donor breast milk if she does not get any breast milk when she pumps.    Discussed with mother her plan for feeding.  Reviewed the benefits of exclusive breast milk feeding during the hospital stay.   Informed her of the risks of using formula to supplement in the first few days of life as well as the benefits of successful breast milk feeding; referred her to the Breastfeeding booklet about this information.   She acknowledges understanding of information reviewed and states that it is her plan to breastfeed her infant.  Will support her choice and offer additional information as needed.       Encouraged mom to attempt feeding with baby led feeding cues. Just as sucking on fingers, rooting, mouthing.   Looking for 8-12 feedings in 24 hours.   Don't limit baby at breast, allow baby to come of breast on it's own. Baby may want to feed  often and may increase number of feedings on second day of life. Skin to skin encouraged.      If baby doesn't nurse,  Mom should  hand express  10-20 drops of colostrum and drip into baby's mouth, or give to baby by finger feeding, cup feeding, or spoon feeding at least every 2-3 hours.     Pumping:  Guidelines for pumping, milk collection and

## 2024-03-21 VITALS
HEART RATE: 95 BPM | TEMPERATURE: 98.4 F | OXYGEN SATURATION: 99 % | RESPIRATION RATE: 16 BRPM | SYSTOLIC BLOOD PRESSURE: 119 MMHG | DIASTOLIC BLOOD PRESSURE: 83 MMHG

## 2024-03-21 LAB
A VAGINAE DNA VAG QL NAA+PROBE: NORMAL SCORE
BVAB2 DNA VAG QL NAA+PROBE: NORMAL SCORE
C ALBICANS DNA VAG QL NAA+PROBE: NEGATIVE
C GLABRATA DNA VAG QL NAA+PROBE: NEGATIVE
C TRACH DNA VAG QL NAA+PROBE: NEGATIVE
MEGA1 DNA VAG QL NAA+PROBE: NORMAL SCORE
N GONORRHOEA DNA VAG QL NAA+PROBE: NEGATIVE
T VAGINALIS DNA VAG QL NAA+PROBE: NEGATIVE

## 2024-03-21 PROCEDURE — 99024 POSTOP FOLLOW-UP VISIT: CPT | Performed by: OBSTETRICS & GYNECOLOGY

## 2024-03-21 PROCEDURE — 94761 N-INVAS EAR/PLS OXIMETRY MLT: CPT

## 2024-03-21 PROCEDURE — 6370000000 HC RX 637 (ALT 250 FOR IP): Performed by: OBSTETRICS & GYNECOLOGY

## 2024-03-21 RX ADMIN — DOCUSATE SODIUM 100 MG: 100 CAPSULE, LIQUID FILLED ORAL at 07:42

## 2024-03-21 RX ADMIN — IBUPROFEN 800 MG: 800 TABLET ORAL at 03:02

## 2024-03-21 ASSESSMENT — PAIN DESCRIPTION - LOCATION: LOCATION: PERINEUM

## 2024-03-21 ASSESSMENT — PAIN DESCRIPTION - ORIENTATION: ORIENTATION: LOWER

## 2024-03-21 ASSESSMENT — PAIN - FUNCTIONAL ASSESSMENT: PAIN_FUNCTIONAL_ASSESSMENT: ACTIVITIES ARE NOT PREVENTED

## 2024-03-21 ASSESSMENT — PAIN SCALES - GENERAL: PAINLEVEL_OUTOF10: 2

## 2024-03-21 ASSESSMENT — PAIN DESCRIPTION - DESCRIPTORS: DESCRIPTORS: SORE

## 2024-03-21 NOTE — DISCHARGE INSTRUCTIONS
glasses of water daily; avoid excessive caffeine intake.  http://www.choosemyplate.gov/healthy-eating-tips.html  http://www.choosemyplate.gov/pregnancy-breastfeeding.html    Medications:   See discharge medication list and read instructions carefully.    Breast Feeding:  See instructions from your lactation consult.  Call [phone] [[phone]] for more information or to locate a lactation consultant.  http://www.cdc.gov/breastfeeding/index.htm    Vaccines:  If you received the MMR vaccine postpartum you should wait three months until you get pregnant again.    You, and close contacts, should make sure that the Tdap vaccine is up to date.    This vaccine can decrease the risk of your baby getting pertussis or \"whooping cough.\"    You, and close contacts, should receive the influenza vaccine during flu season when appropriate.  http://www.cdc.gov/vaccines/hcp/acip-recs/index.html    Tobacco Use:  If you (or other people around the baby) smoke or use tobacco products, please try to  use and quit to improve your health and decrease risk to your baby.  Http://www.[facility].[place].gov/ofhs/prevention/tucp/quitNow.htm    Swelling in your Legs:  There are many fluid changes after delivery and you may have more swelling the first few days after delivery  Continue to drink plenty of water, avoid sitting or standing in one position for too long and elevate your feet above your heart, to help reduce some the pressure you may be feeling in your ankles and legs.    If you have a  section Incision:  Steri-strips or tape strips may be removed gently at home approximately 10-14 days after surgery. Soaking the strips with a warm, wet cloth or taking a shower may make the strips easier to remove.    Metal staples are usually removed within 3 to 7 days, either before you leave the hospital or in the office.  Make an appointment if needed.     Insorb absorbable staples may be used under the skin but you may see small white

## 2024-03-21 NOTE — DISCHARGE SUMMARY
Obstetrical Discharge Summary     Name: Marycruz Tamez MRN: [id number]  SSN: [id number]    YOB: 1995  Age: 28 y.o.  Sex: female      Admit Date: 3/19/2024    Discharge Date:  3/20/24    Admitting Physician: Kedar Sabillon MD     Attending Physician:  Jeannette Cheung MD, FACOG    Admission Diagnoses: Normal labor [O80, Z37.9]    Condition on Discharge: Stable    Procedures:     Disposition: to home    Follow up:    Call  as soon as possible to schedule for your six week postpartum visit.    Follow-up Information       Follow up With Specialties Details Why Contact Info    Jeannette Cheung MD Obstetrics & Gynecology, Gynecology, Obstetrics Follow up in 6 week(s) postpartum visit [address] [address]  [phone]               Discharge Diagnoses:   Dashawn, Female Marycruz [[id number]]       Information    Head delivery date/time: 3/19/2024 10:22:00   Changing the 's delivery date/time could affect patient care.:      Delivery date/time:  3/19/24 1022   Delivery type: Vaginal, Spontaneous    Details:                   Delivery Type: Vaginal, Spontaneous    By Delivering Clinician:ALEJANDRO WELLER    Delivery Date /Time: 3/19/2024  10:22 AM        Additional Diagnoses:   Patient Active Problem List   Diagnosis    Long-term use of immunosuppressant medication    Scoliosis    Asthma    Ankylosing spondylitis of lumbar region (HCC)    Ankylosing spondylitis (HCC)    Tachycardia    Prenatal care of multigravida, antepartum    Obesity affecting pregnancy in second trimester    White classification A2 gestational diabetes mellitus (GDM), insulin controlled    Obesity, Class II, BMI 35-39.9    History of tachycardia    Proteinuria affecting pregnancy in third trimester     (spontaneous vaginal delivery)        Prenatal Labs:    No components found for: \"OBEXTABORH\", \"OBEXTABSCRN\", \"OBEXTRUBELLA\", \"OBEXTGRBS\", \"OBEXTHBSAG\", \"OBEXTHIV\",

## 2024-03-21 NOTE — LACTATION NOTE
This note was copied from a baby's chart.  Baby very fussy in room. Father able to calm baby to sleep.  Mother states baby just took 6 ml of pumped milk.  Mother states she is pumping because baby is not feeding well at breast. Offered to assist mother with breastfeeding.  Attempted BF in side-lying position.  Baby latched with shield however no sucks achieved.  Explained that at this point baby should be feeding at breast every 2-3 hours for at least 5 minutes and having long feedings as well.  Baby should be maintain rhythmic sucking consistently during feeding. Lots of info reviewed and printed info given.  Encouraged parents to supplement until baby is able to consistently feed well at breast or mother is able to produce needed expressed milk volume. Parents have ped appt tomorrow am.     Chart shows numerous feedings, void, stool WNL.  Discussed importance of monitoring outputs and feedings on first week of life.  Discussed ways to tell if baby is  getting enough breast milk, ie  voids and stools, change in color of stool, and return to birth wt within 2 weeks.  Follow up with pediatrician visit for weight check in 1-2 days (per AAP guidelines.)  Encouraged to call Warm Line  [phone]  for any questions/problems that arise. Mother also given breastfeeding support group dates and times for any future needs    Engorgement Care Guidelines:  Reviewed how milk is made and normal phases of milk production.  Taught care of engorged breasts - physiologic breastfeeding encouraged with use of cool packs (no ice directly on skin). Consider use of NSAIDS where appropriate for discomfort and inflammation. Can employ light touch, lymphatic drainage techniques on tender grandular tissues. Anticipatory guidance shared.    Pt will successfully establish breastfeeding by feeding in response to early feeding cues   or wake every 3h, will obtain deep latch, and will keep log of feedings/output.  Taught to BF at hunger cues and or

## 2024-03-21 NOTE — PROGRESS NOTES
Post-Partum Progress Note    Patient doing well post-partum without significant complaint.  She is voiding without difficulty, she reports normal lochia. Her pain is well controlled with oral pain medication. She is tolerating a general diet.    Delivery Type: Vaginal, Spontaneous    By Delivering Clinician:ALEJANDRO WELLER    Delivery Date /Time: 3/19/2024  10:22 AM      Information for the patient's :  Carolyn Tamez [[id number]]   Vaginal, Spontaneous        Vitals:  Patient Vitals for the past 8 hrs:   BP Temp Temp src Pulse Resp SpO2   24 0756 119/83 98.4 °F (36.9 °C) Oral 95 16 99 %   24 0454 120/80 98 °F (36.7 °C) Oral 99 16 99 %     Temp (24hrs), Av.2 °F (36.8 °C), Min:97.9 °F (36.6 °C), Max:98.4 °F (36.9 °C)    Vitals:    24 0756   BP: 119/83   Pulse: 95   Resp: 16   Temp: 98.4 °F (36.9 °C)   SpO2: 99%       Exam:    General: Patient without distress.  Abdomen: soft, fundus firm at level of umbilicus, nontender  Lower extremities: negative for cords or tenderness.    Labs: No results found for this or any previous visit (from the past 24 hour(s)).    Assessment:    1. Postpartum S/P spontaneous vaginal delivery   2. Patient doing well without significant complications    Plan:  1. Continue routine postpartum care  2. Routine perineal care and maternal education   3. Oral pain medications and bowel regimen as needed           Jeannette Cheung MD

## 2024-03-22 LAB
ABO + RH BLD: NORMAL
BLD PROD TYP BPU: NORMAL
BLD PROD TYP BPU: NORMAL
BLOOD BANK DISPENSE STATUS: NORMAL
BLOOD BANK DISPENSE STATUS: NORMAL
BLOOD GROUP ANTIBODIES SERPL: NORMAL
BLOOD GROUP ANTIBODIES SERPL: NORMAL
BPU ID: NORMAL
BPU ID: NORMAL
CROSSMATCH RESULT: NORMAL
CROSSMATCH RESULT: NORMAL
SPECIMEN EXP DATE BLD: NORMAL
UNIT DIVISION: 0
UNIT DIVISION: 0

## 2024-03-23 ENCOUNTER — HOSPITAL ENCOUNTER (OUTPATIENT)
Facility: HOSPITAL | Age: 29
Setting detail: OBSERVATION
Discharge: HOME | End: 2024-03-24
Attending: STUDENT IN AN ORGANIZED HEALTH CARE EDUCATION/TRAINING PROGRAM | Admitting: OBSTETRICS & GYNECOLOGY
Payer: COMMERCIAL

## 2024-03-23 PROCEDURE — G0378 HOSPITAL OBSERVATION PER HR: HCPCS

## 2024-03-23 PROCEDURE — 99285 EMERGENCY DEPT VISIT HI MDM: CPT

## 2024-03-23 PROCEDURE — 80053 COMPREHEN METABOLIC PANEL: CPT

## 2024-03-23 PROCEDURE — 36415 COLL VENOUS BLD VENIPUNCTURE: CPT

## 2024-03-23 PROCEDURE — 85027 COMPLETE CBC AUTOMATED: CPT

## 2024-03-23 ASSESSMENT — PAIN - FUNCTIONAL ASSESSMENT: PAIN_FUNCTIONAL_ASSESSMENT: NONE - DENIES PAIN

## 2024-03-24 VITALS
DIASTOLIC BLOOD PRESSURE: 81 MMHG | BODY MASS INDEX: 38.27 KG/M2 | RESPIRATION RATE: 16 BRPM | SYSTOLIC BLOOD PRESSURE: 128 MMHG | WEIGHT: 216 LBS | HEART RATE: 112 BPM | HEIGHT: 63 IN | TEMPERATURE: 98.2 F | OXYGEN SATURATION: 98 %

## 2024-03-24 LAB
ALBUMIN SERPL-MCNC: 2.9 G/DL (ref 3.5–5)
ALBUMIN/GLOB SERPL: 0.9 (ref 1.1–2.2)
ALP SERPL-CCNC: 148 U/L (ref 45–117)
ALT SERPL-CCNC: 29 U/L (ref 12–78)
ANION GAP SERPL CALC-SCNC: 7 MMOL/L (ref 5–15)
AST SERPL-CCNC: 26 U/L (ref 15–37)
BILIRUB SERPL-MCNC: 0.2 MG/DL (ref 0.2–1)
BUN SERPL-MCNC: 16 MG/DL (ref 6–20)
BUN/CREAT SERPL: 21 (ref 12–20)
CALCIUM SERPL-MCNC: 8.9 MG/DL (ref 8.5–10.1)
CHLORIDE SERPL-SCNC: 114 MMOL/L (ref 97–108)
CO2 SERPL-SCNC: 21 MMOL/L (ref 21–32)
CREAT SERPL-MCNC: 0.78 MG/DL (ref 0.55–1.02)
ERYTHROCYTE [DISTWIDTH] IN BLOOD BY AUTOMATED COUNT: 15 % (ref 11.5–14.5)
GLOBULIN SER CALC-MCNC: 3.2 G/DL (ref 2–4)
GLUCOSE SERPL-MCNC: 94 MG/DL (ref 65–100)
HCT VFR BLD AUTO: 31.7 % (ref 35–47)
HGB BLD-MCNC: 10.5 G/DL (ref 11.5–16)
MCH RBC QN AUTO: 28.6 PG (ref 26–34)
MCHC RBC AUTO-ENTMCNC: 33.1 G/DL (ref 30–36.5)
MCV RBC AUTO: 86.4 FL (ref 80–99)
NRBC # BLD: 0 K/UL (ref 0–0.01)
NRBC BLD-RTO: 0 PER 100 WBC
PLATELET # BLD AUTO: 302 K/UL (ref 150–400)
PMV BLD AUTO: 10.2 FL (ref 8.9–12.9)
POTASSIUM SERPL-SCNC: 4 MMOL/L (ref 3.5–5.1)
PROT SERPL-MCNC: 6.1 G/DL (ref 6.4–8.2)
RBC # BLD AUTO: 3.67 M/UL (ref 3.8–5.2)
SODIUM SERPL-SCNC: 142 MMOL/L (ref 136–145)
WBC # BLD AUTO: 15 K/UL (ref 3.6–11)

## 2024-03-24 PROCEDURE — G0378 HOSPITAL OBSERVATION PER HR: HCPCS

## 2024-03-24 PROCEDURE — 99212 OFFICE O/P EST SF 10 MIN: CPT

## 2024-03-24 RX ORDER — ACETAMINOPHEN 500 MG
1000 TABLET ORAL EVERY 8 HOURS SCHEDULED
Status: DISCONTINUED | OUTPATIENT
Start: 2024-03-24 | End: 2024-03-24 | Stop reason: HOSPADM

## 2024-03-24 NOTE — ED PROVIDER NOTES
03/23/2024 10:53:19 PM      PATIENT REFERRED TO:  No follow-up provider specified.    DISCHARGE MEDICATIONS:  New Prescriptions    No medications on file         (Please note that portions of this note were completed with a voice recognition program.  Efforts were made to edit the dictations but occasionally words are mis-transcribed.)    Silvina Muir MD (electronically signed)  Emergency Attending Physician               Silvina Muir MD  03/23/24 0804

## 2024-03-24 NOTE — ED TRIAGE NOTES
Vaginal delivery on Tuesday. Patient state she noticed something hanging out of her vagina while showering tonight, denies pain

## 2024-03-24 NOTE — ED NOTES
TRANSFER - OUT REPORT:    Verbal report given to KAREN Villa on Marycruz Tamez  being transferred to L&D for routine progression of patient care       Report consisted of patient's Situation, Background, Assessment and   Recommendations(SBAR).     Information from the following report(s) Nurse Handoff Report, ED Encounter Summary, ED SBAR, Adult Overview, Intake/Output, MAR, and Recent Results was reviewed with the receiving nurse.    Shelby Fall Assessment:    Presents to emergency department  because of falls (Syncope, seizure, or loss of consciousness): No  Age > 70: No  Altered Mental Status, Intoxication with alcohol or substance confusion (Disorientation, impaired judgment, poor safety awaremess, or inability to follow instructions): No  Impaired Mobility: Ambulates or transfers with assistive devices or assistance; Unable to ambulate or transer.: No  Nursing Judgement: No          Lines:       Opportunity for questions and clarification was provided.      Patient transported with:  Registered Nurse

## 2024-03-24 NOTE — PROGRESS NOTES
Patient has no complaints.  Patient Vitals for the past 24 hrs:   BP Temp Temp src Pulse Resp SpO2 Height Weight   03/24/24 0049 -- -- -- -- -- 98 % -- --   03/24/24 0048 128/81 -- -- (!) 112 -- -- -- --   03/24/24 0044 -- -- -- (!) 113 -- 97 % -- --   03/24/24 0039 -- -- -- (!) 104 -- 98 % -- --   03/24/24 0034 -- -- -- -- -- 97 % -- --   03/24/24 0033 130/88 -- -- (!) 107 -- -- -- --   03/24/24 0029 -- -- -- (!) 107 -- 97 % -- --   03/24/24 0024 -- -- -- (!) 111 -- 97 % -- --   03/24/24 0003 (!) 135/91 98.2 °F (36.8 °C) Oral (!) 116 16 -- -- --   03/23/24 2332 139/80 -- -- (!) 112 -- -- -- --   03/23/24 2300 (!) 143/95 -- -- -- -- 95 % -- --   03/23/24 2255 (!) 147/105 -- -- -- -- 98 % -- --   03/23/24 2204 (!) 134/102 98.2 °F (36.8 °C) -- (!) 115 15 99 % 1.6 m (5' 3\") 98 kg (216 lb)     Physical Exam  Constitutional:       General: She is not in acute distress.  Neurological:      Mental Status: She is alert.       Recent Labs     03/23/24  2359   WBC 15.0*   HGB 10.5*         K 4.0   *   CO2 21   BUN 16   CREATININE 0.78   GLUCOSE 94   ALT 29   AST 26   ALKPHOS 148*     Principal Problem:    Retained portions of placenta or membranes without hemorrhage  Active Problems:    Pre-existing essential hypertension during puerperium  Resolved Problems:    * No resolved hospital problems. *    Will discharge home  Follow up this week for BP check

## 2024-03-24 NOTE — H&P
CFH    Breast Cancer Paternal Grandmother     Breast Cancer Other     Thyroid Disease Sister     No Known Problems Mother     No Known Problems Father     Lupus Sister     Other Sister         common variable immune deficiency    Neuropathy Sister        Review of Systems   All other systems reviewed and are negative.          Objective:     Physical Exam:    Patient Vitals for the past 24 hrs:   BP Temp Temp src Pulse Resp SpO2 Height Weight   03/24/24 0003 (!) 135/91 98.2 °F (36.8 °C) Oral (!) 116 16 -- -- --   03/23/24 2332 139/80 -- -- (!) 112 -- -- -- --   03/23/24 2300 (!) 143/95 -- -- -- -- 95 % -- --   03/23/24 2255 (!) 147/105 -- -- -- -- 98 % -- --   03/23/24 2204 (!) 134/102 98.2 °F (36.8 °C) -- (!) 115 15 99 % 1.6 m (5' 3\") 98 kg (216 lb)       Physical Exam  Constitutional:       General: She is not in acute distress.  HENT:      Head: Normocephalic.   Eyes:      Extraocular Movements: Extraocular movements intact.      Pupils: Pupils are equal, round, and reactive to light.   Cardiovascular:      Rate and Rhythm: Normal rate and regular rhythm.      Heart sounds: No murmur heard.  Pulmonary:      Effort: Pulmonary effort is normal. No respiratory distress.      Breath sounds: Normal breath sounds. No wheezing, rhonchi or rales.   Abdominal:      General: Abdomen is flat.      Tenderness: There is no abdominal tenderness. There is no right CVA tenderness, left CVA tenderness, guarding or rebound.   Genitourinary:     General: Normal vulva.      Vagina: Normal.      Cervix: Normal.      Comments: I removed a strand os retained membranes, that was protruding from her vagina.    A speculum exam revealed a normal cervix, and no more membranes  Musculoskeletal:         General: No swelling.      Cervical back: Normal range of motion.   Skin:     General: Skin is warm and dry.   Neurological:      General: No focal deficit present.      Mental Status: She is alert.      Cranial Nerves: No cranial nerve

## 2024-03-24 NOTE — PROGRESS NOTES
2305  Patient arrives to L&D reporting a piece of tissue she noticed after a shower.  2312  Dr Sabillon at bedside assessing the patient.  A 6cm long membrane removed with ease by Dr Sabillon, and speculum exam completed.  Elevated BP noted, labs ordered.  0050  Dr Sabillon at bedside discussing labs, and POC.  Patient will be discharged home, patient is to make an appointment with OBGYN this coming week for BP check.  0100  patient left L&D.

## 2024-03-24 NOTE — DISCHARGE INSTRUCTIONS
new nausea or vomiting.     You have pain in your belly or pelvis.     You gain weight rapidly.   Where can you learn more?  Go to https://www.[email or web address].net/patientEd and enter Q718 to learn more about \"Learning About Preeclampsia After Childbirth.\"  Current as of: July 10, 2023               Content Version: 14.0  © 6094-3190 [employer].   Care instructions adapted under license by [employer]. If you have questions about a medical condition or this instruction, always ask your healthcare professional. Healthwise, [employer] disclaims any warranty or liability for your use of this information.

## 2024-03-29 ENCOUNTER — OFFICE VISIT (OUTPATIENT)
Age: 29
End: 2024-03-29

## 2024-03-29 VITALS
WEIGHT: 190.8 LBS | BODY MASS INDEX: 33.81 KG/M2 | DIASTOLIC BLOOD PRESSURE: 87 MMHG | HEART RATE: 96 BPM | HEIGHT: 63 IN | SYSTOLIC BLOOD PRESSURE: 134 MMHG

## 2024-03-29 DIAGNOSIS — R03.0 ELEVATED BLOOD PRESSURE READING: Primary | ICD-10-CM

## 2024-03-29 LAB
CLINDAMYCIN ISLT KB: ABNORMAL
ORGANISM: ABNORMAL
SOURCE: ABNORMAL

## 2024-03-29 PROCEDURE — 0503F POSTPARTUM CARE VISIT: CPT | Performed by: OBSTETRICS & GYNECOLOGY

## 2024-03-29 NOTE — PROGRESS NOTES
Rooming note, gyn problem visit:    Chief Complaint   Patient presents with    Postpartum Blood Pressure Check     Marycruz Tamez is a 28 y.o. female presents for a problem visit.    Patient's last menstrual period was 06/25/2023.  Birth Control: recent pregnancy.    Last or next WWE is: due in 3+ months    The patient is reporting having: vag delivery 3/19/24, BP check today d/t elevated BP in L&D. Pt has not been checking BP at home. No problems. PP visit with 2hr GTT scheduled for 5/1/24.     This is not a new problem.  She has experienced this problem before.    She reports the symptoms are has improved.  Aggravating factors include none.  And alleviating factors include none.    She does not have other concerns.

## 2024-03-29 NOTE — PROGRESS NOTES
[facility]  [email or web address]  [email or web address]  [phone]    Jeannette Cheung MD, FACOG      OB/GYN BP check, postpartum visit    HPI  Marycruz Tamez is a , 28 y.o. female who presents for a problem visit.   Chief Complaint   Patient presents with    Postpartum Blood Pressure Check       No headaches.  Bleeding, lighter.  Pain: minimal.  Baby doing well.  Pt seen 3/24 for retained membranes; removed by [facility]. And had elevated bp      Per Rooming Note:  Marycruz Tamez is a 28 y.o. female presents for a problem visit.     Patient's last menstrual period was 2023.  Birth Control: recent pregnancy.     Last or next WWE is: due in 3+ months     The patient is reporting having: vag delivery 3/19/24, BP check today d/t elevated BP in L&D. Pt has not been checking BP at home. No problems. PP visit with 2hr GTT scheduled for 24.      This is not a new problem.  She has experienced this problem before.     She reports the symptoms are has improved.  Aggravating factors include none.  And alleviating factors include none.     She does not have other concerns.       Sexual history and Contraception:  Social History     Substance and Sexual Activity   Sexual Activity Yes    Partners: Male    Birth control/protection: Condom       Past Medical History:   Diagnosis Date    Adverse effect of anesthesia     Patient stated she woke up \"In terror\" post op    Ankylosing spondylitis (HCC) 2020    Asthma     Autoimmune disorder (HCC)     Ankylosing spondylitis    Broken foot 2019    right foot, no surgery-wore boot/cast for 6 weeks    Chronic pain     generalized, ankle to back    Drug effect     Gestational diabetes     Pregnancy with due date 24    Migraine     Migraines     Muscle spasm     Pap smear for cervical cancer screening 11/10/2021    negative    Premature birth     at 28 weeks    Routine Papanicolaou smear

## 2024-04-23 ENCOUNTER — PATIENT MESSAGE (OUTPATIENT)
Age: 29
End: 2024-04-23

## 2024-04-23 DIAGNOSIS — M45.6 ANKYLOSING SPONDYLITIS LUMBAR REGION (HCC): Primary | ICD-10-CM

## 2024-04-24 NOTE — TELEPHONE ENCOUNTER
From: Marycruz Tamez  To: Dr. Luis Almeida  Sent: 4/23/2024 8:19 AM EDT  Subject: Physical therapy script    Good morning, I have been getting my physical therapy script for my back through my ob since I’ve been pregnant. Since I recently had my baby- the office needs a new script for me to continue being seen. Is this something you can provide for my back and neck? I would need it sent to [facility]. Their fax is [phone]

## 2024-05-01 ENCOUNTER — NURSE ONLY (OUTPATIENT)
Age: 29
End: 2024-05-01

## 2024-05-01 ENCOUNTER — POSTPARTUM VISIT (OUTPATIENT)
Age: 29
End: 2024-05-01

## 2024-05-01 VITALS — WEIGHT: 187.8 LBS | SYSTOLIC BLOOD PRESSURE: 118 MMHG | DIASTOLIC BLOOD PRESSURE: 82 MMHG | BODY MASS INDEX: 33.27 KG/M2

## 2024-05-01 DIAGNOSIS — O24.414 WHITE CLASSIFICATION A2 GESTATIONAL DIABETES MELLITUS (GDM), INSULIN CONTROLLED: ICD-10-CM

## 2024-05-01 PROCEDURE — 0503F POSTPARTUM CARE VISIT: CPT | Performed by: OBSTETRICS & GYNECOLOGY

## 2024-05-01 NOTE — PROGRESS NOTES
Marycruz Tamez is a 28 y.o. female returns for a routine post-partum follow-up visit     Chief Complaint   Patient presents with    Postpartum Care       Postpartum Depression: Low Risk  (3/20/2024)    [place]  Depression Scale     Last EPDS Total Score: 1     Last EPDS Self Harm Result: Never         Type of delivery: normal spontaneous vaginal delivery  Date of Delivery: 2024  Breastfeeding: yes  Bleeding Resolved: yes  Birth Control: none.  Last Pap: see report obtained 2 year(s) ago.        Problems: no problems    1. Have you been to the ER, urgent care clinic, or hospitalized since your last visit? No    2. Have you seen or consulted any other health care providers outside of the [facility] since your last visit? No    Examination chaperoned by Ariella Malone LPN.  
mouth

## 2024-05-02 LAB
GLUCOSE 1H P 75 G GLC PO SERPL-MCNC: NORMAL MG/DL
GLUCOSE 2H P 75 G GLC PO SERPL-MCNC: 92 MG/DL (ref 70–152)
GLUCOSE P FAST SERPL-MCNC: 79 MG/DL (ref 70–91)

## 2024-07-02 NOTE — PATIENT INSTRUCTIONS
Thank you for visiting [facility]!      For future medication refills, we require updated lab results and an upcoming appointment to be in our system prior to refilling prescriptions.  Without these two things, your refill will be DENIED.  If you miss your upcoming appointment, your refill will also be DENIED.      We appreciate your understanding of this critical clinical aspect of our practice. -Dr. Almeida & Cherelle, NP

## 2024-07-15 ENCOUNTER — OFFICE VISIT (OUTPATIENT)
Age: 29
End: 2024-07-15
Payer: COMMERCIAL

## 2024-07-15 VITALS
BODY MASS INDEX: 33.3 KG/M2 | OXYGEN SATURATION: 97 % | HEART RATE: 92 BPM | SYSTOLIC BLOOD PRESSURE: 104 MMHG | WEIGHT: 188 LBS | RESPIRATION RATE: 16 BRPM | DIASTOLIC BLOOD PRESSURE: 70 MMHG | TEMPERATURE: 98.4 F

## 2024-07-15 DIAGNOSIS — M45.6 ANKYLOSING SPONDYLITIS LUMBAR REGION (HCC): Primary | ICD-10-CM

## 2024-07-15 PROCEDURE — 99214 OFFICE O/P EST MOD 30 MIN: CPT | Performed by: PEDIATRICS

## 2024-07-15 RX ORDER — MEDROXYPROGESTERONE ACETATE 150 MG/ML
50 INJECTION, SUSPENSION INTRAMUSCULAR WEEKLY
Qty: 4 ML | Refills: 3 | Status: ACTIVE | OUTPATIENT
Start: 2024-07-15

## 2024-07-15 ASSESSMENT — PATIENT HEALTH QUESTIONNAIRE - PHQ9
1. LITTLE INTEREST OR PLEASURE IN DOING THINGS: NOT AT ALL
SUM OF ALL RESPONSES TO PHQ QUESTIONS 1-9: 0
SUM OF ALL RESPONSES TO PHQ QUESTIONS 1-9: 0
2. FEELING DOWN, DEPRESSED OR HOPELESS: NOT AT ALL
SUM OF ALL RESPONSES TO PHQ9 QUESTIONS 1 & 2: 0
SUM OF ALL RESPONSES TO PHQ QUESTIONS 1-9: 0
SUM OF ALL RESPONSES TO PHQ QUESTIONS 1-9: 0

## 2024-07-15 NOTE — PROGRESS NOTES
RHEUMATOLOGY PROBLEM LIST AND CHIEF COMPLAINT  1. Ankylosing Spondylitis - inflammatory back pain, spinous process edema T12-L1     Immunosuppression Screening (5/07/2021):  Quantiferon TB: negative  PPD:  Not performed  Hepatitis B: negative  Hepatitis C: negative    Therapy History includes:  Current NSAIDs include: ibuprofen 200 mg  Current DMARD therapy include:   Prior DMARD therapy include: Humira 40 mg every 14 days (7/11/2020 to 7/28/2020), Enbrel 50 mg every Saturday (8/01/2020 to 8/26/2020), Enbrel Mini 50 mg every Saturday (9/05/2020 to 12/2022), Plaquenil (7/2022-7/2023)  Discontinued DMARDs because of inefficacy: None  Discontinued DMARDs because of side effects: Humira (nausea, vomiting, headache, injection site reaction), Enbrel SureClick (injection site reaction)    INTERVAL HISTORY  This is a 28 y.o.  female.  Today, the patient complains of pain in the joints.  Location: back, hands  Severity: 3 on a scale of 0-10  Timing: all day     Duration: 4 months     Modifying factors:   Context/Associated signs and symptoms: At the patient's last visit we started approval for Enbrel which was approved, but the copay was high. She has since met her deductible since then. She is not currently on any medication and continues to have pain and stiffness in her SI joints and neck.     RHEUMATOLOGY REVIEW OF SYSTEMS   Positives as per history  Negatives as follows:  CONSTITUTlONAL:  Denies unexplained persistent fevers, weight change, chronic fatigue  HEAD/EYES:   Denies eye redness, blurry vision or sudden loss of vision, dry eyes, HA, temporal artery pain  ENT:    Denies oral/nasal ulcers, recurrent sinus infections, dry mouth  RESPIRATORY:  No pleuritic pain, history of pleural effusions, hemoptysis, exertional dyspnea  CARDIOVASCULAR: Denies chest pain, history of pericardial effusions  GASTRO:   Denies heartburn, esophageal dysmotility, abdominal pain, nausea, vomiting, diarrhea, blood in the

## 2024-07-19 ENCOUNTER — TELEPHONE (OUTPATIENT)
Age: 29
End: 2024-07-19

## 2024-07-22 ENCOUNTER — TELEPHONE (OUTPATIENT)
Age: 29
End: 2024-07-22

## 2024-07-22 NOTE — TELEPHONE ENCOUNTER
Dannielle at [facility] ([employer]) is calling to get verbal approval for damaged/defected medication Embrel Auto Touch. Call back number [phone]   REF# [id number]    Patient Last OV 7/15/24  Last Labs no updated labs

## 2024-08-07 ENCOUNTER — OFFICE VISIT (OUTPATIENT)
Age: 29
End: 2024-08-07
Payer: COMMERCIAL

## 2024-08-07 VITALS
HEART RATE: 95 BPM | SYSTOLIC BLOOD PRESSURE: 113 MMHG | DIASTOLIC BLOOD PRESSURE: 73 MMHG | BODY MASS INDEX: 33.13 KG/M2 | WEIGHT: 187 LBS

## 2024-08-07 DIAGNOSIS — Z12.4 CERVICAL CANCER SCREENING: ICD-10-CM

## 2024-08-07 DIAGNOSIS — Z01.419 ENCOUNTER FOR GYNECOLOGICAL EXAMINATION: Primary | ICD-10-CM

## 2024-08-07 PROCEDURE — 99459 PELVIC EXAMINATION: CPT | Performed by: OBSTETRICS & GYNECOLOGY

## 2024-08-07 PROCEDURE — 99395 PREV VISIT EST AGE 18-39: CPT | Performed by: OBSTETRICS & GYNECOLOGY

## 2024-08-07 NOTE — PROGRESS NOTES
[facility]  [phone]    Jeannette Cheung MD, FACOG        Annual Gynecologic Exam:  Chief Complaint   Patient presents with    Annual Exam       Marycruz Tamez is a ,  28 y.o. female   Patient's last menstrual period was 2024 (exact date).    The patient presents for her annual gynecologic checkup.     The patient is having problems - dysmenorrhea.  Saw PCP; neg for protein in urine.    Per Rooming Note:  Patient's last menstrual period was 2024 (exact date).  Her periods are moderate in flow and usually regular with a 26-32 day interval with 3-7 day duration.  She has dysmenorrhea.  Problems: no problems  Birth Control: none.  Last Pap: 11/10/2021 WNL  She does not have a history of BINDU 2, 3 or cervical cancer.    Sexual history and Contraception:    Social History     Substance and Sexual Activity   Sexual Activity Yes    Partners: Male    Birth control/protection: None      Past Medical History:   Diagnosis Date    Adverse effect of anesthesia     Patient stated she woke up \"In terror\" post op    Ankylosing spondylitis (HCC) 2020    Asthma     Autoimmune disorder (HCC)     Ankylosing spondylitis    Broken foot 2019    right foot, no surgery-wore boot/cast for 6 weeks    Chronic pain     generalized, ankle to back    Drug effect     Gestational diabetes     Pregnancy with due date 24    Migraine     Migraines     Muscle spasm     Pap smear for cervical cancer screening 11/10/2021    negative    Premature birth     at 28 weeks    Routine Papanicolaou smear 19 neg    Scoliosis     Tachycardia 2021    TMJ (temporomandibular joint syndrome)      Current Outpatient Medications   Medication Sig Dispense Refill    Fexofenadine HCl (ALLEGRA PO) Take by mouth      Prenatal Vit-Fe Fumarate-FA (PRENATAL PO) Take by mouth       No current facility-administered medications for this visit.      OB History    Para Term  AB Living   3 1 1 0 1 1   SAB

## 2024-08-07 NOTE — PROGRESS NOTES
Marycruz Tamez is a 28 y.o. female returns for an annual exam     Chief Complaint   Patient presents with    Annual Exam       Patient's last menstrual period was 07/23/2024 (exact date).  Her periods are moderate in flow and usually regular with a 26-32 day interval with 3-7 day duration.  She has dysmenorrhea.  Problems: no problems  Birth Control: none.  Last Pap: 11/10/2021 WNL  She does not have a history of BINDU 2, 3 or cervical cancer.         Examination chaperoned by Kamilah Malik MA.

## 2024-08-10 LAB
., LABCORP: NORMAL
CYTOLOGIST CVX/VAG CYTO: NORMAL
CYTOLOGY CVX/VAG DOC CYTO: NORMAL
CYTOLOGY CVX/VAG DOC THIN PREP: NORMAL
DX ICD CODE: NORMAL
Lab: NORMAL
OTHER STN SPEC: NORMAL
STAT OF ADQ CVX/VAG CYTO-IMP: NORMAL

## 2024-10-02 ENCOUNTER — PATIENT MESSAGE (OUTPATIENT)
Age: 29
End: 2024-10-02

## 2024-10-02 DIAGNOSIS — M45.6 ANKYLOSING SPONDYLITIS LUMBAR REGION (HCC): Primary | ICD-10-CM

## 2024-10-08 DIAGNOSIS — M45.6 ANKYLOSING SPONDYLITIS LUMBAR REGION (HCC): ICD-10-CM

## 2024-10-09 ENCOUNTER — TELEPHONE (OUTPATIENT)
Age: 29
End: 2024-10-09

## 2024-10-09 LAB
ALBUMIN SERPL-MCNC: 4 G/DL (ref 3.5–5)
ALBUMIN/GLOB SERPL: 1.1 (ref 1.1–2.2)
ALP SERPL-CCNC: 151 U/L (ref 45–117)
ALT SERPL-CCNC: 20 U/L (ref 12–78)
ANION GAP SERPL CALC-SCNC: 5 MMOL/L (ref 2–12)
AST SERPL-CCNC: 13 U/L (ref 15–37)
BASOPHILS # BLD: 0.1 K/UL (ref 0–0.1)
BASOPHILS NFR BLD: 1 % (ref 0–1)
BILIRUB SERPL-MCNC: 0.3 MG/DL (ref 0.2–1)
BUN SERPL-MCNC: 14 MG/DL (ref 6–20)
BUN/CREAT SERPL: 14 (ref 12–20)
CALCIUM SERPL-MCNC: 9.4 MG/DL (ref 8.5–10.1)
CHLORIDE SERPL-SCNC: 110 MMOL/L (ref 97–108)
CO2 SERPL-SCNC: 23 MMOL/L (ref 21–32)
CREAT SERPL-MCNC: 0.99 MG/DL (ref 0.55–1.02)
DIFFERENTIAL METHOD BLD: ABNORMAL
EOSINOPHIL # BLD: 0.3 K/UL (ref 0–0.4)
EOSINOPHIL NFR BLD: 2 % (ref 0–7)
ERYTHROCYTE [DISTWIDTH] IN BLOOD BY AUTOMATED COUNT: 12.3 % (ref 11.5–14.5)
GLOBULIN SER CALC-MCNC: 3.6 G/DL (ref 2–4)
GLUCOSE SERPL-MCNC: 105 MG/DL (ref 65–100)
HCT VFR BLD AUTO: 40.9 % (ref 35–47)
HGB BLD-MCNC: 13.6 G/DL (ref 11.5–16)
IMM GRANULOCYTES # BLD AUTO: 0 K/UL (ref 0–0.04)
IMM GRANULOCYTES NFR BLD AUTO: 0 % (ref 0–0.5)
LYMPHOCYTES # BLD: 4.3 K/UL (ref 0.8–3.5)
LYMPHOCYTES NFR BLD: 38 % (ref 12–49)
MCH RBC QN AUTO: 31 PG (ref 26–34)
MCHC RBC AUTO-ENTMCNC: 33.3 G/DL (ref 30–36.5)
MCV RBC AUTO: 93.2 FL (ref 80–99)
MONOCYTES # BLD: 0.7 K/UL (ref 0–1)
MONOCYTES NFR BLD: 6 % (ref 5–13)
NEUTS SEG # BLD: 6 K/UL (ref 1.8–8)
NEUTS SEG NFR BLD: 53 % (ref 32–75)
NRBC # BLD: 0 K/UL (ref 0–0.01)
NRBC BLD-RTO: 0 PER 100 WBC
PLATELET # BLD AUTO: 315 K/UL (ref 150–400)
PMV BLD AUTO: 10.8 FL (ref 8.9–12.9)
POTASSIUM SERPL-SCNC: 4 MMOL/L (ref 3.5–5.1)
PROT SERPL-MCNC: 7.6 G/DL (ref 6.4–8.2)
RBC # BLD AUTO: 4.39 M/UL (ref 3.8–5.2)
SODIUM SERPL-SCNC: 138 MMOL/L (ref 136–145)
WBC # BLD AUTO: 11.4 K/UL (ref 3.6–11)

## 2024-10-09 NOTE — TELEPHONE ENCOUNTER
Sandi at [facility] is calling to get refill on Embrel, patient had labs done on yesterday 10/8/24. Call back [phone]    Fax # [phone]

## 2024-10-10 NOTE — TELEPHONE ENCOUNTER
Last visit 07/15/24  Lab Results   Component Value Date     10/08/2024    K 4.0 10/08/2024     (H) 10/08/2024    CO2 23 10/08/2024    BUN 14 10/08/2024    CREATININE 0.99 10/08/2024    GLUCOSE 105 (H) 10/08/2024    CALCIUM 9.4 10/08/2024    BILITOT 0.3 10/08/2024    ALKPHOS 151 (H) 10/08/2024    AST 13 (L) 10/08/2024    ALT 20 10/08/2024    LABGLOM 80 10/08/2024    GFRAA >60 06/29/2022    AGRATIO 1.1 06/29/2022    GLOB 3.6 10/08/2024     Lab Results   Component Value Date    WBC 11.4 (H) 10/08/2024    HGB 13.6 10/08/2024    HCT 40.9 10/08/2024    MCV 93.2 10/08/2024     10/08/2024

## 2024-10-11 ENCOUNTER — TELEPHONE (OUTPATIENT)
Age: 29
End: 2024-10-11

## 2024-10-11 RX ORDER — ETANERCEPT 50 MG/ML
50 SOLUTION SUBCUTANEOUS WEEKLY
Qty: 4 EACH | Refills: 3 | Status: ACTIVE | OUTPATIENT
Start: 2024-10-11

## 2024-10-11 RX ORDER — ETANERCEPT 50 MG/ML
50 SOLUTION SUBCUTANEOUS WEEKLY
Qty: 4 EACH | Refills: 3 | Status: SHIPPED | OUTPATIENT
Start: 2024-10-11 | End: 2024-10-11 | Stop reason: SDUPTHER

## 2024-10-11 NOTE — TELEPHONE ENCOUNTER
Nolan sent in a script request for enbral.  Last visit:7/15/24  Next visit:12/3/24  Labs:10/8/24

## 2024-10-14 RX ORDER — ETANERCEPT 50 MG/ML
50 SOLUTION SUBCUTANEOUS WEEKLY
Qty: 4 EACH | Refills: 3 | Status: ACTIVE | OUTPATIENT
Start: 2024-10-14

## 2024-10-15 ENCOUNTER — TELEPHONE (OUTPATIENT)
Age: 29
End: 2024-10-15

## 2024-10-15 NOTE — TELEPHONE ENCOUNTER
Danika from [facility] stating that they received a prescription for Enbrel but the PT stated that she's been using a Enbrel mini and they'd like a prescription for this instead. Rep provided number.     [phone]

## 2024-10-24 NOTE — PROGRESS NOTES
Room B 3    Visit Vitals  /86 (BP 1 Location: Left upper arm, BP Patient Position: Sitting)   Pulse (!) 132   Resp 14   Ht 5' 3.5\" (1.613 m)   Wt 183 lb 9.6 oz (83.3 kg)   LMP 01/30/2022 (Exact Date)   SpO2 99%   BMI 32.01 kg/m²     Frequent urination started today, denies any other symptoms of UTI    Chest pain: off and on at least once a week    Shortness of breath: no    Edema: swelling in right ankle post op    Palpitations, Skipped beats, Rapid heartbeat: yes, feels like chest is going to explode, episode today  and the per Apple Watch HR dropped 46    Dizziness: slight    Fatigue:yes    New diagnosis/Surgeries: ligament reconstruction right ankle in December 2021    1. Have you been to the ER, urgent care clinic since your last visit? Hospitalized since your last visit? No      2. Have you seen or consulted any other health care providers outside of the [address] [place] [address] since your last visit? Include any pap smears or colon screening.  No     Refills:no With OP diarrhea x1 week  CT- Moderate gastric distention with dilatation of several small bowel loops with a transition to underdistended small bowel loops in the left upper/mid abdomen suspicious for SBO. Large amount of stool in the rectum. Mild perisacral inflammatory changes, raises suspicion for a component of fecal impaction.  Surgery contacted by ED, appreciate assistance  Given pulmonary pathologies anticipate conservative management  NPO for now  NGT if patient developed nausea/vomiting, otherwise avoid due to history epistaxis

## 2024-12-03 ENCOUNTER — OFFICE VISIT (OUTPATIENT)
Age: 29
End: 2024-12-03
Payer: COMMERCIAL

## 2024-12-03 VITALS
OXYGEN SATURATION: 97 % | TEMPERATURE: 97.6 F | RESPIRATION RATE: 16 BRPM | DIASTOLIC BLOOD PRESSURE: 82 MMHG | SYSTOLIC BLOOD PRESSURE: 120 MMHG | WEIGHT: 193 LBS | BODY MASS INDEX: 34.19 KG/M2 | HEART RATE: 85 BPM

## 2024-12-03 DIAGNOSIS — M45.6 ANKYLOSING SPONDYLITIS LUMBAR REGION (HCC): ICD-10-CM

## 2024-12-03 DIAGNOSIS — Z79.899 LONG TERM CURRENT USE OF IMMUNOSUPPRESSIVE DRUG: Primary | ICD-10-CM

## 2024-12-03 PROCEDURE — 99215 OFFICE O/P EST HI 40 MIN: CPT | Performed by: PEDIATRICS

## 2024-12-03 ASSESSMENT — PATIENT HEALTH QUESTIONNAIRE - PHQ9
SUM OF ALL RESPONSES TO PHQ QUESTIONS 1-9: 0
2. FEELING DOWN, DEPRESSED OR HOPELESS: NOT AT ALL
SUM OF ALL RESPONSES TO PHQ9 QUESTIONS 1 & 2: 0
1. LITTLE INTEREST OR PLEASURE IN DOING THINGS: NOT AT ALL

## 2024-12-03 NOTE — PATIENT INSTRUCTIONS
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   Although [facility] is in the process of hiring rheumatologists we do not have anything in place as of now.      We recommend the following offices for our adult rheumatology patients. It can take several months to secure an appointment so we suggest calling other offices now.     [facility]  [facility] - [phone] - Dr. Yanet Quiroz, Dr. Jay, Dr. Yousif  [facility] - [phone] - Dr. Randy Quiroz  [facility] - [phone] - Dr. Alonso Tinsley, Dr. Beckwith, Dr. Preciado  [facility]) - [phone] - Dr. Denis Mercedes  [facility] - [phone] - Dr. Romeo    [place] [facility]  [facility] - [phone] - Dr. Elizabeth Zhao, Dr. Rubinstein, Dr. Wilcox, Dr. Lina Sheth, Dr. Roverto Haddad (Med/Peds)    We recommend the following offices for our pediatric rheumatology patients.  Pediatric Rheumatology at [facility] [phone] - Dr. Maru Garrido or Dr. Roverto Haddad (Med/Peds)  Pediatric Rheumatology at [facility] [phone] - Dr. Aby Anderson  or Dr. Annamarie Zhu or Dr. Lety Lombardi   Pediatric Rheumatology at [facility] [phone] - Dr. Farrah Quezada or Dr. Alex Mae

## 2024-12-03 NOTE — PROGRESS NOTES
Chief Complaint   Patient presents with    Joint Pain       1. Have you been to the ER, urgent care clinic since your last visit?  Hospitalized since your last visit?No    2. Have you seen or consulted any other health care providers outside of the [facility] since your last visit?  Include any pap smears or colon screening. No    
[facility] - [phone] - Dr. Yanet Quiroz, Dr. Jay, Dr. Yousif  [facility] - [phone] - Dr. Ruggiero, Dr. Padilla  [facility] - [phone] - Dr. Alonso Tinsley, Dr. Beckwith, Dr. Preciado  [facility]) - [phone] - Dr. Denis Mercedes  [facility] - [phone] - Dr. Landaverdenancie    [place] [facility]  [facility] - [phone] - Dr. Elizabeth Zhao, Dr. Rubinstein, Dr. Wilcox, Dr. Lina Sheth, Dr. Roverto Haddad (Med/Peds)    We recommend the following offices for our pediatric rheumatology patients.  Pediatric Rheumatology at [facility] [phone] - Dr. Maru Garrido or Dr. Roverto Haddad (Med/Peds)  Pediatric Rheumatology at [facility] [phone] - Dr. Aby Anderson  or Dr. Annamarie Zhu or Dr. Lety Lombardi   Pediatric Rheumatology at [facility] [phone] - Dr. Farrah Quezada or Dr. Alex Mae       cc:  Alicia Banks DO     Total time was 40 minutes, greater than 50% of which was spent in counseling and coordination of care.  The diagnosis, treatment and various other items were discussed in detail: Test results, medication options, possible side effects, lifestyle changes.      I, Luis Almeida MD, personally performed the services described in the documentation as scribed by Alicia Key in my presence and have reviewed and agree with the note as scribed.

## 2024-12-04 ENCOUNTER — PATIENT MESSAGE (OUTPATIENT)
Age: 29
End: 2024-12-04

## 2024-12-04 DIAGNOSIS — M45.6 ANKYLOSING SPONDYLITIS LUMBAR REGION (HCC): ICD-10-CM

## 2024-12-04 DIAGNOSIS — Z79.899 LONG TERM CURRENT USE OF IMMUNOSUPPRESSIVE DRUG: Primary | ICD-10-CM

## 2024-12-04 LAB
ALBUMIN SERPL-MCNC: 4 G/DL (ref 3.5–5)
ALBUMIN/GLOB SERPL: 1.2 (ref 1.1–2.2)
ALP SERPL-CCNC: 141 U/L (ref 45–117)
ALT SERPL-CCNC: 23 U/L (ref 12–78)
ANION GAP SERPL CALC-SCNC: 6 MMOL/L (ref 2–12)
AST SERPL-CCNC: 16 U/L (ref 15–37)
BASOPHILS # BLD: 0.1 K/UL (ref 0–0.1)
BASOPHILS NFR BLD: 1 % (ref 0–1)
BILIRUB SERPL-MCNC: 0.2 MG/DL (ref 0.2–1)
BUN SERPL-MCNC: 18 MG/DL (ref 6–20)
BUN/CREAT SERPL: 19 (ref 12–20)
CALCIUM SERPL-MCNC: 9.7 MG/DL (ref 8.5–10.1)
CHLORIDE SERPL-SCNC: 113 MMOL/L (ref 97–108)
CO2 SERPL-SCNC: 24 MMOL/L (ref 21–32)
CREAT SERPL-MCNC: 0.97 MG/DL (ref 0.55–1.02)
DIFFERENTIAL METHOD BLD: ABNORMAL
EOSINOPHIL # BLD: 0.3 K/UL (ref 0–0.4)
EOSINOPHIL NFR BLD: 3 % (ref 0–7)
ERYTHROCYTE [DISTWIDTH] IN BLOOD BY AUTOMATED COUNT: 12.2 % (ref 11.5–14.5)
GLOBULIN SER CALC-MCNC: 3.4 G/DL (ref 2–4)
GLUCOSE SERPL-MCNC: 84 MG/DL (ref 65–100)
HCT VFR BLD AUTO: 40.8 % (ref 35–47)
HGB BLD-MCNC: 13.7 G/DL (ref 11.5–16)
IMM GRANULOCYTES # BLD AUTO: 0 K/UL (ref 0–0.04)
IMM GRANULOCYTES NFR BLD AUTO: 0 % (ref 0–0.5)
LYMPHOCYTES # BLD: 3.7 K/UL (ref 0.8–3.5)
LYMPHOCYTES NFR BLD: 33 % (ref 12–49)
MCH RBC QN AUTO: 31 PG (ref 26–34)
MCHC RBC AUTO-ENTMCNC: 33.6 G/DL (ref 30–36.5)
MCV RBC AUTO: 92.3 FL (ref 80–99)
MONOCYTES # BLD: 0.8 K/UL (ref 0–1)
MONOCYTES NFR BLD: 7 % (ref 5–13)
NEUTS SEG # BLD: 6.2 K/UL (ref 1.8–8)
NEUTS SEG NFR BLD: 56 % (ref 32–75)
NRBC # BLD: 0 K/UL (ref 0–0.01)
NRBC BLD-RTO: 0 PER 100 WBC
PLATELET # BLD AUTO: 342 K/UL (ref 150–400)
PMV BLD AUTO: 10.5 FL (ref 8.9–12.9)
POTASSIUM SERPL-SCNC: 4.2 MMOL/L (ref 3.5–5.1)
PROT SERPL-MCNC: 7.4 G/DL (ref 6.4–8.2)
RBC # BLD AUTO: 4.42 M/UL (ref 3.8–5.2)
SODIUM SERPL-SCNC: 143 MMOL/L (ref 136–145)
WBC # BLD AUTO: 11 K/UL (ref 3.6–11)

## 2024-12-07 LAB
GAMMA INTERFERON BACKGROUND BLD IA-ACNC: 0.11 IU/ML
M TB IFN-G BLD-IMP: NEGATIVE
M TB IFN-G CD4+ BCKGRND COR BLD-ACNC: 0.14 IU/ML
M TB IFN-G CD4+CD8+ BCKGRND COR BLD-ACNC: 0.11 IU/ML
MITOGEN IGNF BCKGRD COR BLD-ACNC: >10 IU/ML
SERVICE CMNT-IMP: NORMAL

## 2024-12-09 ENCOUNTER — TELEPHONE (OUTPATIENT)
Age: 29
End: 2024-12-09

## 2024-12-09 NOTE — TELEPHONE ENCOUNTER
Tiffany called from [facility] and she was asking for the frequency maintenance on the dose  for infliximab. A good call back number is [phone]

## 2024-12-18 ENCOUNTER — HOSPITAL ENCOUNTER (OUTPATIENT)
Facility: HOSPITAL | Age: 29
Discharge: HOME | End: 2024-12-21
Attending: PEDIATRICS
Payer: COMMERCIAL

## 2024-12-18 DIAGNOSIS — M45.6 ANKYLOSING SPONDYLITIS LUMBAR REGION (HCC): ICD-10-CM

## 2024-12-18 DIAGNOSIS — Z79.899 LONG TERM CURRENT USE OF IMMUNOSUPPRESSIVE DRUG: ICD-10-CM

## 2024-12-18 PROCEDURE — 72141 MRI NECK SPINE W/O DYE: CPT

## 2024-12-18 PROCEDURE — 72195 MRI PELVIS W/O DYE: CPT

## 2024-12-26 ENCOUNTER — TELEPHONE (OUTPATIENT)
Age: 29
End: 2024-12-26

## 2024-12-26 NOTE — TELEPHONE ENCOUNTER
PT called and stated that she's recently had a positive pregnancy and she'd like to discuss the treatment she's supposed to begin. PT stated that she's sent messages on [employer] to . Informed that he's not in office until 12/30 and she stated she understood. PT requesting call back from  or [facility] [place].

## 2025-01-02 DIAGNOSIS — Z32.01 POSITIVE PREGNANCY TEST: Primary | ICD-10-CM

## 2025-01-09 ENCOUNTER — OFFICE VISIT (OUTPATIENT)
Age: 30
End: 2025-01-09
Payer: COMMERCIAL

## 2025-01-09 VITALS
BODY MASS INDEX: 34.01 KG/M2 | HEART RATE: 101 BPM | WEIGHT: 192 LBS | DIASTOLIC BLOOD PRESSURE: 72 MMHG | SYSTOLIC BLOOD PRESSURE: 109 MMHG

## 2025-01-09 DIAGNOSIS — Z32.01 PREGNANCY, CONFIRMED, NOT FIRST: ICD-10-CM

## 2025-01-09 DIAGNOSIS — Z32.01 POSITIVE PREGNANCY TEST: Primary | ICD-10-CM

## 2025-01-09 PROCEDURE — 99213 OFFICE O/P EST LOW 20 MIN: CPT | Performed by: OBSTETRICS & GYNECOLOGY

## 2025-01-09 RX ORDER — PRENATAL WITH FERROUS FUM AND FOLIC ACID 3080; 920; 120; 400; 22; 1.84; 3; 20; 10; 1; 12; 200; 27; 25; 2 [IU]/1; [IU]/1; MG/1; [IU]/1; MG/1; MG/1; MG/1; MG/1; MG/1; MG/1; UG/1; MG/1; MG/1; MG/1; MG/1
1 TABLET ORAL DAILY
Qty: 90 TABLET | Refills: 3 | Status: SHIPPED | OUTPATIENT
Start: 2025-01-09 | End: 2025-04-09

## 2025-01-09 NOTE — PROGRESS NOTES
[facility]  [email or web address]  [email or web address]  [phone]    Jeannette Cheung MD, FACOG      OB/GYN Problem visit    HPI  Marycruz Tamez is a , 29 y.o. female who presents for a problem visit.   Chief Complaint   Patient presents with    Ultrasound     Viability Check d/t MRI during pregnancy       History of Present Illness  The patient presents for evaluation of pregnancy.    She is currently in her first trimester of pregnancy, specifically at 5 weeks and 5 days gestation. A follow-up ultrasound has been scheduled for 2024. She has not yet received her influenza vaccine and has expressed a desire to decline it. Her current medication regimen includes Allegra and prenatal vitamins.    She has a history of back disease and underwent an MRI without contrast, which showed stable results. She is not currently undergoing physical therapy for her back pain. She was going to start infusions but stopped everything when she became pregnant she plans to hold on theses.    No vaginal bleeding.        MEDICATIONS  Allegra, prenatal vitamins    IMMUNIZATIONS  She declined the influenza vaccine.      Per Rooming Note:    Patient's last menstrual period was 2024 (approximate).  Birth Control: none.     Last or next WWE is: 2024     The patient is reporting having: u/s today for viability check d/t MRI during pregnancy.   Pt LMP was 24. Had MRI on 2024. Positive UPT 24.   No bleeding since LMP. Cycles were coming regular.   EOB visit scheduled for 2025.   6w1d off LMP.      This is a new problem.  She has not experienced this problem before.     She reports the symptoms are is unchanged.  Aggravating factors include none.  And alleviating factors include none.     She does not have other concerns.  Sexual history and Contraception:  Social History     Substance and Sexual Activity   Sexual

## 2025-01-09 NOTE — PROGRESS NOTES
Rooming note, gyn problem visit:    Chief Complaint   Patient presents with    Ultrasound     Viability Check d/t MRI during pregnancy     Marycruz Tamez is a 29 y.o. female presents for a problem visit.    Patient's last menstrual period was 11/27/2024 (approximate).  Birth Control: none.    Last or next WWE is: 08/07/2024    The patient is reporting having: u/s today for viability check d/t MRI during pregnancy.   Pt LMP was 11/27/24. Had MRI on 12/18/2024. Positive UPT 12/23/24.   No bleeding since LMP. Cycles were coming regular.   EOB visit scheduled for 01/20/2025.   6w1d off LMP.     This is a new problem.  She has not experienced this problem before.    She reports the symptoms are is unchanged.  Aggravating factors include none.  And alleviating factors include none.    She does not have other concerns.

## 2025-01-10 ENCOUNTER — TELEPHONE (OUTPATIENT)
Age: 30
End: 2025-01-10

## 2025-01-10 NOTE — TELEPHONE ENCOUNTER
DIONISIO WITH CONCERNING HER CURRENT INSURANCE [facility] [employer] [facility] [employer]. WE ARE NOT CONTRACTED WITH. SHE STATED THAT SHE HAD A PLAN LAST YEAR THROUGH THE [employer] AND HER VISIT WERE COVERED. I LOOKED AT HER INSURANCE CARD FROM 2024 AND IT WAS NOT A [employer] COVERAGE. I ADVISDED HER THAT WE ARE CONTRACTED WITH [employer] THROUGH [employer].

## 2025-01-20 ENCOUNTER — ROUTINE PRENATAL (OUTPATIENT)
Age: 30
End: 2025-01-20

## 2025-01-20 VITALS
BODY MASS INDEX: 34.01 KG/M2 | HEART RATE: 116 BPM | WEIGHT: 192 LBS | DIASTOLIC BLOOD PRESSURE: 81 MMHG | SYSTOLIC BLOOD PRESSURE: 117 MMHG

## 2025-01-20 DIAGNOSIS — Z86.32 HISTORY OF GESTATIONAL DIABETES IN PRIOR PREGNANCY, CURRENTLY PREGNANT: ICD-10-CM

## 2025-01-20 DIAGNOSIS — O09.299 HISTORY OF GESTATIONAL DIABETES IN PRIOR PREGNANCY, CURRENTLY PREGNANT: ICD-10-CM

## 2025-01-20 DIAGNOSIS — Z34.91 PRENATAL CARE IN FIRST TRIMESTER: ICD-10-CM

## 2025-01-20 DIAGNOSIS — Z11.3 VENEREAL DISEASE SCREENING: Primary | ICD-10-CM

## 2025-01-20 DIAGNOSIS — R00.0 TACHYCARDIA: ICD-10-CM

## 2025-01-20 DIAGNOSIS — Z34.80 SUPERVISION OF OTHER NORMAL PREGNANCY, ANTEPARTUM: Primary | ICD-10-CM

## 2025-01-20 DIAGNOSIS — M45.9 ANKYLOSING SPONDYLITIS, UNSPECIFIED SITE OF SPINE (HCC): ICD-10-CM

## 2025-01-20 DIAGNOSIS — Z34.80 SUPERVISION OF OTHER NORMAL PREGNANCY, ANTEPARTUM: ICD-10-CM

## 2025-01-20 PROCEDURE — 0501F PRENATAL FLOW SHEET: CPT | Performed by: OBSTETRICS & GYNECOLOGY

## 2025-01-20 NOTE — PROGRESS NOTES
Rooming note, New OB visit.    C/o cramping on Wednesday       Current PCP: Alicia Banks DO Kaitlyn Dashawn is a 29 y.o. female presents for a new pregnancy visit and to establish routine OB care.      Chief Complaint   Patient presents with    Pregnancy Ultrasound    Initial Prenatal Visit       Last Pap smear: 2024 WNL    LMP history:  Patient's last menstrual period was 2024 (approximate).  The date of LMP is  certain.    The patient's last menstrual period was Normal.  The patient's menstrual cycles are regular.    A urine pregnancy test was positive 4 weeks ago.   The patient was not on contraception at time of conception.     Based on the patient's LMP, her EGA is 7 weeks 5 days giving an EDC of 9/3/2025.     Ultrasound data:  The patient had an ultrasound done by the ultrasound tech today which revealed a viable medina pregnancy with a gestational age of 7 weeks and 2 days giving an EDC of 2025.    Pregnancy symptoms:  The patient reports fatigue  breast tenderness  nausea  positive home pregnancy test  The patient denies bleeding since LMP.     The patient has not experienced  vomiting over the last few weeks.  The patient has not experienced abnormal bleeding since the patient found out she was pregnant.    The patient states she has there was no change in patient's weight.    Relevant past pregnancy history:   The patient has the following pregnancy history: hx of gestational diabetes. Hx of tachycardia. Hx of proteinuria. Hx of hypertension.     The patient does not have a history of  delivery (< 37 weeks).   The patient does not have a history of a  section delivery.   The patient does have a history of gestational diabetes, or pre-gestational diabetes.   The patient does have a history of gestational hypertension, chronic hypertension or preeclampsia.    The patient has had previous genetic carrier screening tests.  The patient has been previously tested 
sensitivity an specificity or tests.  We discussed that a formal genetic consultation could be obtained before or after performing screening or diagnostic testing.    Plan: NIPS, MFM FS due to personal history, early glucola NV    Disc US and JONATHAN: bleeding precautions reviewed, since smaller: plan observation.  The course of pregnancy discussed including visit schedule, ultrasounds, lab testing, etc.  Pt advised to avoid alcoholic beverages and illicit/recreational drugs use  Recommend taking prenatal vitamins or folic acid daily with DHA/fish oil.  The hospital and practice style discussed with coverage system.    We discussed nutrition, toxoplasmosis precautions, sexual activity, exercise, need for influenza vaccine, environmental and work hazards, travel advice, screen for domestic violence, need for seat belts.  We discussed seafood, unpasteurized dairy products, deli meat, artificial sweeteners, and caffeine intake.    We recommend avoiding chemical and toxin exposures when possible.   Information on prenatal and breastfeeding classes given.  Information on circumcision given in ACOG packet.  Patient encouraged not to smoke.  Discussed current prescription drug use. Given medication list.  Discussed the use of over the counter medications and chemicals.  She is advised to contact her MD with any questions.      Pt understands risk of hemorrhage during pregnancy and post delivery and would accept blood products if necessary in life-threatening emergencies   We discussed signs and symptoms of abnormal pregnancies and miscarriage.  Handouts given to pt.    We discussed potential risk of pregnancy and maternal complications if patient has a COVID-19 infection, flu or other viral infection during pregnancy and reviewed viral precautions and avoiding high risk transmission situations and discussed current CDC recommendations for vaccines in pregnancy.  We discussed that COVID 19 and flu vaccines and boosters are 
No

## 2025-01-21 LAB
ABO GROUP BLD: NORMAL
BLD GP AB SCN SERPL QL: NEGATIVE
ERYTHROCYTE [DISTWIDTH] IN BLOOD BY AUTOMATED COUNT: 12.5 % (ref 11.7–15.4)
HBV SURFACE AG SERPL QL IA: NEGATIVE
HCT VFR BLD AUTO: 40.6 % (ref 34–46.6)
HGB BLD-MCNC: 13.8 G/DL (ref 11.1–15.9)
HIV 1+2 AB+HIV1 P24 AG SERPL QL IA: NON REACTIVE
MCH RBC QN AUTO: 31.7 PG (ref 26.6–33)
MCHC RBC AUTO-ENTMCNC: 34 G/DL (ref 31.5–35.7)
MCV RBC AUTO: 93 FL (ref 79–97)
PLATELET # BLD AUTO: 348 X10E3/UL (ref 150–450)
RBC # BLD AUTO: 4.35 X10E6/UL (ref 3.77–5.28)
RH BLD: NEGATIVE
RPR SER QL: NON REACTIVE
RUBV IGG SERPL IA-ACNC: 2.08 INDEX
WBC # BLD AUTO: 11.1 X10E3/UL (ref 3.4–10.8)

## 2025-01-22 LAB — BACTERIA UR CULT: NORMAL

## 2025-01-24 LAB
C TRACH RRNA SPEC QL NAA+PROBE: NEGATIVE
N GONORRHOEA RRNA SPEC QL NAA+PROBE: NEGATIVE
T VAGINALIS RRNA SPEC QL NAA+PROBE: NEGATIVE

## 2025-02-04 ENCOUNTER — TELEPHONE (OUTPATIENT)
Age: 30
End: 2025-02-04

## 2025-02-04 NOTE — TELEPHONE ENCOUNTER
DIONISIO TO PATIENT CONCERNING BILLING FOR ULTRASOUND ON DOS 01/09/2025. THE INSURANCE CARRIER APPLIED $106.11 TO DEDUCTIBLE SO THEY ARE NOT GOING TO GIVE HER A DISCOUNT FOR HER INSURANCE BEING NON PAR. SHE STATED THAT SHE UNDERSTOOD AND STATED THAT SHE HAS CHANGED HER INSURANCE CARRIER TO [employer] THROUGH THE MARKETPLACE.

## 2025-02-14 SDOH — ECONOMIC STABILITY: FOOD INSECURITY: WITHIN THE PAST 12 MONTHS, YOU WORRIED THAT YOUR FOOD WOULD RUN OUT BEFORE YOU GOT MONEY TO BUY MORE.: NEVER TRUE

## 2025-02-14 SDOH — ECONOMIC STABILITY: FOOD INSECURITY: WITHIN THE PAST 12 MONTHS, THE FOOD YOU BOUGHT JUST DIDN'T LAST AND YOU DIDN'T HAVE MONEY TO GET MORE.: NEVER TRUE

## 2025-02-14 SDOH — ECONOMIC STABILITY: INCOME INSECURITY: IN THE LAST 12 MONTHS, WAS THERE A TIME WHEN YOU WERE NOT ABLE TO PAY THE MORTGAGE OR RENT ON TIME?: NO

## 2025-02-14 SDOH — ECONOMIC STABILITY: TRANSPORTATION INSECURITY
IN THE PAST 12 MONTHS, HAS THE LACK OF TRANSPORTATION KEPT YOU FROM MEDICAL APPOINTMENTS OR FROM GETTING MEDICATIONS?: NO

## 2025-02-17 ENCOUNTER — ROUTINE PRENATAL (OUTPATIENT)
Age: 30
End: 2025-02-17

## 2025-02-17 VITALS
WEIGHT: 190 LBS | SYSTOLIC BLOOD PRESSURE: 132 MMHG | HEART RATE: 89 BPM | BODY MASS INDEX: 33.66 KG/M2 | DIASTOLIC BLOOD PRESSURE: 83 MMHG

## 2025-02-17 DIAGNOSIS — Z34.81 PRENATAL CARE, SUBSEQUENT PREGNANCY IN FIRST TRIMESTER: ICD-10-CM

## 2025-02-17 DIAGNOSIS — Z34.80 SUPERVISION OF OTHER NORMAL PREGNANCY, ANTEPARTUM: Primary | ICD-10-CM

## 2025-02-17 PROCEDURE — 0502F SUBSEQUENT PRENATAL CARE: CPT | Performed by: OBSTETRICS & GYNECOLOGY

## 2025-02-18 LAB — GLUCOSE 1H P 50 G GLC PO SERPL-MCNC: 132 MG/DL (ref 70–139)

## 2025-02-22 LAB
Lab: NORMAL
NTRA 22Q11.2 DELETION SYNDROME POPULATION-BASED RISK TEXT: NORMAL
NTRA 22Q11.2 DELETION SYNDROME RESULT TEXT: NORMAL
NTRA 22Q11.2 DELETION SYNDROME RISK SCORE TEXT: NORMAL
NTRA FETAL FRACTION: NORMAL
NTRA FETAL RHD SUMMARY: NORMAL
NTRA GENDER OF FETUS: NORMAL
NTRA MONOSOMY X AGE-BASED RISK TEXT: NORMAL
NTRA MONOSOMY X RESULT TEXT: NORMAL
NTRA MONOSOMY X RISK SCORE TEXT: NORMAL
NTRA TRIPLOIDY RESULT TEXT: NORMAL
NTRA TRISOMY 13 AGE-BASED RISK TEXT: NORMAL
NTRA TRISOMY 13 RESULT TEXT: NORMAL
NTRA TRISOMY 13 RISK SCORE TEXT: NORMAL
NTRA TRISOMY 18 AGE-BASED RISK TEXT: NORMAL
NTRA TRISOMY 18 RESULT TEXT: NORMAL
NTRA TRISOMY 18 RISK SCORE TEXT: NORMAL
NTRA TRISOMY 21 AGE-BASED RISK TEXT: NORMAL
NTRA TRISOMY 21 RESULT TEXT: NORMAL
NTRA TRISOMY 21 RISK SCORE TEXT: NORMAL

## 2025-02-27 ENCOUNTER — ROUTINE PRENATAL (OUTPATIENT)
Age: 30
End: 2025-02-27

## 2025-02-27 VITALS — SYSTOLIC BLOOD PRESSURE: 111 MMHG | HEART RATE: 130 BPM | DIASTOLIC BLOOD PRESSURE: 72 MMHG

## 2025-02-27 DIAGNOSIS — Z3A.13 13 WEEKS GESTATION OF PREGNANCY: ICD-10-CM

## 2025-02-27 RX ORDER — ALBUTEROL SULFATE 0.83 MG/ML
SOLUTION RESPIRATORY (INHALATION)
COMMUNITY
Start: 2024-12-06

## 2025-02-27 RX ORDER — ALBUTEROL SULFATE 90 UG/1
INHALANT RESPIRATORY (INHALATION)
COMMUNITY
Start: 2024-12-06

## 2025-02-27 NOTE — PROGRESS NOTES
Marycruz Tamez is a 29 y.o. female    Chief Complaint   Patient presents with    New Patient    Pregnancy Ultrasound       /72   Pulse (!) 130   LMP 11/27/2024 (Approximate)   Breastfeeding Yes         1. Have you been to the ER, urgent care clinic since your last visit?  Hospitalized since your last visit? No    2. Have you seen or consulted any other health care providers outside of the [facility] since your last visit?  Include any pap smears or colon screening. No    Learning Assessment:      12/3/2024     2:00 PM 7/15/2024     2:30 PM 2/14/2024     9:00 AM 10/4/2023     2:00 PM 4/4/2023    12:00 AM 7/11/2022    12:00 AM   [facility] AMB LEARNING ASSESSMENT   Primary Learner Patient Patient Patient Patient Patient Patient   co-learner caregiver      No   Primary Language ENGLISH ENGLISH ENGLISH ENGLISH ENGLISH ENGLISH   Learning Preference DEMONSTRATION DEMONSTRATION DEMONSTRATION DEMONSTRATION DEMONSTRATION DEMONSTRATION   Answered By patient patient patient patient patient patient   Relationship to Learner SELF SELF SELF SELF SELF SELF       Fall Risk Assessment:       No data to display                Abuse Screening:       No data to display                ADL Screening:       No data to display

## 2025-02-27 NOTE — PROGRESS NOTES
Patient was seen 2/27/2025      Please look under media to view full consult and ultrasound report in ViewPoint.         Shahrzad Fuller MD   Maternal Fetal Medicine

## 2025-02-27 NOTE — PROCEDURES
PATIENT: ANÍBAL CRAWFORD   -  : 1995   -  DOS:2025   -  INTERPRETING PROVIDER:Shahrzad Fuller,   Indication  ========    H/o of gestational diabetes    Method  ======    Transabdominal ultrasound examination. View: suboptimal due to early gestational age    Pregnancy  =========    Curry pregnancy. Number of fetuses: 1    Dating  ======    LMP on: 2024  Cycle: regular cycle  GA by LMP 13 w + 1 d  JARRETT by LMP: 9/3/2025  Previous Ultrasound on: 1/3/2025  Type of prior assessment: GA  GA at prior assessment date 5 w + 5 d  GA by previous U/S 13 w + 4 d  JARRETT by previous Ultrasound: 2025  Ultrasound examination on: 2025  GA by U/S based upon: CRL  GA by U/S 13 w + 4 d  JARRETT by U/S: 2025  Assigned: based on the LMP, selected on 2025  Assigned GA 13 w + 1 d  Assigned JARRETT: 9/3/2025    General Evaluation  ==============    Cardiac activity present  Placenta: Anterior  Cord vessels: 3 vessel cord  Amniotic fluid: normal amount    Fetal Biometry  ============    Standard   bpm  87% Nicolaides  CRL 74.9 mm 13w 4d 75% Hadlock  NT 1.90 mm  Extended  Nasal bone: present  MVP 3.5 cm    Fetal Anatomy  ===========    The following structures appear normal:  Stomach. Bladder.    The following structures were visualized:  Cranium: Midline falx  Choroid plexus. Face: Profile. Abdominal wall: Intact. Arms. Legs.    Fetal sex: from NIPT    Maternal Structures  ===============    Uterus / Cervix  Cervix: Visualized  Ovaries / Tubes / Adnexa  Rt ovary: Visualized  Rt ovary D1 3.2 cm  Rt ovary D2 1.9 cm  Rt ovary D3 1.6 cm  Rt ovary Vol 5.1 cm³  Lt ovary: Visualized  Lt ovary D1 2.8 cm  Lt ovary D2 1.9 cm  Lt ovary D3 1.7 cm  Lt ovary Vol 4.7 cm³    Findings  =======    Intrauterine Curry pregnancy at 13w 1d by clinical dates.  Cardiac activity is present.  Due to early gestation, limited anatomy visualized is stated above.  Right ovary is Visualized.  Left ovary is Visualized.    The

## 2025-03-17 ENCOUNTER — ROUTINE PRENATAL (OUTPATIENT)
Age: 30
End: 2025-03-17

## 2025-03-17 VITALS
BODY MASS INDEX: 34.55 KG/M2 | HEIGHT: 63 IN | WEIGHT: 195 LBS | RESPIRATION RATE: 18 BRPM | HEART RATE: 109 BPM | SYSTOLIC BLOOD PRESSURE: 119 MMHG | DIASTOLIC BLOOD PRESSURE: 80 MMHG

## 2025-03-17 DIAGNOSIS — Z34.82 PRENATAL CARE, SUBSEQUENT PREGNANCY IN SECOND TRIMESTER: Primary | ICD-10-CM

## 2025-03-17 PROCEDURE — 0502F SUBSEQUENT PRENATAL CARE: CPT | Performed by: OBSTETRICS & GYNECOLOGY

## 2025-03-17 ASSESSMENT — PATIENT HEALTH QUESTIONNAIRE - PHQ9
SUM OF ALL RESPONSES TO PHQ QUESTIONS 1-9: 0
2. FEELING DOWN, DEPRESSED OR HOPELESS: NOT AT ALL
1. LITTLE INTEREST OR PLEASURE IN DOING THINGS: NOT AT ALL

## 2025-03-19 ENCOUNTER — RESULTS FOLLOW-UP (OUTPATIENT)
Age: 30
End: 2025-03-19

## 2025-03-19 LAB
AFP INTERP SERPL-IMP: NORMAL
AFP INTERP SERPL-IMP: NORMAL
AFP MOM SERPL: 0.65
AFP SERPL-MCNC: 16.1 NG/ML
AGE AT DELIVERY: 28 YR
COMMENT: NORMAL
GA METHOD: NORMAL
GA: 15 WEEKS
IDDM PATIENT QL: NO
Lab: NORMAL
MULTIPLE PREGNANCY: NO
NEURAL TUBE DEFECT RISK FETUS: NORMAL %

## 2025-04-11 DIAGNOSIS — Z34.90 PREGNANCY, UNSPECIFIED GESTATIONAL AGE: Primary | ICD-10-CM

## 2025-04-14 ENCOUNTER — ROUTINE PRENATAL (OUTPATIENT)
Age: 30
End: 2025-04-14
Payer: COMMERCIAL

## 2025-04-14 ENCOUNTER — ROUTINE PRENATAL (OUTPATIENT)
Age: 30
End: 2025-04-14

## 2025-04-14 VITALS — SYSTOLIC BLOOD PRESSURE: 110 MMHG | DIASTOLIC BLOOD PRESSURE: 74 MMHG | HEART RATE: 93 BPM

## 2025-04-14 VITALS
HEART RATE: 120 BPM | RESPIRATION RATE: 16 BRPM | DIASTOLIC BLOOD PRESSURE: 82 MMHG | WEIGHT: 196 LBS | BODY MASS INDEX: 34.72 KG/M2 | SYSTOLIC BLOOD PRESSURE: 127 MMHG

## 2025-04-14 DIAGNOSIS — Z34.82 PRENATAL CARE, SUBSEQUENT PREGNANCY IN SECOND TRIMESTER: Primary | ICD-10-CM

## 2025-04-14 DIAGNOSIS — Z34.90 PREGNANCY, UNSPECIFIED GESTATIONAL AGE: ICD-10-CM

## 2025-04-14 PROCEDURE — 0502F SUBSEQUENT PRENATAL CARE: CPT | Performed by: OBSTETRICS & GYNECOLOGY

## 2025-04-14 PROCEDURE — 76811 OB US DETAILED SNGL FETUS: CPT | Performed by: OBSTETRICS & GYNECOLOGY

## 2025-04-14 PROCEDURE — 99213 OFFICE O/P EST LOW 20 MIN: CPT | Performed by: OBSTETRICS & GYNECOLOGY

## 2025-04-14 NOTE — PROCEDURES
PATIENT: ANÍBAL CRAWFORD   -  : 1995   -  DOS:2025   -  INTERPRETING PROVIDER:Kedar Collins,   Indication  ========    H/o of gestational diabetes    Method  ======    Transabdominal and transvaginal ultrasound examination. View: suboptimal due to unfavorable fetal position    Dating  ======    LMP on: 2024  Cycle: regular cycle  GA by LMP 19 w + 5 d  JARRETT by LMP: 9/3/2025  Previous Ultrasound on: 1/3/2025  Type of prior assessment: GA  GA at prior assessment date 5 w + 5 d  GA by previous U/S 20 w + 1 d  JARRETT by previous Ultrasound: 2025  Ultrasound examination on: 2025  GA by U/S based upon: AC, BPD, Femur, HC  GA by U/S 19 w + 3 d  JARRETT by U/S: 2025  Assigned: based on the LMP, selected on 2025  Assigned GA 19 w + 5 d  Assigned JARRETT: 9/3/2025    Fetal Growth Overview  =================    Exam date        GA              BPD (mm)         HC (mm)              AC (mm)              FL (mm)             HL (mm)             EFW (g)  2025        19w 5d        44     33%         167.6    29%        144.5     47%        29.5    22%        30.6     68%        293    30%    Fetal Biometry  ============    Standard  BPD 44.0 mm 19w 2d 33% Hadlock  OFD 60.8 mm 21w 0d 88% Jonna  .6 mm 19w 3d 29% Hadlock  Cerebellum tr 19.0 mm 18w 4d 23% Hill  Nuchal fold 4.0 mm  .5 mm 19w 5d 47% Hadlock  Femur 29.5 mm 19w 1d 22% Hadlock  Humerus 30.6 mm 20w 1d 68% Jonna   g 19w 2d 30% Hadlock  EFW (lb) 0 lb  EFW (oz) 10 oz  EFW by: Hadlock (BPD-HC-AC-FL)  Extended   5.3 mm  CM 6.0 mm  82% Nicolaides  Nasal bone 5.8 mm  Head / Face / Neck  Nasal bone: present  Other Structures   bpm    General Evaluation  ==============    Cardiac activity present.  bpm. Fetal movements: visualized. Presentation: BREECH  Placenta: Placental site: Anterior. Placental edge-to-cervical os distance 8.1 cm  Umbilical cord: Cord vessels: 3 vessel cord, 3 vessel cord. Insertion site:

## 2025-04-14 NOTE — PROGRESS NOTES
Patient was seen 4/14/2025      Please look under media to view full consult and ultrasound report in ViewPoint.       Kedar Collins MD  Maternal Fetal Medicine

## 2025-05-12 ENCOUNTER — ROUTINE PRENATAL (OUTPATIENT)
Age: 30
End: 2025-05-12

## 2025-05-12 VITALS
BODY MASS INDEX: 35.96 KG/M2 | SYSTOLIC BLOOD PRESSURE: 127 MMHG | DIASTOLIC BLOOD PRESSURE: 85 MMHG | HEART RATE: 130 BPM | WEIGHT: 203 LBS | RESPIRATION RATE: 18 BRPM

## 2025-05-12 DIAGNOSIS — Z34.83 PRENATAL CARE, SUBSEQUENT PREGNANCY IN THIRD TRIMESTER: Primary | ICD-10-CM

## 2025-05-12 PROCEDURE — 0502F SUBSEQUENT PRENATAL CARE: CPT | Performed by: OBSTETRICS & GYNECOLOGY

## 2025-05-12 NOTE — PROGRESS NOTES
Pt doing well, see prenatal flowsheet.  Glucola at next visit  F/U with PNC scheduled for 32 weeks.

## 2025-06-06 ENCOUNTER — ROUTINE PRENATAL (OUTPATIENT)
Age: 30
End: 2025-06-06
Payer: COMMERCIAL

## 2025-06-06 VITALS
DIASTOLIC BLOOD PRESSURE: 72 MMHG | BODY MASS INDEX: 36.81 KG/M2 | WEIGHT: 207.8 LBS | HEART RATE: 107 BPM | SYSTOLIC BLOOD PRESSURE: 108 MMHG

## 2025-06-06 DIAGNOSIS — Z34.90 PREGNANCY, UNSPECIFIED GESTATIONAL AGE: Primary | ICD-10-CM

## 2025-06-06 DIAGNOSIS — Z34.83 PRENATAL CARE, SUBSEQUENT PREGNANCY IN THIRD TRIMESTER: ICD-10-CM

## 2025-06-06 DIAGNOSIS — Z67.91 RH NEGATIVE STATE IN ANTEPARTUM PERIOD: ICD-10-CM

## 2025-06-06 DIAGNOSIS — O26.899 RH NEGATIVE STATE IN ANTEPARTUM PERIOD: ICD-10-CM

## 2025-06-06 LAB
BLOOD BANK CMNT PATIENT-IMP: NORMAL
BLOOD GROUP ANTIBODIES SERPL: NORMAL

## 2025-06-06 PROCEDURE — 0502F SUBSEQUENT PRENATAL CARE: CPT | Performed by: OBSTETRICS & GYNECOLOGY

## 2025-06-06 PROCEDURE — 96372 THER/PROPH/DIAG INJ SC/IM: CPT | Performed by: OBSTETRICS & GYNECOLOGY

## 2025-06-06 NOTE — PATIENT INSTRUCTIONS
Patient Education        Learning About Rh Immunoglobulin Shots  Introduction    An Rh immunoglobulin shot is given during pregnancy to prevent Rh sensitization. Rh sensitization can happen if you have Rh-negative blood and your baby has Rh-positive blood. If the two types of blood mix, your body will make antibodies against your baby's blood. Most of the time, this is not a problem the first time you're pregnant. But it could cause problems in future pregnancies.  This shot keeps your body from making the antibodies. If you need the shot, it is given around 28 weeks of pregnancy. After the birth, your baby's blood is tested. If the blood is Rh positive, you will receive another shot. The shot may also be given if you have vaginal bleeding while you are pregnant or if you have a miscarriage. These shots protect future pregnancies.  Example  Rh immunoglobulin (HypRho-D, MICRhoGAM, and RhoGAM)  Possible side effects  Rare side effects may include:  Some mild pain where you got the shot.  A slight fever.  An allergic reaction.  You may have other side effects not listed here. Check the information that comes with your medicine.  What to know about taking this medicine  You may need more than one shot. You may need the shot again:  After amniocentesis, fetal blood sampling, or chorionic villus sampling tests.  If you have bleeding in your second or third trimester.  After turning of a breech baby.  After an injury to the belly while you are pregnant.  After a miscarriage or an .  Before or right after treatment for an ectopic or a partial molar pregnancy.  Tell your doctor if you have any allergies or have had a bad response to medicines in the past.  If you get this shot within 3 months of getting a live-virus vaccine, the vaccine may not work. Your doctor will tell you if you need more vaccine.  Check with your doctor or pharmacist before you use any other medicines. This includes over-the-counter medicines.

## 2025-06-06 NOTE — PROGRESS NOTES
After obtaining consent, and per orders of Dr Joann Curtis, injection of Rhogam given in Ventrogluteal Left  by KRISTINE LOPEZ LPN. Patient instructed to remain in clinic for 20 minutes afterwards, and to report any adverse reaction to me immediately. Lot: [id number]  Exp: 08/03/2027  NDC: 5102-8471-93.

## 2025-06-06 NOTE — PROGRESS NOTES
Pt doing well, see prenatal flowsheet.  Jamia, Rhogam, consents reviewed today  Tdap at next visit.

## 2025-06-09 LAB
ERYTHROCYTE [DISTWIDTH] IN BLOOD BY AUTOMATED COUNT: 13.4 % (ref 11.5–14.5)
GLUCOSE 1H P 100 G GLC PO SERPL-MCNC: 166 MG/DL (ref 65–140)
HCT VFR BLD AUTO: 39.7 % (ref 35–47)
HGB BLD-MCNC: 13.1 G/DL (ref 11.5–16)
MCH RBC QN AUTO: 32 PG (ref 26–34)
MCHC RBC AUTO-ENTMCNC: 33 G/DL (ref 30–36.5)
MCV RBC AUTO: 97.1 FL (ref 80–99)
MEV IGG SER IA-ACNC: >300 AU/ML
MUV IGG SER IA-ACNC: <9 AU/ML
NRBC # BLD: 0 K/UL (ref 0–0.01)
NRBC BLD-RTO: 0 PER 100 WBC
PLATELET # BLD AUTO: 299 K/UL (ref 150–400)
PMV BLD AUTO: 10.7 FL (ref 8.9–12.9)
RBC # BLD AUTO: 4.09 M/UL (ref 3.8–5.2)
RPR SER QL: NONREACTIVE
RUBV IGG SERPL IA-ACNC: 1.46 INDEX
WBC # BLD AUTO: 11.6 K/UL (ref 3.6–11)

## 2025-06-11 ENCOUNTER — RESULTS FOLLOW-UP (OUTPATIENT)
Age: 30
End: 2025-06-11

## 2025-06-11 ENCOUNTER — NURSE ONLY (OUTPATIENT)
Age: 30
End: 2025-06-11

## 2025-06-11 ENCOUNTER — TELEPHONE (OUTPATIENT)
Age: 30
End: 2025-06-11

## 2025-06-11 RX ORDER — AVOBENZONE, HOMOSALATE, OCTISALATE, OCTOCRYLENE 30; 40; 45; 26 MG/ML; MG/ML; MG/ML; MG/ML
1 CREAM TOPICAL 4 TIMES DAILY
Qty: 200 EACH | Refills: 5 | Status: SHIPPED | OUTPATIENT
Start: 2025-06-11

## 2025-06-11 RX ORDER — GLUCOSAMINE HCL/CHONDROITIN SU 500-400 MG
CAPSULE ORAL
Qty: 100 STRIP | Refills: 3 | Status: SHIPPED | OUTPATIENT
Start: 2025-06-11

## 2025-06-11 RX ORDER — UBIQUINOL 100 MG
CAPSULE ORAL
Qty: 100 EACH | Refills: 3 | Status: SHIPPED | OUTPATIENT
Start: 2025-06-11

## 2025-06-13 ENCOUNTER — TELEPHONE (OUTPATIENT)
Age: 30
End: 2025-06-13

## 2025-06-13 NOTE — TELEPHONE ENCOUNTER
Spoke with patient via telephone in regards to questions patient had about difference in glucometer readings. Advised patient to wipe the first two drops of blood and use third drop to check blood sugar, and to take glucometer to a pharmacy to see if it needs to be calibrated

## 2025-06-23 ENCOUNTER — ROUTINE PRENATAL (OUTPATIENT)
Age: 30
End: 2025-06-23

## 2025-06-23 VITALS
WEIGHT: 207.8 LBS | HEART RATE: 112 BPM | BODY MASS INDEX: 36.81 KG/M2 | DIASTOLIC BLOOD PRESSURE: 72 MMHG | SYSTOLIC BLOOD PRESSURE: 119 MMHG

## 2025-06-23 DIAGNOSIS — Z34.90 PREGNANCY, UNSPECIFIED GESTATIONAL AGE: Primary | ICD-10-CM

## 2025-06-23 DIAGNOSIS — Z34.83 PRENATAL CARE, SUBSEQUENT PREGNANCY IN THIRD TRIMESTER: ICD-10-CM

## 2025-06-23 PROCEDURE — 0502F SUBSEQUENT PRENATAL CARE: CPT | Performed by: OBSTETRICS & GYNECOLOGY

## 2025-06-23 NOTE — PROGRESS NOTES
Pt doing well, see prenatal flowsheet.  BS all normal at home.  Has had some cramping will send urine for culture.

## 2025-06-24 LAB
BACTERIA SPEC CULT: NORMAL
CC UR VC: NORMAL
SERVICE CMNT-IMP: NORMAL

## 2025-06-26 ENCOUNTER — RESULTS FOLLOW-UP (OUTPATIENT)
Age: 30
End: 2025-06-26

## 2025-07-09 ENCOUNTER — ROUTINE PRENATAL (OUTPATIENT)
Age: 30
End: 2025-07-09

## 2025-07-09 VITALS
BODY MASS INDEX: 36.31 KG/M2 | HEART RATE: 86 BPM | RESPIRATION RATE: 16 BRPM | WEIGHT: 205 LBS | SYSTOLIC BLOOD PRESSURE: 122 MMHG | DIASTOLIC BLOOD PRESSURE: 85 MMHG

## 2025-07-09 VITALS — HEART RATE: 103 BPM | SYSTOLIC BLOOD PRESSURE: 107 MMHG | DIASTOLIC BLOOD PRESSURE: 74 MMHG

## 2025-07-09 DIAGNOSIS — O09.293 HISTORY OF GESTATIONAL DIABETES MELLITUS (GDM) IN PRIOR PREGNANCY, CURRENTLY PREGNANT IN THIRD TRIMESTER: ICD-10-CM

## 2025-07-09 DIAGNOSIS — O24.419 GESTATIONAL DIABETES MELLITUS (GDM), ANTEPARTUM, GESTATIONAL DIABETES METHOD OF CONTROL UNSPECIFIED: ICD-10-CM

## 2025-07-09 DIAGNOSIS — O24.410 DIET CONTROLLED GESTATIONAL DIABETES MELLITUS (GDM) IN THIRD TRIMESTER: Primary | ICD-10-CM

## 2025-07-09 DIAGNOSIS — Z34.90 PREGNANCY, UNSPECIFIED GESTATIONAL AGE: ICD-10-CM

## 2025-07-09 DIAGNOSIS — Z34.83 PRENATAL CARE, SUBSEQUENT PREGNANCY IN THIRD TRIMESTER: Primary | ICD-10-CM

## 2025-07-09 DIAGNOSIS — Z86.32 HISTORY OF GESTATIONAL DIABETES MELLITUS (GDM) IN PRIOR PREGNANCY, CURRENTLY PREGNANT IN THIRD TRIMESTER: ICD-10-CM

## 2025-07-09 PROCEDURE — 0502F SUBSEQUENT PRENATAL CARE: CPT | Performed by: OBSTETRICS & GYNECOLOGY

## 2025-07-09 RX ORDER — AVOBENZONE, HOMOSALATE, OCTISALATE, OCTOCRYLENE 30; 40; 45; 26 MG/ML; MG/ML; MG/ML; MG/ML
1 CREAM TOPICAL 4 TIMES DAILY
Qty: 200 EACH | Refills: 3 | Status: SHIPPED | OUTPATIENT
Start: 2025-07-09

## 2025-07-09 RX ORDER — BLOOD-GLUCOSE METER
1 KIT MISCELLANEOUS DAILY
Qty: 1 KIT | Refills: 0 | Status: SHIPPED | OUTPATIENT
Start: 2025-07-09

## 2025-07-09 RX ORDER — BLOOD SUGAR DIAGNOSTIC
1 STRIP MISCELLANEOUS 4 TIMES DAILY
Qty: 150 EACH | Refills: 3 | Status: SHIPPED | OUTPATIENT
Start: 2025-07-09

## 2025-07-09 RX ORDER — UBIQUINOL 100 MG
CAPSULE ORAL
Qty: 100 EACH | Refills: 3 | Status: SHIPPED | OUTPATIENT
Start: 2025-07-09

## 2025-07-09 RX ORDER — ACYCLOVIR 400 MG/1
1 TABLET ORAL
Qty: 2 EACH | Refills: 5 | Status: SHIPPED | OUTPATIENT
Start: 2025-07-09 | End: 2025-07-09

## 2025-07-09 RX ORDER — ACYCLOVIR 400 MG/1
1 TABLET ORAL DAILY
Qty: 1 EACH | Refills: 0 | Status: SHIPPED | OUTPATIENT
Start: 2025-07-09

## 2025-07-09 RX ORDER — ACYCLOVIR 400 MG/1
1 TABLET ORAL
Qty: 3 EACH | Refills: 3 | Status: SHIPPED | OUTPATIENT
Start: 2025-07-09

## 2025-07-09 RX ORDER — ACYCLOVIR 400 MG/1
1 TABLET ORAL
Qty: 1 EACH | Refills: 0 | Status: SHIPPED | OUTPATIENT
Start: 2025-07-09 | End: 2025-07-09

## 2025-07-09 NOTE — PROGRESS NOTES
Assessment & Plan   ASSESSMENT/PLAN:  1. Diet controlled gestational diabetes mellitus (GDM) in third trimester  2. History of gestational diabetes mellitus (GDM) in prior pregnancy, currently pregnant in third trimester    ANÍBAL is 29 yrs of age,  at 32w 0d.      AGA growth, normal HEIDI today.     Presumed Gestational Diabetes/ Hx GDM A2 in prior pregnancy:   We reviewed patient's new diagnosis of gestational diabetes. Discussion with the patient included an overview of gestational diabetes, methods of management, monitoring and surveillance,  implications (i.e., macrosomia as well as increased risk of the patient developing diabetes in the future). We discussed the importance of good glycemia control and risks of uncontrolled diabetes including but not limited to LGA, polyhydramnios,  labor, and fetal death. Declined Packet with DM education material, reports she has materials from previous pregnancy.   - normal early 1hr (132). 25: 1 hr gtt 166 fail. Checking blood sugars in lieu of 3 hr testing.   - Reviewed need for 75 gram glucola at 6 weeks postpartum and lifelong risk for Type II DM: 50% risk at 5 years - Declined referral to diabetes ed. Reports well versed on plan from GDM A2 in prior pregnancy.   - Reviewed importance of BG log and submitting each week to monitor BG (Patient to submit via [employer]hart or in person visit)   - Recommend serial growth q4 weeks from diagnosis with MFM   - Weekly ANT starting at 36 weeks   - Delivery between 39.0-40.6      - Log reviewed:  fasting elevated. Post prandial within parameters. Pt will modify her bedtime snack over the next few days, if fasting remains elevated we discussed starting NPH at bedtime. Pt aware upon starting insulin rec will change to weekly ANT.      Maternal Obesity (Pre-Pregnancy BMI 33 ):  -Normal anatomic survey today (palate and maxilla not cleared)   - Recommend growth scan at 32 weeks  - ANT testing not indicated

## 2025-07-09 NOTE — PROGRESS NOTES
Patient was seen 7/9/2025      Please look under media to view full consult and ultrasound report in ViewPoint.         Kedar Collins MD  Maternal Fetal Medicine

## 2025-07-09 NOTE — PROCEDURES
PATIENT: ANÍBAL CRAWFORD   -  : 1995   -  DOS:2025   -  INTERPRETING PROVIDER:Kedar Collins,   Indication  ========    H/o of gestational diabetes , BMI 33    Method  ======    Transabdominal ultrasound examination. View: Sufficient    Pregnancy  =========    Curry pregnancy. Number of fetuses: 1    Dating  ======    LMP on: 2024  Cycle: regular cycle  GA by LMP 32 w + 0 d  JARRETT by LMP: 9/3/2025  Previous Ultrasound on: 1/3/2025  Type of prior assessment: GA  GA at prior assessment date 5 w + 5 d  GA by previous U/S 32 w + 3 d  JARRETT by previous Ultrasound: 2025  Ultrasound examination on: 2025  GA by U/S based upon: AC, BPD, Femur, HC  GA by U/S 33 w + 0 d  JARRETT by U/S: 2025  Assigned: based on the LMP, selected on 2025  Assigned GA 32 w + 0 d  Assigned JARRETT: 9/3/2025    Fetal Biometry  ============    Standard  BPD 82.4 mm 33w 1d 75% Hadlock  .4 mm 35w 3d 98% Jonna  .8 mm 33w 4d 54% Hadlock  .2 mm 32w 0d 51% Hadlock  Femur 64.0 mm 33w 0d 66% Hadlock  EFW 2,009 g 32w 2d 59% Hadlock  EFW (lb) 4 lb  EFW (oz) 7 oz  EFW by: Hadlock (BPD-HC-AC-FL)  Other Structures   bpm    General Evaluation  ==============    Cardiac activity present.  bpm. Fetal movements: visualized. Presentation: Cephalic  Placenta: Placental site: Anterior  Umbilical cord: Cord vessels: 3 vessel cord, 3 vessel cord. Insertion site: central  Amniotic fluid: Amount of AF: normal. MVP 5.0 cm. HEIDI 12.2 cm. Q1 5.0 cm, Q2 2.3 cm, Q3 3.3 cm, Q4 1.6 cm    Fetal Anatomy  ===========    Face  Palate: normal  Maxilla: normal  Stomach: normal  Kidneys: normal  Bladder: normal  Wants to know fetal sex: yes    Findings  =======    Intrauterine Curry pregnancy at 32w 0d by clinical dates.  EFW is 2009 g at 59%, abdominal circumference at 51%.  Amniotic fluid: normal.  Placenta is Anterior.  Cephalic presentation.    Previously, the fetal anatomy survey was incomplete. Today, the

## 2025-07-23 ENCOUNTER — ROUTINE PRENATAL (OUTPATIENT)
Age: 30
End: 2025-07-23

## 2025-07-23 VITALS
BODY MASS INDEX: 36.31 KG/M2 | DIASTOLIC BLOOD PRESSURE: 76 MMHG | HEART RATE: 100 BPM | WEIGHT: 205 LBS | SYSTOLIC BLOOD PRESSURE: 119 MMHG | RESPIRATION RATE: 18 BRPM

## 2025-07-23 DIAGNOSIS — Z34.83 PRENATAL CARE, SUBSEQUENT PREGNANCY IN THIRD TRIMESTER: Primary | ICD-10-CM

## 2025-07-23 PROCEDURE — 0502F SUBSEQUENT PRENATAL CARE: CPT | Performed by: OBSTETRICS & GYNECOLOGY

## 2025-08-06 ENCOUNTER — ROUTINE PRENATAL (OUTPATIENT)
Age: 30
End: 2025-08-06

## 2025-08-06 VITALS
DIASTOLIC BLOOD PRESSURE: 79 MMHG | OXYGEN SATURATION: 97 % | RESPIRATION RATE: 16 BRPM | HEIGHT: 64 IN | SYSTOLIC BLOOD PRESSURE: 122 MMHG | BODY MASS INDEX: 35.51 KG/M2 | HEART RATE: 102 BPM | WEIGHT: 208 LBS

## 2025-08-06 VITALS — DIASTOLIC BLOOD PRESSURE: 76 MMHG | SYSTOLIC BLOOD PRESSURE: 115 MMHG | HEART RATE: 95 BPM

## 2025-08-06 DIAGNOSIS — Z34.83 PRENATAL CARE, SUBSEQUENT PREGNANCY IN THIRD TRIMESTER: Primary | ICD-10-CM

## 2025-08-06 DIAGNOSIS — Z34.90 PREGNANCY, UNSPECIFIED GESTATIONAL AGE: ICD-10-CM

## 2025-08-06 PROCEDURE — 0502F SUBSEQUENT PRENATAL CARE: CPT | Performed by: OBSTETRICS & GYNECOLOGY

## 2025-08-06 RX ORDER — INSULIN HUMAN 100 [IU]/ML
10 INJECTION, SUSPENSION SUBCUTANEOUS NIGHTLY
Qty: 2 ADJUSTABLE DOSE PRE-FILLED PEN SYRINGE | Refills: 1 | Status: SHIPPED | OUTPATIENT
Start: 2025-08-06

## 2025-08-07 ENCOUNTER — NURSE ONLY (OUTPATIENT)
Age: 30
End: 2025-08-07

## 2025-08-07 DIAGNOSIS — Z34.90 PREGNANCY, UNSPECIFIED GESTATIONAL AGE: Primary | ICD-10-CM

## 2025-08-09 LAB
GP B STREP DNA SPEC QL NAA+PROBE: POSITIVE
SPECIMEN SOURCE: ABNORMAL

## 2025-08-12 ENCOUNTER — ROUTINE PRENATAL (OUTPATIENT)
Age: 30
End: 2025-08-12

## 2025-08-12 VITALS — HEART RATE: 96 BPM | DIASTOLIC BLOOD PRESSURE: 71 MMHG | SYSTOLIC BLOOD PRESSURE: 104 MMHG

## 2025-08-12 DIAGNOSIS — O24.414 INSULIN CONTROLLED GESTATIONAL DIABETES MELLITUS (GDM) IN THIRD TRIMESTER: Primary | ICD-10-CM

## 2025-08-12 DIAGNOSIS — Z3A.36 36 WEEKS GESTATION OF PREGNANCY: ICD-10-CM

## 2025-08-13 ENCOUNTER — ROUTINE PRENATAL (OUTPATIENT)
Age: 30
End: 2025-08-13

## 2025-08-13 ENCOUNTER — NURSE ONLY (OUTPATIENT)
Age: 30
End: 2025-08-13

## 2025-08-13 VITALS
WEIGHT: 206.6 LBS | BODY MASS INDEX: 35.27 KG/M2 | DIASTOLIC BLOOD PRESSURE: 72 MMHG | HEART RATE: 109 BPM | OXYGEN SATURATION: 99 % | HEIGHT: 64 IN | RESPIRATION RATE: 16 BRPM | SYSTOLIC BLOOD PRESSURE: 122 MMHG

## 2025-08-13 DIAGNOSIS — Z34.83 PRENATAL CARE, SUBSEQUENT PREGNANCY IN THIRD TRIMESTER: Primary | ICD-10-CM

## 2025-08-13 PROCEDURE — 0502F SUBSEQUENT PRENATAL CARE: CPT | Performed by: OBSTETRICS & GYNECOLOGY

## 2025-08-19 ENCOUNTER — ROUTINE PRENATAL (OUTPATIENT)
Age: 30
End: 2025-08-19
Payer: COMMERCIAL

## 2025-08-19 ENCOUNTER — ROUTINE PRENATAL (OUTPATIENT)
Age: 30
End: 2025-08-19

## 2025-08-19 VITALS — DIASTOLIC BLOOD PRESSURE: 74 MMHG | SYSTOLIC BLOOD PRESSURE: 106 MMHG | HEART RATE: 94 BPM

## 2025-08-19 VITALS
SYSTOLIC BLOOD PRESSURE: 128 MMHG | DIASTOLIC BLOOD PRESSURE: 62 MMHG | RESPIRATION RATE: 16 BRPM | WEIGHT: 209.8 LBS | HEIGHT: 64 IN | BODY MASS INDEX: 35.82 KG/M2 | OXYGEN SATURATION: 99 % | HEART RATE: 97 BPM

## 2025-08-19 DIAGNOSIS — R35.0 URINARY FREQUENCY: Primary | ICD-10-CM

## 2025-08-19 DIAGNOSIS — O24.414 INSULIN CONTROLLED GESTATIONAL DIABETES MELLITUS (GDM) IN THIRD TRIMESTER: Primary | ICD-10-CM

## 2025-08-19 DIAGNOSIS — Z34.83 PRENATAL CARE, SUBSEQUENT PREGNANCY IN THIRD TRIMESTER: ICD-10-CM

## 2025-08-19 LAB
CLINDAMYCIN ISLT KB: ABNORMAL
ORGANISM: ABNORMAL
SOURCE: ABNORMAL

## 2025-08-19 PROCEDURE — 99999 PR OFFICE/OUTPT VISIT,PROCEDURE ONLY: CPT | Performed by: STUDENT IN AN ORGANIZED HEALTH CARE EDUCATION/TRAINING PROGRAM

## 2025-08-19 PROCEDURE — 0502F SUBSEQUENT PRENATAL CARE: CPT | Performed by: OBSTETRICS & GYNECOLOGY

## 2025-08-19 PROCEDURE — 76818 FETAL BIOPHYS PROFILE W/NST: CPT | Performed by: STUDENT IN AN ORGANIZED HEALTH CARE EDUCATION/TRAINING PROGRAM

## 2025-08-19 PROCEDURE — 99213 OFFICE O/P EST LOW 20 MIN: CPT

## 2025-08-20 ENCOUNTER — HOSPITAL ENCOUNTER (INPATIENT)
Facility: HOSPITAL | Age: 30
LOS: 2 days | Discharge: HOME | End: 2025-08-22
Attending: OBSTETRICS & GYNECOLOGY | Admitting: OBSTETRICS & GYNECOLOGY
Payer: COMMERCIAL

## 2025-08-20 ENCOUNTER — TELEPHONE (OUTPATIENT)
Age: 30
End: 2025-08-20

## 2025-08-20 PROBLEM — O42.00 RUPTURE OF AMNIOTIC SAC LESS THAN 24 HOURS PRIOR TO THE ONSET OF LABOR: Status: ACTIVE | Noted: 2025-08-20

## 2025-08-20 LAB
AMNISURE, POC: POSITIVE
BASOPHILS # BLD: 0.04 K/UL (ref 0–0.1)
BASOPHILS NFR BLD: 0.4 % (ref 0–1)
DIFFERENTIAL METHOD BLD: ABNORMAL
EOSINOPHIL # BLD: 0.05 K/UL (ref 0–0.4)
EOSINOPHIL NFR BLD: 0.4 % (ref 0–7)
ERYTHROCYTE [DISTWIDTH] IN BLOOD BY AUTOMATED COUNT: 13.5 % (ref 11.5–14.5)
GLUCOSE BLD STRIP.AUTO-MCNC: 113 MG/DL (ref 65–117)
GLUCOSE BLD STRIP.AUTO-MCNC: 82 MG/DL (ref 65–117)
HCT VFR BLD AUTO: 38.1 % (ref 35–47)
HGB BLD-MCNC: 13.3 G/DL (ref 11.5–16)
IMM GRANULOCYTES # BLD AUTO: 0.07 K/UL (ref 0–0.04)
IMM GRANULOCYTES NFR BLD AUTO: 0.6 % (ref 0–0.5)
LYMPHOCYTES # BLD: 1.71 K/UL (ref 0.8–3.5)
LYMPHOCYTES NFR BLD: 15.1 % (ref 12–49)
Lab: NORMAL
MCH RBC QN AUTO: 32.2 PG (ref 26–34)
MCHC RBC AUTO-ENTMCNC: 34.9 G/DL (ref 30–36.5)
MCV RBC AUTO: 92.3 FL (ref 80–99)
MONOCYTES # BLD: 0.53 K/UL (ref 0–1)
MONOCYTES NFR BLD: 4.7 % (ref 5–13)
NEGATIVE QC PASS/FAIL: NORMAL
NEUTS SEG # BLD: 8.89 K/UL (ref 1.8–8)
NEUTS SEG NFR BLD: 78.8 % (ref 32–75)
NRBC # BLD: 0 K/UL (ref 0–0.01)
NRBC BLD-RTO: 0 PER 100 WBC
PLATELET # BLD AUTO: 241 K/UL (ref 150–400)
PMV BLD AUTO: 10.8 FL (ref 8.9–12.9)
POSITIVE QC PASS/FAIL: NORMAL
RBC # BLD AUTO: 4.13 M/UL (ref 3.8–5.2)
SERVICE CMNT-IMP: NORMAL
SERVICE CMNT-IMP: NORMAL
WBC # BLD AUTO: 11.3 K/UL (ref 3.6–11)

## 2025-08-20 PROCEDURE — 2500000003 HC RX 250 WO HCPCS: Performed by: OBSTETRICS & GYNECOLOGY

## 2025-08-20 PROCEDURE — 2580000003 HC RX 258: Performed by: ADVANCED PRACTICE MIDWIFE

## 2025-08-20 PROCEDURE — 6360000002 HC RX W HCPCS: Performed by: ADVANCED PRACTICE MIDWIFE

## 2025-08-20 PROCEDURE — 7210000100 HC LABOR FEE PER 1 HR: Performed by: ADVANCED PRACTICE MIDWIFE

## 2025-08-20 PROCEDURE — 86900 BLOOD TYPING SEROLOGIC ABO: CPT

## 2025-08-20 PROCEDURE — 86922 COMPATIBILITY TEST ANTIGLOB: CPT

## 2025-08-20 PROCEDURE — 86901 BLOOD TYPING SEROLOGIC RH(D): CPT

## 2025-08-20 PROCEDURE — 82962 GLUCOSE BLOOD TEST: CPT

## 2025-08-20 PROCEDURE — 1100000000 HC RM PRIVATE

## 2025-08-20 PROCEDURE — G0379 DIRECT REFER HOSPITAL OBSERV: HCPCS

## 2025-08-20 PROCEDURE — 86920 COMPATIBILITY TEST SPIN: CPT

## 2025-08-20 PROCEDURE — 86921 COMPATIBILITY TEST INCUBATE: CPT

## 2025-08-20 PROCEDURE — 86870 RBC ANTIBODY IDENTIFICATION: CPT

## 2025-08-20 PROCEDURE — G0378 HOSPITAL OBSERVATION PER HR: HCPCS

## 2025-08-20 PROCEDURE — 85025 COMPLETE CBC W/AUTO DIFF WBC: CPT

## 2025-08-20 PROCEDURE — 86850 RBC ANTIBODY SCREEN: CPT

## 2025-08-20 PROCEDURE — 2580000003 HC RX 258: Performed by: OBSTETRICS & GYNECOLOGY

## 2025-08-20 PROCEDURE — 36415 COLL VENOUS BLD VENIPUNCTURE: CPT

## 2025-08-20 PROCEDURE — 6360000002 HC RX W HCPCS: Performed by: OBSTETRICS & GYNECOLOGY

## 2025-08-20 RX ORDER — SODIUM CHLORIDE 0.9 % (FLUSH) 0.9 %
5-40 SYRINGE (ML) INJECTION PRN
Status: DISCONTINUED | OUTPATIENT
Start: 2025-08-20 | End: 2025-08-21

## 2025-08-20 RX ORDER — OXYTOCIN 10 [USP'U]/ML
INJECTION, SOLUTION INTRAMUSCULAR; INTRAVENOUS
Status: COMPLETED
Start: 2025-08-20 | End: 2025-08-20

## 2025-08-20 RX ORDER — ONDANSETRON 2 MG/ML
4 INJECTION INTRAMUSCULAR; INTRAVENOUS EVERY 6 HOURS PRN
OUTPATIENT
Start: 2025-08-20

## 2025-08-20 RX ORDER — ACETAMINOPHEN 325 MG/1
650 TABLET ORAL EVERY 4 HOURS PRN
Status: DISCONTINUED | OUTPATIENT
Start: 2025-08-20 | End: 2025-08-21

## 2025-08-20 RX ORDER — TERBUTALINE SULFATE 1 MG/ML
0.25 INJECTION SUBCUTANEOUS
Status: DISCONTINUED | OUTPATIENT
Start: 2025-08-20 | End: 2025-08-21

## 2025-08-20 RX ORDER — SODIUM CHLORIDE, SODIUM LACTATE, POTASSIUM CHLORIDE, AND CALCIUM CHLORIDE .6; .31; .03; .02 G/100ML; G/100ML; G/100ML; G/100ML
500 INJECTION, SOLUTION INTRAVENOUS PRN
Status: DISCONTINUED | OUTPATIENT
Start: 2025-08-20 | End: 2025-08-21

## 2025-08-20 RX ORDER — SODIUM CHLORIDE, SODIUM LACTATE, POTASSIUM CHLORIDE, AND CALCIUM CHLORIDE .6; .31; .03; .02 G/100ML; G/100ML; G/100ML; G/100ML
1000 INJECTION, SOLUTION INTRAVENOUS PRN
Status: DISCONTINUED | OUTPATIENT
Start: 2025-08-20 | End: 2025-08-21

## 2025-08-20 RX ORDER — SODIUM CHLORIDE 9 MG/ML
INJECTION, SOLUTION INTRAVENOUS PRN
Status: DISCONTINUED | OUTPATIENT
Start: 2025-08-20 | End: 2025-08-21

## 2025-08-20 RX ORDER — ONDANSETRON 4 MG/1
4 TABLET, ORALLY DISINTEGRATING ORAL EVERY 6 HOURS PRN
OUTPATIENT
Start: 2025-08-20

## 2025-08-20 RX ORDER — SODIUM CHLORIDE 0.9 % (FLUSH) 0.9 %
5-40 SYRINGE (ML) INJECTION EVERY 12 HOURS SCHEDULED
Status: DISCONTINUED | OUTPATIENT
Start: 2025-08-20 | End: 2025-08-21

## 2025-08-20 RX ORDER — METHYLERGONOVINE MALEATE 0.2 MG/ML
200 INJECTION INTRAVENOUS PRN
Status: DISCONTINUED | OUTPATIENT
Start: 2025-08-20 | End: 2025-08-21

## 2025-08-20 RX ORDER — DOCUSATE SODIUM 100 MG/1
100 CAPSULE, LIQUID FILLED ORAL 2 TIMES DAILY
Status: DISCONTINUED | OUTPATIENT
Start: 2025-08-20 | End: 2025-08-21

## 2025-08-20 RX ORDER — CARBOPROST TROMETHAMINE 250 UG/ML
250 INJECTION, SOLUTION INTRAMUSCULAR PRN
Status: DISCONTINUED | OUTPATIENT
Start: 2025-08-20 | End: 2025-08-21

## 2025-08-20 RX ORDER — MISOPROSTOL 200 UG/1
400 TABLET ORAL PRN
OUTPATIENT
Start: 2025-08-20

## 2025-08-20 RX ORDER — TRANEXAMIC ACID 10 MG/ML
1000 INJECTION, SOLUTION INTRAVENOUS
Status: DISCONTINUED | OUTPATIENT
Start: 2025-08-20 | End: 2025-08-21

## 2025-08-20 RX ORDER — SODIUM CHLORIDE, SODIUM LACTATE, POTASSIUM CHLORIDE, CALCIUM CHLORIDE 600; 310; 30; 20 MG/100ML; MG/100ML; MG/100ML; MG/100ML
INJECTION, SOLUTION INTRAVENOUS CONTINUOUS
Status: DISCONTINUED | OUTPATIENT
Start: 2025-08-20 | End: 2025-08-21

## 2025-08-20 RX ORDER — LOPERAMIDE HYDROCHLORIDE 2 MG/1
2 CAPSULE ORAL PRN
Status: DISCONTINUED | OUTPATIENT
Start: 2025-08-20 | End: 2025-08-21

## 2025-08-20 RX ADMIN — WATER 2000 MG: 1 INJECTION INTRAMUSCULAR; INTRAVENOUS; SUBCUTANEOUS at 11:01

## 2025-08-20 RX ADMIN — WATER 1000 MG: 1 INJECTION INTRAMUSCULAR; INTRAVENOUS; SUBCUTANEOUS at 18:40

## 2025-08-20 RX ADMIN — OXYTOCIN 2 MILLI-UNITS/MIN: 10 INJECTION INTRAVENOUS at 21:58

## 2025-08-20 RX ADMIN — SODIUM CHLORIDE, SODIUM LACTATE, POTASSIUM CHLORIDE, AND CALCIUM CHLORIDE: .6; .31; .03; .02 INJECTION, SOLUTION INTRAVENOUS at 21:57

## 2025-08-21 LAB
BACTERIA SPEC CULT: NORMAL
CC UR VC: NORMAL
SERVICE CMNT-IMP: NORMAL

## 2025-08-21 PROCEDURE — 6370000000 HC RX 637 (ALT 250 FOR IP): Performed by: ADVANCED PRACTICE MIDWIFE

## 2025-08-21 PROCEDURE — 7210000100 HC LABOR FEE PER 1 HR: Performed by: ADVANCED PRACTICE MIDWIFE

## 2025-08-21 PROCEDURE — 59400 OBSTETRICAL CARE: CPT | Performed by: OBSTETRICS & GYNECOLOGY

## 2025-08-21 PROCEDURE — 2500000003 HC RX 250 WO HCPCS: Performed by: OBSTETRICS & GYNECOLOGY

## 2025-08-21 PROCEDURE — 7220000101 HC DELIVERY VAGINAL/SINGLE: Performed by: ADVANCED PRACTICE MIDWIFE

## 2025-08-21 PROCEDURE — 1120000000 HC RM PRIVATE OB

## 2025-08-21 PROCEDURE — 6360000002 HC RX W HCPCS: Performed by: OBSTETRICS & GYNECOLOGY

## 2025-08-21 PROCEDURE — 4A1HXCZ MONITORING OF PRODUCTS OF CONCEPTION, CARDIAC RATE, EXTERNAL APPROACH: ICD-10-PCS | Performed by: OBSTETRICS & GYNECOLOGY

## 2025-08-21 PROCEDURE — 0HQ9XZZ REPAIR PERINEUM SKIN, EXTERNAL APPROACH: ICD-10-PCS | Performed by: OBSTETRICS & GYNECOLOGY

## 2025-08-21 PROCEDURE — 6360000002 HC RX W HCPCS

## 2025-08-21 PROCEDURE — 94761 N-INVAS EAR/PLS OXIMETRY MLT: CPT

## 2025-08-21 RX ORDER — SODIUM CHLORIDE 9 MG/ML
INJECTION, SOLUTION INTRAVENOUS PRN
Status: DISCONTINUED | OUTPATIENT
Start: 2025-08-21 | End: 2025-08-22 | Stop reason: HOSPADM

## 2025-08-21 RX ORDER — LIDOCAINE HYDROCHLORIDE 10 MG/ML
10 INJECTION, SOLUTION EPIDURAL; INFILTRATION; INTRACAUDAL; PERINEURAL ONCE
Status: COMPLETED | OUTPATIENT
Start: 2025-08-21 | End: 2025-08-21

## 2025-08-21 RX ORDER — SODIUM CHLORIDE 0.9 % (FLUSH) 0.9 %
5-40 SYRINGE (ML) INJECTION PRN
Status: DISCONTINUED | OUTPATIENT
Start: 2025-08-21 | End: 2025-08-22 | Stop reason: HOSPADM

## 2025-08-21 RX ORDER — IBUPROFEN 800 MG/1
800 TABLET, FILM COATED ORAL EVERY 8 HOURS PRN
Status: DISCONTINUED | OUTPATIENT
Start: 2025-08-21 | End: 2025-08-22 | Stop reason: HOSPADM

## 2025-08-21 RX ORDER — KETOROLAC TROMETHAMINE 30 MG/ML
30 INJECTION, SOLUTION INTRAMUSCULAR; INTRAVENOUS EVERY 6 HOURS PRN
Status: DISCONTINUED | OUTPATIENT
Start: 2025-08-21 | End: 2025-08-22 | Stop reason: HOSPADM

## 2025-08-21 RX ORDER — ONDANSETRON 2 MG/ML
4 INJECTION INTRAMUSCULAR; INTRAVENOUS EVERY 6 HOURS PRN
Status: DISCONTINUED | OUTPATIENT
Start: 2025-08-21 | End: 2025-08-22 | Stop reason: HOSPADM

## 2025-08-21 RX ORDER — LOPERAMIDE HYDROCHLORIDE 2 MG/1
2 CAPSULE ORAL PRN
Status: DISCONTINUED | OUTPATIENT
Start: 2025-08-21 | End: 2025-08-22 | Stop reason: HOSPADM

## 2025-08-21 RX ORDER — SODIUM CHLORIDE 0.9 % (FLUSH) 0.9 %
5-40 SYRINGE (ML) INJECTION EVERY 12 HOURS SCHEDULED
Status: DISCONTINUED | OUTPATIENT
Start: 2025-08-21 | End: 2025-08-22 | Stop reason: HOSPADM

## 2025-08-21 RX ORDER — DOCUSATE SODIUM 100 MG/1
100 CAPSULE, LIQUID FILLED ORAL 2 TIMES DAILY
Status: DISCONTINUED | OUTPATIENT
Start: 2025-08-21 | End: 2025-08-22 | Stop reason: HOSPADM

## 2025-08-21 RX ORDER — ONDANSETRON 4 MG/1
4 TABLET, ORALLY DISINTEGRATING ORAL EVERY 6 HOURS PRN
Status: DISCONTINUED | OUTPATIENT
Start: 2025-08-21 | End: 2025-08-22 | Stop reason: HOSPADM

## 2025-08-21 RX ORDER — ACETAMINOPHEN 500 MG
1000 TABLET ORAL EVERY 8 HOURS
Status: DISCONTINUED | OUTPATIENT
Start: 2025-08-21 | End: 2025-08-22 | Stop reason: HOSPADM

## 2025-08-21 RX ADMIN — IBUPROFEN 800 MG: 800 TABLET ORAL at 09:35

## 2025-08-21 RX ADMIN — ACETAMINOPHEN 1000 MG: 500 TABLET ORAL at 10:53

## 2025-08-21 RX ADMIN — DOCUSATE SODIUM 100 MG: 100 CAPSULE, LIQUID FILLED ORAL at 20:46

## 2025-08-21 RX ADMIN — LIDOCAINE HYDROCHLORIDE 10 ML: 10 INJECTION, SOLUTION EPIDURAL; INFILTRATION; INTRACAUDAL; PERINEURAL at 07:25

## 2025-08-21 RX ADMIN — DOCUSATE SODIUM 100 MG: 100 CAPSULE, LIQUID FILLED ORAL at 10:53

## 2025-08-21 RX ADMIN — IBUPROFEN 800 MG: 800 TABLET ORAL at 20:45

## 2025-08-21 RX ADMIN — WATER 1000 MG: 1 INJECTION INTRAMUSCULAR; INTRAVENOUS; SUBCUTANEOUS at 03:02

## 2025-08-21 ASSESSMENT — PAIN - FUNCTIONAL ASSESSMENT
PAIN_FUNCTIONAL_ASSESSMENT: 0-10
PAIN_FUNCTIONAL_ASSESSMENT: ACTIVITIES ARE NOT PREVENTED
PAIN_FUNCTIONAL_ASSESSMENT: 0-10
PAIN_FUNCTIONAL_ASSESSMENT: 0-10
PAIN_FUNCTIONAL_ASSESSMENT: ACTIVITIES ARE NOT PREVENTED
PAIN_FUNCTIONAL_ASSESSMENT: ACTIVITIES ARE NOT PREVENTED

## 2025-08-21 ASSESSMENT — PAIN DESCRIPTION - LOCATION
LOCATION: ABDOMEN
LOCATION: ABDOMEN
LOCATION: PERINEUM

## 2025-08-21 ASSESSMENT — PAIN DESCRIPTION - DESCRIPTORS
DESCRIPTORS: ACHING
DESCRIPTORS: CRAMPING
DESCRIPTORS: ACHING

## 2025-08-21 ASSESSMENT — PAIN DESCRIPTION - ORIENTATION
ORIENTATION: LOWER
ORIENTATION: LOWER;MID

## 2025-08-21 ASSESSMENT — PAIN SCALES - GENERAL
PAINLEVEL_OUTOF10: 4
PAINLEVEL_OUTOF10: 2
PAINLEVEL_OUTOF10: 3

## 2025-08-22 VITALS
DIASTOLIC BLOOD PRESSURE: 64 MMHG | HEART RATE: 92 BPM | RESPIRATION RATE: 16 BRPM | SYSTOLIC BLOOD PRESSURE: 106 MMHG | OXYGEN SATURATION: 96 % | TEMPERATURE: 97.7 F

## 2025-08-22 PROCEDURE — 94761 N-INVAS EAR/PLS OXIMETRY MLT: CPT

## 2025-08-22 PROCEDURE — 6370000000 HC RX 637 (ALT 250 FOR IP): Performed by: ADVANCED PRACTICE MIDWIFE

## 2025-08-22 RX ORDER — IBUPROFEN 800 MG/1
800 TABLET, FILM COATED ORAL EVERY 8 HOURS PRN
Qty: 60 TABLET | Refills: 0 | Status: SHIPPED | OUTPATIENT
Start: 2025-08-22

## 2025-08-22 RX ADMIN — DOCUSATE SODIUM 100 MG: 100 CAPSULE, LIQUID FILLED ORAL at 08:03

## (undated) DEVICE — DRILL: Brand: SONICFUSION

## (undated) DEVICE — GLOVE ORTHO 8   MSG9480

## (undated) DEVICE — GLOVE SURG SZ 8 L12IN FNGR THK79MIL GRN LTX FREE

## (undated) DEVICE — GLOVE ORTHO 7 1/2   MSG9475

## (undated) DEVICE — BLADE SAW W9XL25MM THK051MM CUT THK074MM REPL S STL SAG

## (undated) DEVICE — NEEDLE SUT SZ 4 MAYO CATGUT 1/2 CIR TAPR PNT DISP

## (undated) DEVICE — Device

## (undated) DEVICE — TOWEL,OR,DSP,ST,BLUE,STD,4/PK,20PK/CS: Brand: MEDLINE

## (undated) DEVICE — SPONGE LAP 18X18IN STRL -- 5/PK

## (undated) DEVICE — BANDAGE COMPR W6INXL10YD ST M E WHITE/BEIGE

## (undated) DEVICE — BRUSH SCRB 4% CHG RED DISP --

## (undated) DEVICE — DRESSING,GAUZE,XEROFORM,CURAD,1"X8",ST: Brand: CURAD

## (undated) DEVICE — SUT ETHLN 3-0 18IN PS2 BLK --

## (undated) DEVICE — SOL IRRIGATION INJ NACL 0.9% 500ML BTL

## (undated) DEVICE — GOWN,SIRUS,NONRNF,SETINSLV,XL,20/CS: Brand: MEDLINE

## (undated) DEVICE — EXTREMITY-SFMCASU: Brand: MEDLINE INDUSTRIES, INC.

## (undated) DEVICE — PAD,ABDOMINAL,5"X9",ST,LF,25/BX: Brand: MEDLINE INDUSTRIES, INC.